# Patient Record
Sex: FEMALE | Race: WHITE | Employment: OTHER
[De-identification: names, ages, dates, MRNs, and addresses within clinical notes are randomized per-mention and may not be internally consistent; named-entity substitution may affect disease eponyms.]

---

## 2017-03-06 ENCOUNTER — OFFICE VISIT (OUTPATIENT)
Dept: FAMILY MEDICINE CLINIC | Facility: CLINIC | Age: 72
End: 2017-03-06

## 2017-03-06 VITALS
WEIGHT: 185 LBS | OXYGEN SATURATION: 97 % | SYSTOLIC BLOOD PRESSURE: 138 MMHG | BODY MASS INDEX: 33.84 KG/M2 | DIASTOLIC BLOOD PRESSURE: 82 MMHG | HEART RATE: 90 BPM

## 2017-03-06 DIAGNOSIS — Z23 NEED FOR VACCINATION WITH 13-POLYVALENT PNEUMOCOCCAL CONJUGATE VACCINE: ICD-10-CM

## 2017-03-06 DIAGNOSIS — E78.00 PURE HYPERCHOLESTEROLEMIA: ICD-10-CM

## 2017-03-06 DIAGNOSIS — I10 ESSENTIAL HYPERTENSION, BENIGN: Primary | ICD-10-CM

## 2017-03-06 PROCEDURE — G8484 FLU IMMUNIZE NO ADMIN: HCPCS | Performed by: FAMILY MEDICINE

## 2017-03-06 PROCEDURE — 99214 OFFICE O/P EST MOD 30 MIN: CPT | Performed by: FAMILY MEDICINE

## 2017-03-06 PROCEDURE — G8400 PT W/DXA NO RESULTS DOC: HCPCS | Performed by: FAMILY MEDICINE

## 2017-03-06 PROCEDURE — G8427 DOCREV CUR MEDS BY ELIG CLIN: HCPCS | Performed by: FAMILY MEDICINE

## 2017-03-06 PROCEDURE — 1123F ACP DISCUSS/DSCN MKR DOCD: CPT | Performed by: FAMILY MEDICINE

## 2017-03-06 PROCEDURE — 3014F SCREEN MAMMO DOC REV: CPT | Performed by: FAMILY MEDICINE

## 2017-03-06 PROCEDURE — 3017F COLORECTAL CA SCREEN DOC REV: CPT | Performed by: FAMILY MEDICINE

## 2017-03-06 PROCEDURE — 1090F PRES/ABSN URINE INCON ASSESS: CPT | Performed by: FAMILY MEDICINE

## 2017-03-06 PROCEDURE — G0009 ADMIN PNEUMOCOCCAL VACCINE: HCPCS | Performed by: FAMILY MEDICINE

## 2017-03-06 PROCEDURE — G8419 CALC BMI OUT NRM PARAM NOF/U: HCPCS | Performed by: FAMILY MEDICINE

## 2017-03-06 PROCEDURE — 1036F TOBACCO NON-USER: CPT | Performed by: FAMILY MEDICINE

## 2017-03-06 PROCEDURE — 90670 PCV13 VACCINE IM: CPT | Performed by: FAMILY MEDICINE

## 2017-03-06 PROCEDURE — 4040F PNEUMOC VAC/ADMIN/RCVD: CPT | Performed by: FAMILY MEDICINE

## 2017-03-06 RX ORDER — HYDROCHLOROTHIAZIDE 25 MG/1
25 TABLET ORAL DAILY
Qty: 30 TABLET | Refills: 5 | Status: SHIPPED | OUTPATIENT
Start: 2017-03-06 | End: 2017-09-05 | Stop reason: SDUPTHER

## 2017-03-06 RX ORDER — PRAVASTATIN SODIUM 20 MG
20 TABLET ORAL DAILY
Qty: 30 TABLET | Refills: 5 | Status: SHIPPED | OUTPATIENT
Start: 2017-03-06 | End: 2017-09-05 | Stop reason: SDUPTHER

## 2017-03-06 RX ORDER — AMLODIPINE BESYLATE 5 MG/1
5 TABLET ORAL DAILY
Qty: 30 TABLET | Refills: 5 | Status: SHIPPED | OUTPATIENT
Start: 2017-03-06 | End: 2017-09-05 | Stop reason: SDUPTHER

## 2017-03-06 ASSESSMENT — ENCOUNTER SYMPTOMS
ABDOMINAL PAIN: 0
COUGH: 0
BLOOD IN STOOL: 0
EYE DISCHARGE: 0
CONSTIPATION: 0
EYE REDNESS: 0
FACIAL SWELLING: 0
NAUSEA: 0
DIARRHEA: 0
BLURRED VISION: 0
SHORTNESS OF BREATH: 0
VOMITING: 0

## 2017-08-31 ENCOUNTER — HOSPITAL ENCOUNTER (OUTPATIENT)
Age: 72
Discharge: HOME | End: 2017-08-31
Payer: MEDICARE

## 2017-08-31 DIAGNOSIS — E78.00 PURE HYPERCHOLESTEROLEMIA: ICD-10-CM

## 2017-08-31 DIAGNOSIS — I10 ESSENTIAL HYPERTENSION, BENIGN: ICD-10-CM

## 2017-08-31 LAB
ALBUMIN SERPL-MCNC: 4.1 G/DL (ref 3.5–5.2)
ALBUMIN/GLOBULIN RATIO: ABNORMAL (ref 1–2.5)
ALP BLD-CCNC: 58 U/L (ref 35–104)
ALT SERPL-CCNC: 15 U/L (ref 5–33)
ANION GAP SERPL CALCULATED.3IONS-SCNC: 8 MMOL/L (ref 9–17)
AST SERPL-CCNC: 22 U/L
BILIRUB SERPL-MCNC: 1.7 MG/DL (ref 0.3–1.2)
BUN BLDV-MCNC: 13 MG/DL (ref 8–23)
BUN/CREAT BLD: 21 (ref 9–20)
CALCIUM SERPL-MCNC: 9 MG/DL (ref 8.6–10.4)
CHLORIDE BLD-SCNC: 104 MMOL/L (ref 98–107)
CHOLESTEROL/HDL RATIO: 2.7
CHOLESTEROL: 134 MG/DL
CO2: 32 MMOL/L (ref 20–31)
CREAT SERPL-MCNC: 0.62 MG/DL (ref 0.5–0.9)
GFR AFRICAN AMERICAN: >60 ML/MIN
GFR NON-AFRICAN AMERICAN: >60 ML/MIN
GFR SERPL CREATININE-BSD FRML MDRD: ABNORMAL ML/MIN/{1.73_M2}
GFR SERPL CREATININE-BSD FRML MDRD: ABNORMAL ML/MIN/{1.73_M2}
GLUCOSE BLD-MCNC: 105 MG/DL (ref 70–99)
HDLC SERPL-MCNC: 50 MG/DL
LDL CHOLESTEROL: 65 MG/DL (ref 0–130)
PATIENT FASTING?: YES
POTASSIUM SERPL-SCNC: 3.7 MMOL/L (ref 3.7–5.3)
SODIUM BLD-SCNC: 144 MMOL/L (ref 135–144)
TOTAL PROTEIN: 7.2 G/DL (ref 6.4–8.3)
TRIGL SERPL-MCNC: 94 MG/DL
VLDLC SERPL CALC-MCNC: NORMAL MG/DL (ref 1–30)

## 2017-08-31 PROCEDURE — 80053 COMPREHEN METABOLIC PANEL: CPT

## 2017-08-31 PROCEDURE — 80061 LIPID PANEL: CPT

## 2017-08-31 PROCEDURE — 36415 COLL VENOUS BLD VENIPUNCTURE: CPT

## 2017-09-05 ENCOUNTER — OFFICE VISIT (OUTPATIENT)
Dept: FAMILY MEDICINE CLINIC | Age: 72
End: 2017-09-05
Payer: MEDICARE

## 2017-09-05 VITALS
HEIGHT: 61 IN | WEIGHT: 185 LBS | BODY MASS INDEX: 34.93 KG/M2 | SYSTOLIC BLOOD PRESSURE: 136 MMHG | DIASTOLIC BLOOD PRESSURE: 88 MMHG

## 2017-09-05 DIAGNOSIS — I10 ESSENTIAL HYPERTENSION, BENIGN: ICD-10-CM

## 2017-09-05 DIAGNOSIS — E78.00 PURE HYPERCHOLESTEROLEMIA: ICD-10-CM

## 2017-09-05 PROCEDURE — G8419 CALC BMI OUT NRM PARAM NOF/U: HCPCS | Performed by: FAMILY MEDICINE

## 2017-09-05 PROCEDURE — 4040F PNEUMOC VAC/ADMIN/RCVD: CPT | Performed by: FAMILY MEDICINE

## 2017-09-05 PROCEDURE — 3014F SCREEN MAMMO DOC REV: CPT | Performed by: FAMILY MEDICINE

## 2017-09-05 PROCEDURE — G8400 PT W/DXA NO RESULTS DOC: HCPCS | Performed by: FAMILY MEDICINE

## 2017-09-05 PROCEDURE — 3017F COLORECTAL CA SCREEN DOC REV: CPT | Performed by: FAMILY MEDICINE

## 2017-09-05 PROCEDURE — 1036F TOBACCO NON-USER: CPT | Performed by: FAMILY MEDICINE

## 2017-09-05 PROCEDURE — 99213 OFFICE O/P EST LOW 20 MIN: CPT | Performed by: FAMILY MEDICINE

## 2017-09-05 PROCEDURE — 1090F PRES/ABSN URINE INCON ASSESS: CPT | Performed by: FAMILY MEDICINE

## 2017-09-05 PROCEDURE — G8427 DOCREV CUR MEDS BY ELIG CLIN: HCPCS | Performed by: FAMILY MEDICINE

## 2017-09-05 PROCEDURE — 1123F ACP DISCUSS/DSCN MKR DOCD: CPT | Performed by: FAMILY MEDICINE

## 2017-09-05 RX ORDER — AMLODIPINE BESYLATE 5 MG/1
5 TABLET ORAL DAILY
Qty: 30 TABLET | Refills: 5 | Status: SHIPPED | OUTPATIENT
Start: 2017-09-05 | End: 2018-03-06 | Stop reason: SDUPTHER

## 2017-09-05 RX ORDER — HYDROCHLOROTHIAZIDE 25 MG/1
25 TABLET ORAL DAILY
Qty: 30 TABLET | Refills: 5 | Status: SHIPPED | OUTPATIENT
Start: 2017-09-05 | End: 2018-03-06 | Stop reason: SDUPTHER

## 2017-09-05 RX ORDER — PRAVASTATIN SODIUM 20 MG
20 TABLET ORAL DAILY
Qty: 30 TABLET | Refills: 5 | Status: SHIPPED | OUTPATIENT
Start: 2017-09-05 | End: 2018-03-06 | Stop reason: SDUPTHER

## 2017-09-05 ASSESSMENT — ENCOUNTER SYMPTOMS
BLOOD IN STOOL: 0
COUGH: 0
EYE REDNESS: 0
EYE DISCHARGE: 0
CONSTIPATION: 0
SHORTNESS OF BREATH: 0
VOMITING: 0
DIARRHEA: 0
NAUSEA: 0
FACIAL SWELLING: 0
ABDOMINAL PAIN: 0

## 2018-02-28 ENCOUNTER — HOSPITAL ENCOUNTER (OUTPATIENT)
Age: 73
Discharge: HOME | End: 2018-02-28
Payer: MEDICARE

## 2018-02-28 DIAGNOSIS — I10 ESSENTIAL HYPERTENSION, BENIGN: ICD-10-CM

## 2018-02-28 DIAGNOSIS — E78.00 PURE HYPERCHOLESTEROLEMIA: ICD-10-CM

## 2018-02-28 LAB
ALBUMIN SERPL-MCNC: 4.2 G/DL (ref 3.5–5.2)
ALBUMIN/GLOBULIN RATIO: ABNORMAL (ref 1–2.5)
ALP BLD-CCNC: 59 U/L (ref 35–104)
ALT SERPL-CCNC: 14 U/L (ref 5–33)
ANION GAP SERPL CALCULATED.3IONS-SCNC: 10 MMOL/L (ref 9–17)
AST SERPL-CCNC: 25 U/L
BILIRUB SERPL-MCNC: 1.54 MG/DL (ref 0.3–1.2)
BUN BLDV-MCNC: 14 MG/DL (ref 8–23)
BUN/CREAT BLD: 22 (ref 9–20)
CALCIUM SERPL-MCNC: 9.6 MG/DL (ref 8.6–10.4)
CHLORIDE BLD-SCNC: 101 MMOL/L (ref 98–107)
CHOLESTEROL/HDL RATIO: 2.5
CHOLESTEROL: 128 MG/DL
CO2: 34 MMOL/L (ref 20–31)
CREAT SERPL-MCNC: 0.65 MG/DL (ref 0.5–0.9)
GFR AFRICAN AMERICAN: >60 ML/MIN
GFR NON-AFRICAN AMERICAN: >60 ML/MIN
GFR SERPL CREATININE-BSD FRML MDRD: ABNORMAL ML/MIN/{1.73_M2}
GFR SERPL CREATININE-BSD FRML MDRD: ABNORMAL ML/MIN/{1.73_M2}
GLUCOSE BLD-MCNC: 108 MG/DL (ref 70–99)
HDLC SERPL-MCNC: 51 MG/DL
LDL CHOLESTEROL: 60 MG/DL (ref 0–130)
PATIENT FASTING?: YES
POTASSIUM SERPL-SCNC: 3.9 MMOL/L (ref 3.7–5.3)
SODIUM BLD-SCNC: 145 MMOL/L (ref 135–144)
TOTAL PROTEIN: 7.3 G/DL (ref 6.4–8.3)
TRIGL SERPL-MCNC: 83 MG/DL
VLDLC SERPL CALC-MCNC: NORMAL MG/DL (ref 1–30)

## 2018-02-28 PROCEDURE — 36415 COLL VENOUS BLD VENIPUNCTURE: CPT

## 2018-02-28 PROCEDURE — 80061 LIPID PANEL: CPT

## 2018-02-28 PROCEDURE — 80053 COMPREHEN METABOLIC PANEL: CPT

## 2018-03-06 ENCOUNTER — OFFICE VISIT (OUTPATIENT)
Dept: FAMILY MEDICINE CLINIC | Age: 73
End: 2018-03-06
Payer: MEDICARE

## 2018-03-06 VITALS
SYSTOLIC BLOOD PRESSURE: 140 MMHG | WEIGHT: 185 LBS | BODY MASS INDEX: 34.93 KG/M2 | DIASTOLIC BLOOD PRESSURE: 90 MMHG | HEIGHT: 61 IN

## 2018-03-06 DIAGNOSIS — Z12.31 VISIT FOR SCREENING MAMMOGRAM: ICD-10-CM

## 2018-03-06 DIAGNOSIS — E78.00 PURE HYPERCHOLESTEROLEMIA: ICD-10-CM

## 2018-03-06 DIAGNOSIS — Z23 NEED FOR 23-POLYVALENT PNEUMOCOCCAL POLYSACCHARIDE VACCINE: ICD-10-CM

## 2018-03-06 DIAGNOSIS — I10 ESSENTIAL HYPERTENSION, BENIGN: Primary | ICD-10-CM

## 2018-03-06 PROCEDURE — 1123F ACP DISCUSS/DSCN MKR DOCD: CPT | Performed by: FAMILY MEDICINE

## 2018-03-06 PROCEDURE — G0009 ADMIN PNEUMOCOCCAL VACCINE: HCPCS | Performed by: FAMILY MEDICINE

## 2018-03-06 PROCEDURE — 3017F COLORECTAL CA SCREEN DOC REV: CPT | Performed by: FAMILY MEDICINE

## 2018-03-06 PROCEDURE — 99213 OFFICE O/P EST LOW 20 MIN: CPT | Performed by: FAMILY MEDICINE

## 2018-03-06 PROCEDURE — G8400 PT W/DXA NO RESULTS DOC: HCPCS | Performed by: FAMILY MEDICINE

## 2018-03-06 PROCEDURE — 1090F PRES/ABSN URINE INCON ASSESS: CPT | Performed by: FAMILY MEDICINE

## 2018-03-06 PROCEDURE — 3014F SCREEN MAMMO DOC REV: CPT | Performed by: FAMILY MEDICINE

## 2018-03-06 PROCEDURE — G8417 CALC BMI ABV UP PARAM F/U: HCPCS | Performed by: FAMILY MEDICINE

## 2018-03-06 PROCEDURE — G8427 DOCREV CUR MEDS BY ELIG CLIN: HCPCS | Performed by: FAMILY MEDICINE

## 2018-03-06 PROCEDURE — 90732 PPSV23 VACC 2 YRS+ SUBQ/IM: CPT | Performed by: FAMILY MEDICINE

## 2018-03-06 PROCEDURE — G8484 FLU IMMUNIZE NO ADMIN: HCPCS | Performed by: FAMILY MEDICINE

## 2018-03-06 PROCEDURE — 4040F PNEUMOC VAC/ADMIN/RCVD: CPT | Performed by: FAMILY MEDICINE

## 2018-03-06 PROCEDURE — 1036F TOBACCO NON-USER: CPT | Performed by: FAMILY MEDICINE

## 2018-03-06 RX ORDER — AMLODIPINE BESYLATE 5 MG/1
5 TABLET ORAL DAILY
Qty: 30 TABLET | Refills: 5 | Status: SHIPPED | OUTPATIENT
Start: 2018-03-06 | End: 2018-09-06 | Stop reason: SDUPTHER

## 2018-03-06 RX ORDER — HYDROCHLOROTHIAZIDE 25 MG/1
25 TABLET ORAL DAILY
Qty: 30 TABLET | Refills: 5 | Status: SHIPPED | OUTPATIENT
Start: 2018-03-06 | End: 2018-09-06 | Stop reason: SDUPTHER

## 2018-03-06 RX ORDER — PRAVASTATIN SODIUM 20 MG
20 TABLET ORAL DAILY
Qty: 30 TABLET | Refills: 5 | Status: SHIPPED | OUTPATIENT
Start: 2018-03-06 | End: 2018-09-06 | Stop reason: SDUPTHER

## 2018-03-06 ASSESSMENT — ENCOUNTER SYMPTOMS
EYE DISCHARGE: 0
FACIAL SWELLING: 0
NAUSEA: 0
EYE REDNESS: 0
CONSTIPATION: 0
VOMITING: 0
BLOOD IN STOOL: 0
COUGH: 0
DIARRHEA: 0
SHORTNESS OF BREATH: 0
ABDOMINAL PAIN: 0

## 2018-03-06 ASSESSMENT — PATIENT HEALTH QUESTIONNAIRE - PHQ9
SUM OF ALL RESPONSES TO PHQ9 QUESTIONS 1 & 2: 0
1. LITTLE INTEREST OR PLEASURE IN DOING THINGS: 0
2. FEELING DOWN, DEPRESSED OR HOPELESS: 0
SUM OF ALL RESPONSES TO PHQ QUESTIONS 1-9: 0

## 2018-03-06 NOTE — PROGRESS NOTES
[facility]    TUBAL LIGATION          Medications:       Prior to Admission medications    Medication Sig Start Date End Date Taking? Authorizing Provider   vitamin D (CHOLECALCIFEROL) 1000 UNIT TABS tablet Take 1,000 Units by mouth daily   Yes Historical Provider, MD   amLODIPine (NORVASC) 5 MG tablet Take 1 tablet by mouth daily 9/5/17  Yes Bryan Dickinson MD   pravastatin (PRAVACHOL) 20 MG tablet Take 1 tablet by mouth daily 9/5/17  Yes Bryan Dickinson MD   hydrochlorothiazide (HYDRODIURIL) 25 MG tablet Take 1 tablet by mouth daily 9/5/17  Yes Bryan Dickinson MD   Misc Natural Products (GLUCOSAMINE CHONDROITIN ADV) TABS Take 1 tablet by mouth 2 times daily. Glucosamine 1500mg / Chondroitin 1200mg   Yes Historical Provider, MD   Calcium-Vitamin D (CALTRATE 600 PLUS-VIT D PO) Take 2 tablets by mouth daily. Yes Historical Provider, MD   ascorbic acid (VITAMIN C) 500 MG tablet Take 500 mg by mouth daily. Yes Historical Provider, MD   vitamin E 400 UNIT capsule Take 400 Units by mouth daily. Yes Historical Provider, MD        Allergies:       Patient has no known allergies. Social History:     Tobacco:    reports that she has never smoked. She has never used smokeless tobacco.  Alcohol:      reports that she does not drink alcohol. Drug Use:  reports that she does not use drugs. Family History:     Family History   Problem Relation Age of Onset    High Blood Pressure Mother     Heart Disease Father 61    High Blood Pressure Sister     High Cholesterol Sister     Cancer Brother      colon    High Blood Pressure Brother     High Cholesterol Brother        Review of Systems:       Review of Systems   Constitutional: Negative for chills, fatigue, fever and unexpected weight change. HENT: Negative for congestion and facial swelling. Eyes: Negative for discharge, redness and visual disturbance. Respiratory: Negative for cough and shortness of breath.     Cardiovascular: Negative for chest pain, palpitations and leg swelling. Gastrointestinal: Negative for abdominal pain, blood in stool, constipation, diarrhea, nausea and vomiting. Genitourinary: Negative for dysuria and hematuria. Musculoskeletal: Negative for joint swelling and neck pain. Skin: Negative for rash. Neurological: Negative for dizziness, light-headedness and headaches. Psychiatric/Behavioral: Negative for confusion and sleep disturbance. Physical Exam:     Physical Exam   Constitutional: She is oriented to person, place, and time. She appears well-developed and well-nourished. HENT:   Head: Normocephalic and atraumatic. Eyes: Conjunctivae are normal. Pupils are equal, round, and reactive to light. Right eye exhibits no discharge. Left eye exhibits no discharge. Neck: Neck supple. No thyromegaly present. Cardiovascular: Normal rate, regular rhythm and normal heart sounds. No murmur heard. Pulmonary/Chest: Effort normal and breath sounds normal. No respiratory distress. She has no wheezes. Abdominal: Soft. Bowel sounds are normal. She exhibits no distension. There is no tenderness. Musculoskeletal: She exhibits no edema. Lymphadenopathy:     She has no cervical adenopathy. Neurological: She is alert and oriented to person, place, and time. Skin: Skin is warm and dry. No rash noted. No erythema. Psychiatric: She has a normal mood and affect. Her behavior is normal.   Vitals reviewed.       Vitals:  BP (!) 140/90 (Site: Right Arm)   Ht 5' 1\" (1.549 m)   Wt 185 lb (83.9 kg)   BMI 34.96 kg/m²       Data:     Lab Results   Component Value Date     02/28/2018    K 3.9 02/28/2018     02/28/2018    CO2 34 02/28/2018    BUN 14 02/28/2018    CREATININE 0.65 02/28/2018    GLUCOSE 108 02/28/2018    GLUCOSE 102 03/02/2012    PROT 7.3 02/28/2018    LABALBU 4.2 02/28/2018    LABALBU 4.6 03/02/2012    BILITOT 1.54 02/28/2018    ALKPHOS 59 02/28/2018    AST 25 02/28/2018    ALT 14 02/28/2018     No results found for: WBC, RBC, HGB, HCT, MCV, MCH, MCHC, RDW, PLT, MPV  No results found for: TSH  Lab Results   Component Value Date    CHOL 128 02/28/2018    HDL 51 02/28/2018          Assessment/Plan:       1. Essential hypertension, benign  Stable on the norvasc and HCTZ  - amLODIPine (NORVASC) 5 MG tablet; Take 1 tablet by mouth daily  Dispense: 30 tablet; Refill: 5  - hydrochlorothiazide (HYDRODIURIL) 25 MG tablet; Take 1 tablet by mouth daily  Dispense: 30 tablet; Refill: 5  - Comprehensive Metabolic Panel; Future  - Lipid Panel; Future    2. Pure hypercholesterolemia  Stable on the pravachol  - pravastatin (PRAVACHOL) 20 MG tablet; Take 1 tablet by mouth daily  Dispense: 30 tablet; Refill: 5  - Comprehensive Metabolic Panel; Future  - Lipid Panel; Future    3. Visit for screening mammogram  Order for mammogram  - [facility] DIGITAL SCREEN W CAD BILATERAL; Future    4. Need for 23-polyvalent pneumococcal polysaccharide vaccine  Shot given  - Pneumococcal polysaccharide vaccine 23-valent greater than or equal to 3yo subcutaneous/IM    Eva received counseling on the following healthy behaviors: nutrition and exercise  Reviewed prior labs and health maintenance  Continue current medications, diet and exercise. Discussed use, benefit, and side effects of prescribed medications. Barriers to medication compliance addressed. Patient given educational materials - see patient instructions  Was a self-tracking handout given in paper form or via [employer]? Yes    Requested Prescriptions     Signed Prescriptions Disp Refills    amLODIPine (NORVASC) 5 MG tablet 30 tablet 5     Sig: Take 1 tablet by mouth daily    pravastatin (PRAVACHOL) 20 MG tablet 30 tablet 5     Sig: Take 1 tablet by mouth daily    hydrochlorothiazide (HYDRODIURIL) 25 MG tablet 30 tablet 5     Sig: Take 1 tablet by mouth daily       All patient questions answered. Patient voiced understanding. Quality Measures    Body mass index is 34.96 kg/m².

## 2018-04-03 ENCOUNTER — HOSPITAL ENCOUNTER (OUTPATIENT)
Dept: MAMMOGRAPHY | Age: 73
Discharge: HOME | End: 2018-04-05
Payer: MEDICARE

## 2018-04-03 DIAGNOSIS — Z12.31 VISIT FOR SCREENING MAMMOGRAM: ICD-10-CM

## 2018-04-03 PROCEDURE — 77067 SCR MAMMO BI INCL CAD: CPT

## 2018-08-28 ENCOUNTER — HOSPITAL ENCOUNTER (OUTPATIENT)
Age: 73
Discharge: HOME | End: 2018-08-28
Payer: MEDICARE

## 2018-08-28 DIAGNOSIS — I10 ESSENTIAL HYPERTENSION, BENIGN: ICD-10-CM

## 2018-08-28 DIAGNOSIS — E78.00 PURE HYPERCHOLESTEROLEMIA: ICD-10-CM

## 2018-08-28 LAB
ALBUMIN SERPL-MCNC: 4.2 G/DL (ref 3.5–5.2)
ALBUMIN/GLOBULIN RATIO: ABNORMAL (ref 1–2.5)
ALP BLD-CCNC: 63 U/L (ref 35–104)
ALT SERPL-CCNC: 15 U/L (ref 5–33)
ANION GAP SERPL CALCULATED.3IONS-SCNC: 11 MMOL/L (ref 9–17)
AST SERPL-CCNC: 23 U/L
BILIRUB SERPL-MCNC: 1.85 MG/DL (ref 0.3–1.2)
BUN BLDV-MCNC: 15 MG/DL (ref 8–23)
BUN/CREAT BLD: 23 (ref 9–20)
CALCIUM SERPL-MCNC: 9.7 MG/DL (ref 8.6–10.4)
CHLORIDE BLD-SCNC: 101 MMOL/L (ref 98–107)
CHOLESTEROL/HDL RATIO: 2.8
CHOLESTEROL: 136 MG/DL
CO2: 32 MMOL/L (ref 20–31)
CREAT SERPL-MCNC: 0.66 MG/DL (ref 0.5–0.9)
GFR AFRICAN AMERICAN: >60 ML/MIN
GFR NON-AFRICAN AMERICAN: >60 ML/MIN
GFR SERPL CREATININE-BSD FRML MDRD: ABNORMAL ML/MIN/{1.73_M2}
GFR SERPL CREATININE-BSD FRML MDRD: ABNORMAL ML/MIN/{1.73_M2}
GLUCOSE BLD-MCNC: 109 MG/DL (ref 70–99)
HDLC SERPL-MCNC: 49 MG/DL
LDL CHOLESTEROL: 66 MG/DL (ref 0–130)
PATIENT FASTING?: YES
POTASSIUM SERPL-SCNC: 3.5 MMOL/L (ref 3.7–5.3)
SODIUM BLD-SCNC: 144 MMOL/L (ref 135–144)
TOTAL PROTEIN: 7.1 G/DL (ref 6.4–8.3)
TRIGL SERPL-MCNC: 106 MG/DL
VLDLC SERPL CALC-MCNC: NORMAL MG/DL (ref 1–30)

## 2018-08-28 PROCEDURE — 36415 COLL VENOUS BLD VENIPUNCTURE: CPT

## 2018-08-28 PROCEDURE — 80061 LIPID PANEL: CPT

## 2018-08-28 PROCEDURE — 80053 COMPREHEN METABOLIC PANEL: CPT

## 2018-09-06 ENCOUNTER — OFFICE VISIT (OUTPATIENT)
Dept: FAMILY MEDICINE CLINIC | Age: 73
End: 2018-09-06
Payer: MEDICARE

## 2018-09-06 VITALS
SYSTOLIC BLOOD PRESSURE: 138 MMHG | OXYGEN SATURATION: 92 % | HEART RATE: 96 BPM | WEIGHT: 176 LBS | DIASTOLIC BLOOD PRESSURE: 90 MMHG | BODY MASS INDEX: 33.25 KG/M2

## 2018-09-06 DIAGNOSIS — E78.00 PURE HYPERCHOLESTEROLEMIA: ICD-10-CM

## 2018-09-06 DIAGNOSIS — I10 ESSENTIAL HYPERTENSION, BENIGN: ICD-10-CM

## 2018-09-06 PROCEDURE — 1101F PT FALLS ASSESS-DOCD LE1/YR: CPT | Performed by: FAMILY MEDICINE

## 2018-09-06 PROCEDURE — 3017F COLORECTAL CA SCREEN DOC REV: CPT | Performed by: FAMILY MEDICINE

## 2018-09-06 PROCEDURE — 1036F TOBACCO NON-USER: CPT | Performed by: FAMILY MEDICINE

## 2018-09-06 PROCEDURE — G8427 DOCREV CUR MEDS BY ELIG CLIN: HCPCS | Performed by: FAMILY MEDICINE

## 2018-09-06 PROCEDURE — G8400 PT W/DXA NO RESULTS DOC: HCPCS | Performed by: FAMILY MEDICINE

## 2018-09-06 PROCEDURE — 99213 OFFICE O/P EST LOW 20 MIN: CPT | Performed by: FAMILY MEDICINE

## 2018-09-06 PROCEDURE — 4040F PNEUMOC VAC/ADMIN/RCVD: CPT | Performed by: FAMILY MEDICINE

## 2018-09-06 PROCEDURE — G8417 CALC BMI ABV UP PARAM F/U: HCPCS | Performed by: FAMILY MEDICINE

## 2018-09-06 PROCEDURE — 1090F PRES/ABSN URINE INCON ASSESS: CPT | Performed by: FAMILY MEDICINE

## 2018-09-06 PROCEDURE — 1123F ACP DISCUSS/DSCN MKR DOCD: CPT | Performed by: FAMILY MEDICINE

## 2018-09-06 RX ORDER — AMLODIPINE BESYLATE 5 MG/1
5 TABLET ORAL DAILY
Qty: 30 TABLET | Refills: 5 | Status: SHIPPED | OUTPATIENT
Start: 2018-09-06 | End: 2019-03-05 | Stop reason: SDUPTHER

## 2018-09-06 RX ORDER — PRAVASTATIN SODIUM 20 MG
20 TABLET ORAL DAILY
Qty: 30 TABLET | Refills: 5 | Status: SHIPPED | OUTPATIENT
Start: 2018-09-06 | End: 2019-03-05 | Stop reason: SDUPTHER

## 2018-09-06 RX ORDER — HYDROCHLOROTHIAZIDE 25 MG/1
25 TABLET ORAL DAILY
Qty: 30 TABLET | Refills: 5 | Status: SHIPPED | OUTPATIENT
Start: 2018-09-06 | End: 2019-03-05 | Stop reason: SDUPTHER

## 2018-09-06 ASSESSMENT — ENCOUNTER SYMPTOMS
VOMITING: 0
NAUSEA: 0
CONSTIPATION: 0
FACIAL SWELLING: 0
SHORTNESS OF BREATH: 0
DIARRHEA: 0
BLURRED VISION: 0
BLOOD IN STOOL: 0
EYE REDNESS: 0
COUGH: 0
EYE DISCHARGE: 0
ABDOMINAL PAIN: 0

## 2018-09-06 NOTE — PATIENT INSTRUCTIONS
SURVEY:    You may be receiving a survey from [email or web address] regarding your visit today. Please complete the survey to enable us to provide the highest quality of care to you and your family. If you cannot score us a very good on any question, please call the office to discuss how we could have made your experience a very good one. Thank you.

## 2018-09-06 NOTE — PROGRESS NOTES
nausea and vomiting. Genitourinary: Negative for dysuria and hematuria. Musculoskeletal: Negative for joint swelling and neck stiffness. Skin: Negative for pallor and rash. Neurological: Negative for dizziness, light-headedness and headaches. Psychiatric/Behavioral: Negative for confusion and sleep disturbance. Physical Exam:     Physical Exam   Constitutional: She is oriented to person, place, and time. She appears well-developed and well-nourished. HENT:   Head: Normocephalic and atraumatic. Eyes: Pupils are equal, round, and reactive to light. Conjunctivae are normal. Right eye exhibits no discharge. Left eye exhibits no discharge. Neck: Neck supple. No thyromegaly present. Cardiovascular: Normal rate, regular rhythm and normal heart sounds. No murmur heard. Pulmonary/Chest: Effort normal and breath sounds normal. No respiratory distress. Abdominal: Soft. Bowel sounds are normal. She exhibits no distension. There is no tenderness. Musculoskeletal: She exhibits no edema. Lymphadenopathy:     She has no cervical adenopathy. Neurological: She is alert and oriented to person, place, and time. Skin: Skin is warm and dry. No rash noted. No erythema. Psychiatric: She has a normal mood and affect. Her behavior is normal.   Vitals reviewed.       Vitals:  BP (!) 138/90   Pulse 96   Wt 176 lb (79.8 kg)   SpO2 92%   BMI 33.25 kg/m²       Data:     Lab Results   Component Value Date     08/28/2018    K 3.5 08/28/2018     08/28/2018    CO2 32 08/28/2018    BUN 15 08/28/2018    CREATININE 0.66 08/28/2018    GLUCOSE 109 08/28/2018    GLUCOSE 102 03/02/2012    PROT 7.1 08/28/2018    LABALBU 4.2 08/28/2018    LABALBU 4.6 03/02/2012    BILITOT 1.85 08/28/2018    ALKPHOS 63 08/28/2018    AST 23 08/28/2018    ALT 15 08/28/2018     No results found for: WBC, RBC, HGB, HCT, MCV, MCH, MCHC, RDW, PLT, MPV  No results found for: TSH  Lab Results   Component Value Date    CHOL 136 LDLCHOLESTEROL 66 08/28/2018    (goal LDL reduction with dx if diabetes is 50% LDL reduction)    PHQ Scores 3/6/2018 9/6/2016 9/1/2015 9/3/2014   PHQ2 Score 0 0 0 0   PHQ9 Score 0 0 0 0     Interpretation of Total Score Depression Severity: 1-4 = Minimal depression, 5-9 = Mild depression, 10-14 = Moderate depression, 15-19 = Moderately severe depression, 20-27 = Severe depression        Return in about 6 months (around 3/6/2019) for Hyperlipidemia, HTN.       Electronically signed by Sharee Prajapati MD on 9/6/2018 at 9:41 AM

## 2019-01-08 ENCOUNTER — HOSPITAL ENCOUNTER (OUTPATIENT)
Dept: GENERAL RADIOLOGY | Age: 74
Discharge: HOME | End: 2019-01-10
Payer: MEDICARE

## 2019-01-08 ENCOUNTER — HOSPITAL ENCOUNTER (OUTPATIENT)
Age: 74
End: 2019-01-08
Payer: MEDICARE

## 2019-01-08 ENCOUNTER — OFFICE VISIT (OUTPATIENT)
Dept: FAMILY MEDICINE CLINIC | Age: 74
End: 2019-01-08
Payer: MEDICARE

## 2019-01-08 VITALS — DIASTOLIC BLOOD PRESSURE: 92 MMHG | BODY MASS INDEX: 33.44 KG/M2 | SYSTOLIC BLOOD PRESSURE: 144 MMHG | WEIGHT: 177 LBS

## 2019-01-08 DIAGNOSIS — R07.81 RIB PAIN ON LEFT SIDE: ICD-10-CM

## 2019-01-08 DIAGNOSIS — R07.81 RIB PAIN ON LEFT SIDE: Primary | ICD-10-CM

## 2019-01-08 PROCEDURE — 1090F PRES/ABSN URINE INCON ASSESS: CPT | Performed by: FAMILY MEDICINE

## 2019-01-08 PROCEDURE — G8427 DOCREV CUR MEDS BY ELIG CLIN: HCPCS | Performed by: FAMILY MEDICINE

## 2019-01-08 PROCEDURE — G8484 FLU IMMUNIZE NO ADMIN: HCPCS | Performed by: FAMILY MEDICINE

## 2019-01-08 PROCEDURE — 4040F PNEUMOC VAC/ADMIN/RCVD: CPT | Performed by: FAMILY MEDICINE

## 2019-01-08 PROCEDURE — G8417 CALC BMI ABV UP PARAM F/U: HCPCS | Performed by: FAMILY MEDICINE

## 2019-01-08 PROCEDURE — 3017F COLORECTAL CA SCREEN DOC REV: CPT | Performed by: FAMILY MEDICINE

## 2019-01-08 PROCEDURE — G8400 PT W/DXA NO RESULTS DOC: HCPCS | Performed by: FAMILY MEDICINE

## 2019-01-08 PROCEDURE — 1036F TOBACCO NON-USER: CPT | Performed by: FAMILY MEDICINE

## 2019-01-08 PROCEDURE — 71100 X-RAY EXAM RIBS UNI 2 VIEWS: CPT

## 2019-01-08 PROCEDURE — 1123F ACP DISCUSS/DSCN MKR DOCD: CPT | Performed by: FAMILY MEDICINE

## 2019-01-08 PROCEDURE — 1101F PT FALLS ASSESS-DOCD LE1/YR: CPT | Performed by: FAMILY MEDICINE

## 2019-01-08 PROCEDURE — 99213 OFFICE O/P EST LOW 20 MIN: CPT | Performed by: FAMILY MEDICINE

## 2019-01-08 ASSESSMENT — ENCOUNTER SYMPTOMS
WHEEZING: 0
SHORTNESS OF BREATH: 0
EYE DISCHARGE: 0
VOMITING: 0
COUGH: 0
EYE REDNESS: 0

## 2019-02-26 ENCOUNTER — HOSPITAL ENCOUNTER (OUTPATIENT)
Age: 74
Discharge: HOME | End: 2019-02-26
Payer: MEDICARE

## 2019-02-26 DIAGNOSIS — E78.00 PURE HYPERCHOLESTEROLEMIA: ICD-10-CM

## 2019-02-26 DIAGNOSIS — I10 ESSENTIAL HYPERTENSION, BENIGN: ICD-10-CM

## 2019-02-26 LAB
ALBUMIN SERPL-MCNC: 4.5 G/DL (ref 3.5–5.2)
ALBUMIN/GLOBULIN RATIO: ABNORMAL (ref 1–2.5)
ALP BLD-CCNC: 65 U/L (ref 35–104)
ALT SERPL-CCNC: 16 U/L (ref 5–33)
ANION GAP SERPL CALCULATED.3IONS-SCNC: 9 MMOL/L (ref 9–17)
AST SERPL-CCNC: 21 U/L
BILIRUB SERPL-MCNC: 1.7 MG/DL (ref 0.3–1.2)
BUN BLDV-MCNC: 13 MG/DL (ref 8–23)
BUN/CREAT BLD: 19 (ref 9–20)
CALCIUM SERPL-MCNC: 9.4 MG/DL (ref 8.6–10.4)
CHLORIDE BLD-SCNC: 104 MMOL/L (ref 98–107)
CHOLESTEROL/HDL RATIO: 2.6
CHOLESTEROL: 137 MG/DL
CO2: 30 MMOL/L (ref 20–31)
CREAT SERPL-MCNC: 0.67 MG/DL (ref 0.5–0.9)
GFR AFRICAN AMERICAN: >60 ML/MIN
GFR NON-AFRICAN AMERICAN: >60 ML/MIN
GFR SERPL CREATININE-BSD FRML MDRD: ABNORMAL ML/MIN/{1.73_M2}
GFR SERPL CREATININE-BSD FRML MDRD: ABNORMAL ML/MIN/{1.73_M2}
GLUCOSE BLD-MCNC: 109 MG/DL (ref 70–99)
HDLC SERPL-MCNC: 53 MG/DL
LDL CHOLESTEROL: 64 MG/DL (ref 0–130)
PATIENT FASTING?: YES
POTASSIUM SERPL-SCNC: 4 MMOL/L (ref 3.7–5.3)
SODIUM BLD-SCNC: 143 MMOL/L (ref 135–144)
TOTAL PROTEIN: 7.5 G/DL (ref 6.4–8.3)
TRIGL SERPL-MCNC: 102 MG/DL
VLDLC SERPL CALC-MCNC: NORMAL MG/DL (ref 1–30)

## 2019-02-26 PROCEDURE — 36415 COLL VENOUS BLD VENIPUNCTURE: CPT

## 2019-02-26 PROCEDURE — 80061 LIPID PANEL: CPT

## 2019-02-26 PROCEDURE — 80053 COMPREHEN METABOLIC PANEL: CPT

## 2019-03-05 ENCOUNTER — OFFICE VISIT (OUTPATIENT)
Dept: FAMILY MEDICINE CLINIC | Age: 74
End: 2019-03-05
Payer: MEDICARE

## 2019-03-05 VITALS
OXYGEN SATURATION: 97 % | HEIGHT: 61 IN | SYSTOLIC BLOOD PRESSURE: 130 MMHG | BODY MASS INDEX: 33.42 KG/M2 | WEIGHT: 177 LBS | DIASTOLIC BLOOD PRESSURE: 84 MMHG | HEART RATE: 106 BPM

## 2019-03-05 DIAGNOSIS — R73.9 HYPERGLYCEMIA: ICD-10-CM

## 2019-03-05 DIAGNOSIS — I10 ESSENTIAL HYPERTENSION, BENIGN: ICD-10-CM

## 2019-03-05 DIAGNOSIS — E78.00 PURE HYPERCHOLESTEROLEMIA: ICD-10-CM

## 2019-03-05 DIAGNOSIS — R42 VERTIGO: Primary | ICD-10-CM

## 2019-03-05 PROCEDURE — 99214 OFFICE O/P EST MOD 30 MIN: CPT | Performed by: FAMILY MEDICINE

## 2019-03-05 PROCEDURE — 4040F PNEUMOC VAC/ADMIN/RCVD: CPT | Performed by: FAMILY MEDICINE

## 2019-03-05 PROCEDURE — G8427 DOCREV CUR MEDS BY ELIG CLIN: HCPCS | Performed by: FAMILY MEDICINE

## 2019-03-05 PROCEDURE — 3017F COLORECTAL CA SCREEN DOC REV: CPT | Performed by: FAMILY MEDICINE

## 2019-03-05 PROCEDURE — 1036F TOBACCO NON-USER: CPT | Performed by: FAMILY MEDICINE

## 2019-03-05 PROCEDURE — 1090F PRES/ABSN URINE INCON ASSESS: CPT | Performed by: FAMILY MEDICINE

## 2019-03-05 PROCEDURE — G8484 FLU IMMUNIZE NO ADMIN: HCPCS | Performed by: FAMILY MEDICINE

## 2019-03-05 PROCEDURE — G8417 CALC BMI ABV UP PARAM F/U: HCPCS | Performed by: FAMILY MEDICINE

## 2019-03-05 PROCEDURE — G8400 PT W/DXA NO RESULTS DOC: HCPCS | Performed by: FAMILY MEDICINE

## 2019-03-05 PROCEDURE — 1101F PT FALLS ASSESS-DOCD LE1/YR: CPT | Performed by: FAMILY MEDICINE

## 2019-03-05 PROCEDURE — 1123F ACP DISCUSS/DSCN MKR DOCD: CPT | Performed by: FAMILY MEDICINE

## 2019-03-05 RX ORDER — PRAVASTATIN SODIUM 20 MG
20 TABLET ORAL DAILY
Qty: 30 TABLET | Refills: 5 | Status: SHIPPED | OUTPATIENT
Start: 2019-03-05 | End: 2019-09-03 | Stop reason: SDUPTHER

## 2019-03-05 RX ORDER — AMLODIPINE BESYLATE 5 MG/1
5 TABLET ORAL DAILY
Qty: 30 TABLET | Refills: 5 | Status: SHIPPED | OUTPATIENT
Start: 2019-03-05 | End: 2019-09-03 | Stop reason: SDUPTHER

## 2019-03-05 RX ORDER — MECLIZINE HYDROCHLORIDE 25 MG/1
25 TABLET ORAL 3 TIMES DAILY PRN
Qty: 30 TABLET | Refills: 1 | Status: SHIPPED | OUTPATIENT
Start: 2019-03-05 | End: 2021-06-24

## 2019-03-05 RX ORDER — HYDROCHLOROTHIAZIDE 25 MG/1
25 TABLET ORAL DAILY
Qty: 30 TABLET | Refills: 5 | Status: SHIPPED | OUTPATIENT
Start: 2019-03-05 | End: 2019-09-03 | Stop reason: SDUPTHER

## 2019-03-05 ASSESSMENT — ENCOUNTER SYMPTOMS
SHORTNESS OF BREATH: 0
FACIAL SWELLING: 0
BLURRED VISION: 0
EYE REDNESS: 0
ABDOMINAL PAIN: 0
VOMITING: 0
NAUSEA: 0
CONSTIPATION: 0
EYE DISCHARGE: 0
COUGH: 0
DIARRHEA: 0
BLOOD IN STOOL: 0

## 2019-03-05 ASSESSMENT — PATIENT HEALTH QUESTIONNAIRE - PHQ9
SUM OF ALL RESPONSES TO PHQ9 QUESTIONS 1 & 2: 0
SUM OF ALL RESPONSES TO PHQ QUESTIONS 1-9: 0
2. FEELING DOWN, DEPRESSED OR HOPELESS: 0
SUM OF ALL RESPONSES TO PHQ QUESTIONS 1-9: 0
1. LITTLE INTEREST OR PLEASURE IN DOING THINGS: 0

## 2019-08-27 ENCOUNTER — HOSPITAL ENCOUNTER (OUTPATIENT)
Age: 74
Discharge: HOME | End: 2019-08-27
Payer: MEDICARE

## 2019-08-27 DIAGNOSIS — R73.9 HYPERGLYCEMIA: ICD-10-CM

## 2019-08-27 DIAGNOSIS — E78.00 PURE HYPERCHOLESTEROLEMIA: ICD-10-CM

## 2019-08-27 DIAGNOSIS — I10 ESSENTIAL HYPERTENSION, BENIGN: ICD-10-CM

## 2019-08-27 LAB
ALBUMIN SERPL-MCNC: 4.2 G/DL (ref 3.5–5.2)
ALBUMIN/GLOBULIN RATIO: ABNORMAL (ref 1–2.5)
ALP BLD-CCNC: 68 U/L (ref 35–104)
ALT SERPL-CCNC: 14 U/L (ref 5–33)
ANION GAP SERPL CALCULATED.3IONS-SCNC: 10 MMOL/L (ref 9–17)
AST SERPL-CCNC: 20 U/L
BILIRUB SERPL-MCNC: 1.58 MG/DL (ref 0.3–1.2)
BUN BLDV-MCNC: 15 MG/DL (ref 8–23)
BUN/CREAT BLD: 23 (ref 9–20)
CALCIUM SERPL-MCNC: 10.1 MG/DL (ref 8.6–10.4)
CHLORIDE BLD-SCNC: 100 MMOL/L (ref 98–107)
CHOLESTEROL/HDL RATIO: 2.8
CHOLESTEROL: 146 MG/DL
CO2: 30 MMOL/L (ref 20–31)
CREAT SERPL-MCNC: 0.65 MG/DL (ref 0.5–0.9)
ESTIMATED AVERAGE GLUCOSE: 111 MG/DL
GFR AFRICAN AMERICAN: >60 ML/MIN
GFR NON-AFRICAN AMERICAN: >60 ML/MIN
GFR SERPL CREATININE-BSD FRML MDRD: ABNORMAL ML/MIN/{1.73_M2}
GFR SERPL CREATININE-BSD FRML MDRD: ABNORMAL ML/MIN/{1.73_M2}
GLUCOSE BLD-MCNC: 115 MG/DL (ref 70–99)
HBA1C MFR BLD: 5.5 % (ref 4.8–5.9)
HDLC SERPL-MCNC: 53 MG/DL
LDL CHOLESTEROL: 71 MG/DL (ref 0–130)
PATIENT FASTING?: YES
POTASSIUM SERPL-SCNC: 4 MMOL/L (ref 3.7–5.3)
SODIUM BLD-SCNC: 140 MMOL/L (ref 135–144)
TOTAL PROTEIN: 8 G/DL (ref 6.4–8.3)
TRIGL SERPL-MCNC: 111 MG/DL
VLDLC SERPL CALC-MCNC: NORMAL MG/DL (ref 1–30)

## 2019-08-27 PROCEDURE — 80061 LIPID PANEL: CPT

## 2019-08-27 PROCEDURE — 83036 HEMOGLOBIN GLYCOSYLATED A1C: CPT

## 2019-08-27 PROCEDURE — 80053 COMPREHEN METABOLIC PANEL: CPT

## 2019-08-27 PROCEDURE — 36415 COLL VENOUS BLD VENIPUNCTURE: CPT

## 2019-09-03 ENCOUNTER — OFFICE VISIT (OUTPATIENT)
Dept: FAMILY MEDICINE CLINIC | Age: 74
End: 2019-09-03
Payer: MEDICARE

## 2019-09-03 VITALS
HEART RATE: 80 BPM | OXYGEN SATURATION: 96 % | DIASTOLIC BLOOD PRESSURE: 82 MMHG | WEIGHT: 170 LBS | SYSTOLIC BLOOD PRESSURE: 132 MMHG | BODY MASS INDEX: 32.12 KG/M2

## 2019-09-03 DIAGNOSIS — R73.9 HYPERGLYCEMIA: ICD-10-CM

## 2019-09-03 DIAGNOSIS — I10 ESSENTIAL HYPERTENSION, BENIGN: Primary | ICD-10-CM

## 2019-09-03 DIAGNOSIS — E78.00 PURE HYPERCHOLESTEROLEMIA: ICD-10-CM

## 2019-09-03 PROCEDURE — 99213 OFFICE O/P EST LOW 20 MIN: CPT | Performed by: FAMILY MEDICINE

## 2019-09-03 PROCEDURE — G8510 SCR DEP NEG, NO PLAN REQD: HCPCS | Performed by: FAMILY MEDICINE

## 2019-09-03 PROCEDURE — 1036F TOBACCO NON-USER: CPT | Performed by: FAMILY MEDICINE

## 2019-09-03 PROCEDURE — 1123F ACP DISCUSS/DSCN MKR DOCD: CPT | Performed by: FAMILY MEDICINE

## 2019-09-03 PROCEDURE — 3017F COLORECTAL CA SCREEN DOC REV: CPT | Performed by: FAMILY MEDICINE

## 2019-09-03 PROCEDURE — 1090F PRES/ABSN URINE INCON ASSESS: CPT | Performed by: FAMILY MEDICINE

## 2019-09-03 PROCEDURE — G8427 DOCREV CUR MEDS BY ELIG CLIN: HCPCS | Performed by: FAMILY MEDICINE

## 2019-09-03 PROCEDURE — G8417 CALC BMI ABV UP PARAM F/U: HCPCS | Performed by: FAMILY MEDICINE

## 2019-09-03 PROCEDURE — 4040F PNEUMOC VAC/ADMIN/RCVD: CPT | Performed by: FAMILY MEDICINE

## 2019-09-03 PROCEDURE — G8400 PT W/DXA NO RESULTS DOC: HCPCS | Performed by: FAMILY MEDICINE

## 2019-09-03 PROCEDURE — 3288F FALL RISK ASSESSMENT DOCD: CPT | Performed by: FAMILY MEDICINE

## 2019-09-03 RX ORDER — PRAVASTATIN SODIUM 20 MG
20 TABLET ORAL DAILY
Qty: 30 TABLET | Refills: 5 | Status: SHIPPED | OUTPATIENT
Start: 2019-09-03 | End: 2020-03-03 | Stop reason: SDUPTHER

## 2019-09-03 RX ORDER — HYDROCHLOROTHIAZIDE 25 MG/1
25 TABLET ORAL DAILY
Qty: 30 TABLET | Refills: 5 | Status: SHIPPED | OUTPATIENT
Start: 2019-09-03 | End: 2020-03-03 | Stop reason: SDUPTHER

## 2019-09-03 RX ORDER — AMLODIPINE BESYLATE 5 MG/1
5 TABLET ORAL DAILY
Qty: 30 TABLET | Refills: 5 | Status: SHIPPED | OUTPATIENT
Start: 2019-09-03 | End: 2020-03-03 | Stop reason: SDUPTHER

## 2019-09-03 ASSESSMENT — ENCOUNTER SYMPTOMS
SHORTNESS OF BREATH: 0
DIARRHEA: 0
BLURRED VISION: 0
BLOOD IN STOOL: 0
ABDOMINAL PAIN: 0
VOMITING: 0
EYE REDNESS: 0
COUGH: 0
EYE DISCHARGE: 0
CONSTIPATION: 0
NAUSEA: 0
FACIAL SWELLING: 0

## 2019-09-03 ASSESSMENT — PATIENT HEALTH QUESTIONNAIRE - PHQ9
SUM OF ALL RESPONSES TO PHQ9 QUESTIONS 1 & 2: 0
2. FEELING DOWN, DEPRESSED OR HOPELESS: 0
SUM OF ALL RESPONSES TO PHQ QUESTIONS 1-9: 0
SUM OF ALL RESPONSES TO PHQ QUESTIONS 1-9: 0
1. LITTLE INTEREST OR PLEASURE IN DOING THINGS: 0

## 2019-09-12 ENCOUNTER — TELEPHONE (OUTPATIENT)
Dept: FAMILY MEDICINE CLINIC | Age: 74
End: 2019-09-12

## 2020-02-25 ENCOUNTER — HOSPITAL ENCOUNTER (OUTPATIENT)
Age: 75
Discharge: HOME | End: 2020-02-25
Payer: MEDICARE

## 2020-02-25 LAB
ALBUMIN SERPL-MCNC: 4.4 G/DL (ref 3.5–5.2)
ALBUMIN/GLOBULIN RATIO: ABNORMAL (ref 1–2.5)
ALP BLD-CCNC: 60 U/L (ref 35–104)
ALT SERPL-CCNC: 14 U/L (ref 5–33)
ANION GAP SERPL CALCULATED.3IONS-SCNC: 13 MMOL/L (ref 9–17)
AST SERPL-CCNC: 20 U/L
BILIRUB SERPL-MCNC: 1.82 MG/DL (ref 0.3–1.2)
BUN BLDV-MCNC: 15 MG/DL (ref 8–23)
BUN/CREAT BLD: 22 (ref 9–20)
CALCIUM SERPL-MCNC: 10.2 MG/DL (ref 8.6–10.4)
CHLORIDE BLD-SCNC: 100 MMOL/L (ref 98–107)
CHOLESTEROL/HDL RATIO: 2.6
CHOLESTEROL: 144 MG/DL
CO2: 30 MMOL/L (ref 20–31)
CREAT SERPL-MCNC: 0.67 MG/DL (ref 0.5–0.9)
GFR AFRICAN AMERICAN: >60 ML/MIN
GFR NON-AFRICAN AMERICAN: >60 ML/MIN
GFR SERPL CREATININE-BSD FRML MDRD: ABNORMAL ML/MIN/{1.73_M2}
GFR SERPL CREATININE-BSD FRML MDRD: ABNORMAL ML/MIN/{1.73_M2}
GLUCOSE BLD-MCNC: 114 MG/DL (ref 70–99)
HDLC SERPL-MCNC: 56 MG/DL
LDL CHOLESTEROL: 66 MG/DL (ref 0–130)
PATIENT FASTING?: YES
POTASSIUM SERPL-SCNC: 3.7 MMOL/L (ref 3.7–5.3)
SODIUM BLD-SCNC: 143 MMOL/L (ref 135–144)
TOTAL PROTEIN: 7.7 G/DL (ref 6.4–8.3)
TRIGL SERPL-MCNC: 110 MG/DL
VLDLC SERPL CALC-MCNC: NORMAL MG/DL (ref 1–30)

## 2020-02-25 PROCEDURE — 36415 COLL VENOUS BLD VENIPUNCTURE: CPT

## 2020-02-25 PROCEDURE — 80053 COMPREHEN METABOLIC PANEL: CPT

## 2020-02-25 PROCEDURE — 80061 LIPID PANEL: CPT

## 2020-03-03 ENCOUNTER — OFFICE VISIT (OUTPATIENT)
Dept: FAMILY MEDICINE CLINIC | Age: 75
End: 2020-03-03
Payer: MEDICARE

## 2020-03-03 VITALS
WEIGHT: 170 LBS | BODY MASS INDEX: 32.12 KG/M2 | DIASTOLIC BLOOD PRESSURE: 82 MMHG | OXYGEN SATURATION: 98 % | HEART RATE: 88 BPM | SYSTOLIC BLOOD PRESSURE: 132 MMHG

## 2020-03-03 PROCEDURE — G8427 DOCREV CUR MEDS BY ELIG CLIN: HCPCS | Performed by: FAMILY MEDICINE

## 2020-03-03 PROCEDURE — 99214 OFFICE O/P EST MOD 30 MIN: CPT | Performed by: FAMILY MEDICINE

## 2020-03-03 PROCEDURE — G8400 PT W/DXA NO RESULTS DOC: HCPCS | Performed by: FAMILY MEDICINE

## 2020-03-03 PROCEDURE — G8484 FLU IMMUNIZE NO ADMIN: HCPCS | Performed by: FAMILY MEDICINE

## 2020-03-03 PROCEDURE — G8510 SCR DEP NEG, NO PLAN REQD: HCPCS | Performed by: FAMILY MEDICINE

## 2020-03-03 PROCEDURE — 1090F PRES/ABSN URINE INCON ASSESS: CPT | Performed by: FAMILY MEDICINE

## 2020-03-03 PROCEDURE — 1036F TOBACCO NON-USER: CPT | Performed by: FAMILY MEDICINE

## 2020-03-03 PROCEDURE — 1123F ACP DISCUSS/DSCN MKR DOCD: CPT | Performed by: FAMILY MEDICINE

## 2020-03-03 PROCEDURE — G8417 CALC BMI ABV UP PARAM F/U: HCPCS | Performed by: FAMILY MEDICINE

## 2020-03-03 PROCEDURE — 4040F PNEUMOC VAC/ADMIN/RCVD: CPT | Performed by: FAMILY MEDICINE

## 2020-03-03 RX ORDER — PRAVASTATIN SODIUM 20 MG
20 TABLET ORAL DAILY
Qty: 30 TABLET | Refills: 5 | Status: SHIPPED | OUTPATIENT
Start: 2020-03-03 | End: 2020-08-03

## 2020-03-03 RX ORDER — HYDROCHLOROTHIAZIDE 25 MG/1
25 TABLET ORAL DAILY
Qty: 30 TABLET | Refills: 5 | Status: SHIPPED | OUTPATIENT
Start: 2020-03-03 | End: 2020-08-03

## 2020-03-03 RX ORDER — AMLODIPINE BESYLATE 5 MG/1
5 TABLET ORAL DAILY
Qty: 30 TABLET | Refills: 5 | Status: SHIPPED | OUTPATIENT
Start: 2020-03-03 | End: 2020-08-03

## 2020-03-03 ASSESSMENT — ENCOUNTER SYMPTOMS
EYE DISCHARGE: 0
CONSTIPATION: 0
SHORTNESS OF BREATH: 0
EYE REDNESS: 0
DIARRHEA: 0
COUGH: 0
ABDOMINAL PAIN: 0
VOMITING: 0
BLOOD IN STOOL: 0
FACIAL SWELLING: 0
NAUSEA: 0

## 2020-03-03 ASSESSMENT — PATIENT HEALTH QUESTIONNAIRE - PHQ9
SUM OF ALL RESPONSES TO PHQ9 QUESTIONS 1 & 2: 0
1. LITTLE INTEREST OR PLEASURE IN DOING THINGS: 0
SUM OF ALL RESPONSES TO PHQ QUESTIONS 1-9: 0
2. FEELING DOWN, DEPRESSED OR HOPELESS: 0
SUM OF ALL RESPONSES TO PHQ QUESTIONS 1-9: 0

## 2020-03-03 NOTE — PROGRESS NOTES
Vaccine (1 of 2) 01/07/1994    DEXA (modify frequency per FRAX score)  01/07/2009    Flu vaccine (1) 09/01/2019       Past Surgical History:     Past Surgical History:   Procedure Laterality Date    APPENDECTOMY      CHOLECYSTECTOMY  10/09/14    laparoscopy    COLONOSCOPY  2009    [facility]    TUBAL LIGATION          Medications:       Prior to Admission medications    Medication Sig Start Date End Date Taking? Authorizing Provider   pravastatin (PRAVACHOL) 20 MG tablet Take 1 tablet by mouth daily 3/3/20  Yes Polo Laird MD   hydroCHLOROthiazide (HYDRODIURIL) 25 MG tablet Take 1 tablet by mouth daily 3/3/20  Yes Polo Laird MD   amLODIPine (NORVASC) 5 MG tablet Take 1 tablet by mouth daily 3/3/20  Yes Polo Laird MD   vitamin D (CHOLECALCIFEROL) 1000 UNIT TABS tablet Take 1,000 Units by mouth daily   Yes Historical Provider, MD   Misc Natural Products (GLUCOSAMINE CHONDROITIN ADV) TABS Take 1 tablet by mouth 2 times daily. Glucosamine 1500mg / Chondroitin 1200mg   Yes Historical Provider, MD   Calcium-Vitamin D (CALTRATE 600 PLUS-VIT D PO) Take 2 tablets by mouth daily. Yes Historical Provider, MD   ascorbic acid (VITAMIN C) 500 MG tablet Take 500 mg by mouth daily. Yes Historical Provider, MD   vitamin E 400 UNIT capsule Take 400 Units by mouth daily. Yes Historical Provider, MD   meclizine (ANTIVERT) 25 MG tablet Take 1 tablet by mouth 3 times daily as needed for Dizziness  Patient not taking: Reported on 9/3/2019 3/5/19   Polo Laird MD        Allergies:       Patient has no known allergies. Social History:     Tobacco:    reports that she has never smoked. She has never used smokeless tobacco.  Alcohol:      reports no history of alcohol use. Drug Use:  reports no history of drug use.     Family History:     Family History   Problem Relation Age of Onset    High Blood Pressure Mother     Heart Disease Father 61    High Blood Pressure Sister     High Cholesterol Sister    

## 2020-08-03 RX ORDER — HYDROCHLOROTHIAZIDE 25 MG/1
TABLET ORAL
Qty: 30 TABLET | Refills: 5 | Status: SHIPPED | OUTPATIENT
Start: 2020-08-03 | End: 2021-02-02

## 2020-08-03 RX ORDER — AMLODIPINE BESYLATE 5 MG/1
TABLET ORAL
Qty: 30 TABLET | Refills: 5 | Status: SHIPPED | OUTPATIENT
Start: 2020-08-03 | End: 2021-02-02

## 2020-08-03 RX ORDER — PRAVASTATIN SODIUM 20 MG
TABLET ORAL
Qty: 30 TABLET | Refills: 5 | Status: SHIPPED | OUTPATIENT
Start: 2020-08-03 | End: 2021-02-02

## 2020-08-25 ENCOUNTER — HOSPITAL ENCOUNTER (OUTPATIENT)
Age: 75
Discharge: HOME | End: 2020-08-25
Payer: MEDICARE

## 2020-08-25 LAB
ALBUMIN SERPL-MCNC: 4.4 G/DL (ref 3.5–5.2)
ALBUMIN/GLOBULIN RATIO: ABNORMAL (ref 1–2.5)
ALP BLD-CCNC: 64 U/L (ref 35–104)
ALT SERPL-CCNC: 13 U/L (ref 5–33)
ANION GAP SERPL CALCULATED.3IONS-SCNC: 10 MMOL/L (ref 9–17)
AST SERPL-CCNC: 22 U/L
BILIRUB SERPL-MCNC: 1.89 MG/DL (ref 0.3–1.2)
BUN BLDV-MCNC: 14 MG/DL (ref 8–23)
BUN/CREAT BLD: 19 (ref 9–20)
CALCIUM SERPL-MCNC: 10.1 MG/DL (ref 8.6–10.4)
CHLORIDE BLD-SCNC: 102 MMOL/L (ref 98–107)
CHOLESTEROL/HDL RATIO: 3.2
CHOLESTEROL: 142 MG/DL
CO2: 31 MMOL/L (ref 20–31)
CREAT SERPL-MCNC: 0.72 MG/DL (ref 0.5–0.9)
ESTIMATED AVERAGE GLUCOSE: 117 MG/DL
GFR AFRICAN AMERICAN: >60 ML/MIN
GFR NON-AFRICAN AMERICAN: >60 ML/MIN
GFR SERPL CREATININE-BSD FRML MDRD: ABNORMAL ML/MIN/{1.73_M2}
GFR SERPL CREATININE-BSD FRML MDRD: ABNORMAL ML/MIN/{1.73_M2}
GLUCOSE BLD-MCNC: 109 MG/DL (ref 70–99)
HBA1C MFR BLD: 5.7 % (ref 4–6)
HDLC SERPL-MCNC: 45 MG/DL
LDL CHOLESTEROL: 73 MG/DL (ref 0–130)
PATIENT FASTING?: YES
POTASSIUM SERPL-SCNC: 3.8 MMOL/L (ref 3.7–5.3)
SODIUM BLD-SCNC: 143 MMOL/L (ref 135–144)
TOTAL PROTEIN: 8.8 G/DL (ref 6.4–8.3)
TRIGL SERPL-MCNC: 119 MG/DL
VLDLC SERPL CALC-MCNC: NORMAL MG/DL (ref 1–30)

## 2020-08-25 PROCEDURE — 80053 COMPREHEN METABOLIC PANEL: CPT

## 2020-08-25 PROCEDURE — 36415 COLL VENOUS BLD VENIPUNCTURE: CPT

## 2020-08-25 PROCEDURE — 80061 LIPID PANEL: CPT

## 2020-08-25 PROCEDURE — 83036 HEMOGLOBIN GLYCOSYLATED A1C: CPT

## 2020-09-01 ENCOUNTER — OFFICE VISIT (OUTPATIENT)
Dept: FAMILY MEDICINE CLINIC | Age: 75
End: 2020-09-01
Payer: MEDICARE

## 2020-09-01 VITALS
HEART RATE: 88 BPM | WEIGHT: 173 LBS | DIASTOLIC BLOOD PRESSURE: 78 MMHG | HEIGHT: 61 IN | SYSTOLIC BLOOD PRESSURE: 144 MMHG | OXYGEN SATURATION: 96 % | BODY MASS INDEX: 32.66 KG/M2

## 2020-09-01 PROCEDURE — G8427 DOCREV CUR MEDS BY ELIG CLIN: HCPCS | Performed by: FAMILY MEDICINE

## 2020-09-01 PROCEDURE — 1036F TOBACCO NON-USER: CPT | Performed by: FAMILY MEDICINE

## 2020-09-01 PROCEDURE — 4040F PNEUMOC VAC/ADMIN/RCVD: CPT | Performed by: FAMILY MEDICINE

## 2020-09-01 PROCEDURE — 1090F PRES/ABSN URINE INCON ASSESS: CPT | Performed by: FAMILY MEDICINE

## 2020-09-01 PROCEDURE — G8417 CALC BMI ABV UP PARAM F/U: HCPCS | Performed by: FAMILY MEDICINE

## 2020-09-01 PROCEDURE — 99214 OFFICE O/P EST MOD 30 MIN: CPT | Performed by: FAMILY MEDICINE

## 2020-09-01 PROCEDURE — G8400 PT W/DXA NO RESULTS DOC: HCPCS | Performed by: FAMILY MEDICINE

## 2020-09-01 PROCEDURE — 1123F ACP DISCUSS/DSCN MKR DOCD: CPT | Performed by: FAMILY MEDICINE

## 2020-09-01 ASSESSMENT — ENCOUNTER SYMPTOMS
DIARRHEA: 0
VOMITING: 0
ABDOMINAL PAIN: 0
SHORTNESS OF BREATH: 0
EYE REDNESS: 0
SORE THROAT: 0
CHANGE IN BOWEL HABIT: 0
VISUAL CHANGE: 0
CONSTIPATION: 0
NAUSEA: 0
FACIAL SWELLING: 0
EYE DISCHARGE: 0
COUGH: 0
BLOOD IN STOOL: 0

## 2020-09-01 NOTE — PROGRESS NOTES
HPI Notes    Name: Omid Toribio  : 1945        Chief Complaint:     Chief Complaint   Patient presents with    Hypertension    Hyperlipidemia    Hyperglycemia     20  HgA1C - 5.7       History of Present Illness:     Omid Toribio is a 67 y.o.  female who presents with Hypertension; Hyperlipidemia; and Hyperglycemia (20  HgA1C - 5.7)      Hypertension   This is a chronic problem. The current episode started more than 1 year ago. The problem is unchanged. The problem is controlled. Pertinent negatives include no chest pain, headaches, palpitations, peripheral edema or shortness of breath. There are no associated agents to hypertension. Risk factors for coronary artery disease include dyslipidemia and post-menopausal state. The current treatment provides significant improvement. Hyperlipidemia   This is a chronic problem. The current episode started more than 1 year ago. The problem is controlled. She has no history of diabetes or hypothyroidism. Pertinent negatives include no chest pain or shortness of breath. Current antihyperlipidemic treatment includes statins. The current treatment provides significant improvement of lipids. Risk factors for coronary artery disease include dyslipidemia, hypertension and post-menopausal.   Hyperglycemia   This is a chronic problem. The current episode started more than 1 year ago. The problem has been unchanged. Pertinent negatives include no abdominal pain, change in bowel habit, chest pain, chills, congestion, coughing, fatigue, fever, headaches, joint swelling, nausea, numbness, rash, sore throat, visual change or vomiting.      Elevate LFT -  Pt has elevated total protein on recent labs   Past Medical History:     Past Medical History:   Diagnosis Date    Hyperlipidemia     Hypertension     Kidney stone       Reviewed all health maintenance requirements and ordered appropriate tests  Health Maintenance Due   Topic Date Due    DTaP/Tdap/Td Musculoskeletal:      Right lower leg: No edema. Left lower leg: No edema. Lymphadenopathy:      Cervical: No cervical adenopathy. Skin:     Findings: No erythema or rash. Neurological:      General: No focal deficit present. Mental Status: She is alert and oriented to person, place, and time. Psychiatric:         Mood and Affect: Mood normal.         Behavior: Behavior normal.         Vitals:  BP (!) 148/98 (Site: Right Upper Arm, Cuff Size: Large Adult)   Pulse 88   Ht 5' 1\" (1.549 m)   Wt 173 lb (78.5 kg)   SpO2 96%   BMI 32.69 kg/m²       Data:     Lab Results   Component Value Date     08/25/2020    K 3.8 08/25/2020     08/25/2020    CO2 31 08/25/2020    BUN 14 08/25/2020    CREATININE 0.72 08/25/2020    GLUCOSE 109 08/25/2020    GLUCOSE 102 03/02/2012    PROT 8.8 08/25/2020    LABALBU 4.4 08/25/2020    LABALBU 4.6 03/02/2012    BILITOT 1.89 08/25/2020    ALKPHOS 64 08/25/2020    AST 22 08/25/2020    ALT 13 08/25/2020     No results found for: WBC, RBC, HGB, HCT, MCV, MCH, MCHC, RDW, PLT, MPV  No results found for: TSH  Lab Results   Component Value Date    CHOL 142 08/25/2020    HDL 45 08/25/2020    LABA1C 5.7 08/25/2020          Assessment/Plan:        1. Essential hypertension, benign  Stable on the Norvasc and HCTZ    2. Pure hypercholesterolemia  stable on pravachol and labs in 6mos    3. Hyperglycemia  Stable with hgba1c 5.7    4. Elevated Total protein  Anival in 1mos     Pt refuses the DEXA scan as doesn't want to go to hospital any more than has to with Shelley Pyle received counseling on the following healthy behaviors: nutrition and exercise  Reviewed prior labs and health maintenance  Continue current medications, diet and exercise. Discussed use, benefit, and side effects of prescribed medications. Barriers to medication compliance addressed.   Patient given educational materials - see patient instructions  Was a self-tracking handout given in paper form or via [facility]? Yes    Requested Prescriptions      No prescriptions requested or ordered in this encounter       All patient questions answered. Patient voiced understanding. Quality Measures    Body mass index is 32.69 kg/m². Elevated. Weight control planned discussed Healthy diet and regular exercise. BP: (!) 148/98. Blood pressure is high. Treatment plan consists of Patient In-home Blood Pressure Monitoring and No treatment change needed. Fall Risk 9/3/2019 3/6/2018 9/6/2016 9/1/2015 9/3/2014   2 or more falls in past year? yes no no no no   Fall with injury in past year? no no no no no     The patient does not have a history of falls. I did not - not indicated , complete a risk assessment for falls. A plan of care for falls No Treatment plan indicated    Lab Results   Component Value Date    LDLCHOLESTEROL 73 08/25/2020    (goal LDL reduction with dx if diabetes is 50% LDL reduction)    PHQ Scores 3/3/2020 9/3/2019 3/5/2019 3/6/2018 9/6/2016 9/1/2015 9/3/2014   PHQ2 Score 0 0 0 0 0 0 0   PHQ9 Score 0 0 0 0 0 0 0     Interpretation of Total Score Depression Severity: 1-4 = Minimal depression, 5-9 = Mild depression, 10-14 = Moderate depression, 15-19 = Moderately severe depression, 20-27 = Severe depression        Return in about 6 months (around 3/1/2021) for HTN, Hyperlipidemia.       Electronically signed by Cyn Smith MD on 9/1/2020 at 10:36 AM

## 2020-09-01 NOTE — PATIENT INSTRUCTIONS
SURVEY:    You may be receiving a survey from [employer] regarding your visit today. Please complete the survey to enable us to provide the highest quality of care to you and your family. If you cannot score us a very good (5 stars) on any question, please call the office to discuss how we could have made your experience a very good one. Thank you.     Clinical Care Team:  MD Benjamin Saldivar LPN    Clerical Team:  Louise Jamison

## 2020-09-16 ENCOUNTER — TELEPHONE (OUTPATIENT)
Dept: FAMILY MEDICINE CLINIC | Age: 75
End: 2020-09-16

## 2020-09-29 ENCOUNTER — HOSPITAL ENCOUNTER (OUTPATIENT)
Age: 75
Discharge: HOME | End: 2020-09-29
Payer: MEDICARE

## 2020-09-29 LAB — TOTAL PROTEIN: 7.5 G/DL (ref 6.4–8.3)

## 2020-09-29 PROCEDURE — 84155 ASSAY OF PROTEIN SERUM: CPT

## 2020-09-29 PROCEDURE — 36415 COLL VENOUS BLD VENIPUNCTURE: CPT

## 2021-02-01 DIAGNOSIS — E78.00 PURE HYPERCHOLESTEROLEMIA: ICD-10-CM

## 2021-02-01 DIAGNOSIS — I10 ESSENTIAL HYPERTENSION, BENIGN: ICD-10-CM

## 2021-02-02 RX ORDER — HYDROCHLOROTHIAZIDE 25 MG/1
TABLET ORAL
Qty: 30 TABLET | Refills: 5 | Status: SHIPPED | OUTPATIENT
Start: 2021-02-02 | End: 2021-08-31 | Stop reason: SDUPTHER

## 2021-02-02 RX ORDER — AMLODIPINE BESYLATE 5 MG/1
TABLET ORAL
Qty: 30 TABLET | Refills: 5 | Status: SHIPPED | OUTPATIENT
Start: 2021-02-02 | End: 2021-07-26 | Stop reason: ALTCHOICE

## 2021-02-02 RX ORDER — PRAVASTATIN SODIUM 20 MG
TABLET ORAL
Qty: 30 TABLET | Refills: 5 | Status: SHIPPED | OUTPATIENT
Start: 2021-02-02 | End: 2021-07-27

## 2021-02-25 ENCOUNTER — HOSPITAL ENCOUNTER (OUTPATIENT)
Age: 76
Discharge: HOME | End: 2021-02-25
Payer: MEDICARE

## 2021-02-25 DIAGNOSIS — I10 ESSENTIAL HYPERTENSION, BENIGN: ICD-10-CM

## 2021-02-25 DIAGNOSIS — E78.00 PURE HYPERCHOLESTEROLEMIA: ICD-10-CM

## 2021-02-25 LAB
ALBUMIN SERPL-MCNC: 4.4 G/DL (ref 3.5–5.2)
ALBUMIN/GLOBULIN RATIO: ABNORMAL (ref 1–2.5)
ALP BLD-CCNC: 60 U/L (ref 35–104)
ALT SERPL-CCNC: 12 U/L (ref 5–33)
ANION GAP SERPL CALCULATED.3IONS-SCNC: 8 MMOL/L (ref 9–17)
AST SERPL-CCNC: 22 U/L
BILIRUB SERPL-MCNC: 1.75 MG/DL (ref 0.3–1.2)
BUN BLDV-MCNC: 15 MG/DL (ref 8–23)
BUN/CREAT BLD: 23 (ref 9–20)
CALCIUM SERPL-MCNC: 10 MG/DL (ref 8.6–10.4)
CHLORIDE BLD-SCNC: 103 MMOL/L (ref 98–107)
CHOLESTEROL/HDL RATIO: 2.6
CHOLESTEROL: 129 MG/DL
CO2: 32 MMOL/L (ref 20–31)
CREAT SERPL-MCNC: 0.66 MG/DL (ref 0.5–0.9)
GFR AFRICAN AMERICAN: >60 ML/MIN
GFR NON-AFRICAN AMERICAN: >60 ML/MIN
GFR SERPL CREATININE-BSD FRML MDRD: ABNORMAL ML/MIN/{1.73_M2}
GFR SERPL CREATININE-BSD FRML MDRD: ABNORMAL ML/MIN/{1.73_M2}
GLUCOSE BLD-MCNC: 109 MG/DL (ref 70–99)
HDLC SERPL-MCNC: 49 MG/DL
LDL CHOLESTEROL: 65 MG/DL (ref 0–130)
PATIENT FASTING?: YES
POTASSIUM SERPL-SCNC: 3.8 MMOL/L (ref 3.7–5.3)
SODIUM BLD-SCNC: 143 MMOL/L (ref 135–144)
TOTAL PROTEIN: 7.1 G/DL (ref 6.4–8.3)
TRIGL SERPL-MCNC: 75 MG/DL
VLDLC SERPL CALC-MCNC: NORMAL MG/DL (ref 1–30)

## 2021-02-25 PROCEDURE — 80053 COMPREHEN METABOLIC PANEL: CPT

## 2021-02-25 PROCEDURE — 36415 COLL VENOUS BLD VENIPUNCTURE: CPT

## 2021-02-25 PROCEDURE — 80061 LIPID PANEL: CPT

## 2021-03-02 ENCOUNTER — OFFICE VISIT (OUTPATIENT)
Dept: FAMILY MEDICINE CLINIC | Age: 76
End: 2021-03-02
Payer: MEDICARE

## 2021-03-02 VITALS
SYSTOLIC BLOOD PRESSURE: 138 MMHG | WEIGHT: 174 LBS | DIASTOLIC BLOOD PRESSURE: 90 MMHG | OXYGEN SATURATION: 96 % | HEART RATE: 78 BPM | BODY MASS INDEX: 32.85 KG/M2 | HEIGHT: 61 IN

## 2021-03-02 DIAGNOSIS — I10 ESSENTIAL HYPERTENSION, BENIGN: ICD-10-CM

## 2021-03-02 DIAGNOSIS — Z00.00 ROUTINE GENERAL MEDICAL EXAMINATION AT A HEALTH CARE FACILITY: Primary | ICD-10-CM

## 2021-03-02 DIAGNOSIS — R73.9 HYPERGLYCEMIA: ICD-10-CM

## 2021-03-02 DIAGNOSIS — E78.00 PURE HYPERCHOLESTEROLEMIA: ICD-10-CM

## 2021-03-02 PROCEDURE — G0438 PPPS, INITIAL VISIT: HCPCS | Performed by: FAMILY MEDICINE

## 2021-03-02 PROCEDURE — 99214 OFFICE O/P EST MOD 30 MIN: CPT | Performed by: FAMILY MEDICINE

## 2021-03-02 PROCEDURE — G8484 FLU IMMUNIZE NO ADMIN: HCPCS | Performed by: FAMILY MEDICINE

## 2021-03-02 PROCEDURE — 1090F PRES/ABSN URINE INCON ASSESS: CPT | Performed by: FAMILY MEDICINE

## 2021-03-02 PROCEDURE — G8417 CALC BMI ABV UP PARAM F/U: HCPCS | Performed by: FAMILY MEDICINE

## 2021-03-02 PROCEDURE — G8427 DOCREV CUR MEDS BY ELIG CLIN: HCPCS | Performed by: FAMILY MEDICINE

## 2021-03-02 PROCEDURE — 1036F TOBACCO NON-USER: CPT | Performed by: FAMILY MEDICINE

## 2021-03-02 PROCEDURE — 1123F ACP DISCUSS/DSCN MKR DOCD: CPT | Performed by: FAMILY MEDICINE

## 2021-03-02 PROCEDURE — G8400 PT W/DXA NO RESULTS DOC: HCPCS | Performed by: FAMILY MEDICINE

## 2021-03-02 PROCEDURE — 4040F PNEUMOC VAC/ADMIN/RCVD: CPT | Performed by: FAMILY MEDICINE

## 2021-03-02 ASSESSMENT — ENCOUNTER SYMPTOMS
SORE THROAT: 0
EYE REDNESS: 0
EYE DISCHARGE: 0
COUGH: 0
DIARRHEA: 0
ABDOMINAL PAIN: 0
VOMITING: 0
BLOOD IN STOOL: 0
CONSTIPATION: 0
SHORTNESS OF BREATH: 0
NAUSEA: 0

## 2021-03-02 ASSESSMENT — PATIENT HEALTH QUESTIONNAIRE - PHQ9
SUM OF ALL RESPONSES TO PHQ9 QUESTIONS 1 & 2: 0
SUM OF ALL RESPONSES TO PHQ QUESTIONS 1-9: 0

## 2021-03-02 ASSESSMENT — LIFESTYLE VARIABLES: HOW OFTEN DO YOU HAVE A DRINK CONTAINING ALCOHOL: 0

## 2021-03-02 NOTE — PATIENT INSTRUCTIONS
SURVEY:    You may be receiving a survey from [employer] regarding your visit today. Please complete the survey to enable us to provide the highest quality of care to you and your family. If you cannot score us a very good (5 stars) on any question, please call the office to discuss how we could have made your experience a very good one. Thank you. Clinical Care Team:  MD Steph Blackburn LPN    Clerical Team:  Catherine Reynolds    Personalized Preventive Plan for Tamanna Alexandra - 3/2/2021  Medicare offers a range of preventive health benefits. Some of the tests and screenings are paid in full while other may be subject to a deductible, co-insurance, and/or copay. Some of these benefits include a comprehensive review of your medical history including lifestyle, illnesses that may run in your family, and various assessments and screenings as appropriate. After reviewing your medical record and screening and assessments performed today your provider may have ordered immunizations, labs, imaging, and/or referrals for you. A list of these orders (if applicable) as well as your Preventive Care list are included within your After Visit Summary for your review. Other Preventive Recommendations:    · A preventive eye exam performed by an eye specialist is recommended every 1-2 years to screen for glaucoma; cataracts, macular degeneration, and other eye disorders. · A preventive dental visit is recommended every 6 months. · Try to get at least 150 minutes of exercise per week or 10,000 steps per day on a pedometer . · Order or download the FREE \"Exercise & Physical Activity: Your Everyday Guide\" from [employer]. Call 2-911.563.2605 or search [employer] online. · You need 0292-2196 mg of calcium and 2702-4517 IU of vitamin D per day. It is possible to meet your calcium requirement with diet alone, but a vitamin D supplement is usually necessary to meet this goal.  · When exposed to the sun, use a sunscreen that protects against both UVA and UVB radiation with an SPF of 30 or greater. Reapply every 2 to 3 hours or after sweating, drying off with a towel, or swimming. · Always wear a seat belt when traveling in a car. Always wear a helmet when riding a bicycle or motorcycle.

## 2021-03-02 NOTE — PROGRESS NOTES
HPI Notes    Name: Brad Mansfield  : 1945        Chief Complaint:     Chief Complaint   Patient presents with    Annual Exam     Pt presents today for AWV    Hyperlipidemia    Hyperglycemia     8/20  HgA1C - 5.7       History of Present Illness:     Brad Mansfield is a 67 y.o.  female who presents with Annual Exam (Pt presents today for AWV), Hyperlipidemia, and Hyperglycemia (8/25/20  HgA1C - 5.7)      Hyperlipidemia  This is a chronic problem. The current episode started more than 1 year ago. The problem is controlled. Recent lipid tests were reviewed and are normal. She has no history of diabetes or hypothyroidism. Pertinent negatives include no chest pain or shortness of breath. Current antihyperlipidemic treatment includes statins. The current treatment provides significant improvement of lipids. Risk factors for coronary artery disease include dyslipidemia and hypertension. Hyperglycemia  This is a chronic problem. The current episode started more than 1 year ago. The problem has been unchanged. Pertinent negatives include no abdominal pain, chest pain, chills, congestion, coughing, fatigue, fever, headaches, joint swelling, nausea, neck pain, rash, sore throat or vomiting. Treatments tried: diet  The treatment provided significant relief. Hypertension  This is a chronic problem. The current episode started more than 1 year ago. The problem is unchanged. The problem is controlled. Pertinent negatives include no chest pain, headaches, neck pain, palpitations, peripheral edema or shortness of breath. Risk factors for coronary artery disease include dyslipidemia and post-menopausal state. The current treatment provides significant improvement.        Past Medical History:     Past Medical History:   Diagnosis Date    Hyperlipidemia     Hypertension     Kidney stone       Reviewed all health maintenance requirements and ordered appropriate tests  Health Maintenance Due   Topic Date Due  Hepatitis C screen  Never done    COVID-19 Vaccine (1 of 2) Never done    DTaP/Tdap/Td vaccine (1 - Tdap) Never done    Shingles Vaccine (1 of 2) Never done    DEXA (modify frequency per FRAX score)  Never done    Annual Wellness Visit (AWV)  Never done    Flu vaccine (1) 09/01/2020       Past Surgical History:     Past Surgical History:   Procedure Laterality Date    APPENDECTOMY      CHOLECYSTECTOMY  10/09/14    laparoscopy    COLONOSCOPY  2009    wnl    TUBAL LIGATION          Medications:       Prior to Admission medications    Medication Sig Start Date End Date Taking? Authorizing Provider   pravastatin (PRAVACHOL) 20 MG tablet TAKE 1 TABLET BY MOUTH EVERY DAY 2/2/21  Yes Carlen Bumpers, MD   amLODIPine (NORVASC) 5 MG tablet TAKE 1 TABLET BY MOUTH EVERY DAY 2/2/21  Yes Carlen Bumpers, MD   hydroCHLOROthiazide (HYDRODIURIL) 25 MG tablet TAKE 1 TABLET BY MOUTH EVERY DAY 2/2/21  Yes Carlen Bumpers, MD   vitamin D (CHOLECALCIFEROL) 1000 UNIT TABS tablet Take 1,000 Units by mouth daily   Yes Historical Provider, MD   Misc Natural Products (GLUCOSAMINE CHONDROITIN ADV) TABS Take 1 tablet by mouth 2 times daily. Glucosamine 1500mg / Chondroitin 1200mg   Yes Historical Provider, MD   Calcium-Vitamin D (CALTRATE 600 PLUS-VIT D PO) Take 2 tablets by mouth daily. Yes Historical Provider, MD   ascorbic acid (VITAMIN C) 500 MG tablet Take 500 mg by mouth daily. Yes Historical Provider, MD   vitamin E 400 UNIT capsule Take 400 Units by mouth daily. Yes Historical Provider, MD   meclizine (ANTIVERT) 25 MG tablet Take 1 tablet by mouth 3 times daily as needed for Dizziness  Patient not taking: Reported on 9/3/2019 3/5/19   Carlen Bumpers, MD        Allergies:       Patient has no known allergies. Social History:     Tobacco:    reports that she has never smoked. She has never used smokeless tobacco.  Alcohol:      reports no history of alcohol use. Drug Use:  reports no history of drug use. Family History:     Family History   Problem Relation Age of Onset    High Blood Pressure Mother     Heart Disease Father 61    High Blood Pressure Sister     High Cholesterol Sister     Cancer Brother         colon    High Blood Pressure Brother     High Cholesterol Brother        Review of Systems:       Review of Systems   Constitutional: Negative for chills, fatigue, fever and unexpected weight change. HENT: Negative for congestion and sore throat. Eyes: Negative for discharge, redness and visual disturbance. Respiratory: Negative for cough and shortness of breath. Cardiovascular: Negative for chest pain, palpitations and leg swelling. Gastrointestinal: Negative for abdominal pain, blood in stool, constipation, diarrhea, nausea and vomiting. Genitourinary: Negative for dysuria and hematuria. Musculoskeletal: Negative for joint swelling and neck pain. Skin: Negative for rash. Neurological: Negative for dizziness, light-headedness and headaches. Psychiatric/Behavioral: Negative for confusion and sleep disturbance. Physical Exam:     Physical Exam  Vitals signs reviewed. Constitutional:       General: She is not in acute distress. Appearance: Normal appearance. She is well-developed. She is not ill-appearing. HENT:      Head: Normocephalic and atraumatic. Right Ear: Tympanic membrane normal.      Left Ear: Tympanic membrane normal.   Eyes:      General:         Right eye: No discharge. Left eye: No discharge. Conjunctiva/sclera: Conjunctivae normal.      Pupils: Pupils are equal, round, and reactive to light. Neck:      Musculoskeletal: Neck supple. Thyroid: No thyromegaly. Vascular: No carotid bruit. Cardiovascular:      Rate and Rhythm: Normal rate and regular rhythm. Heart sounds: Normal heart sounds. No murmur. Pulmonary:      Effort: Pulmonary effort is normal.      Breath sounds: Normal breath sounds.    Abdominal: General: Bowel sounds are normal. There is no distension. Palpations: Abdomen is soft. Tenderness: There is no abdominal tenderness. Musculoskeletal:      Right lower leg: No edema. Left lower leg: No edema. Lymphadenopathy:      Cervical: No cervical adenopathy. Skin:     Findings: No erythema or rash. Neurological:      General: No focal deficit present. Mental Status: She is alert and oriented to person, place, and time. Psychiatric:         Mood and Affect: Mood normal.         Behavior: Behavior normal.         Vitals:  BP (!) 138/90 (Site: Left Upper Arm, Cuff Size: Medium Adult)   Pulse 78   Ht 5' 1\" (1.549 m)   Wt 174 lb (78.9 kg)   SpO2 96%   BMI 32.88 kg/m²       Data:     Lab Results   Component Value Date     02/25/2021    K 3.8 02/25/2021     02/25/2021    CO2 32 02/25/2021    BUN 15 02/25/2021    CREATININE 0.66 02/25/2021    GLUCOSE 109 02/25/2021    GLUCOSE 102 03/02/2012    PROT 7.1 02/25/2021    LABALBU 4.4 02/25/2021    LABALBU 4.6 03/02/2012    BILITOT 1.75 02/25/2021    ALKPHOS 60 02/25/2021    AST 22 02/25/2021    ALT 12 02/25/2021     No results found for: WBC, RBC, HGB, HCT, MCV, MCH, MCHC, RDW, PLT, MPV  No results found for: TSH  Lab Results   Component Value Date    CHOL 129 02/25/2021    HDL 49 02/25/2021    LABA1C 5.7 08/25/2020          Assessment/Plan:        1. Pure hypercholesterolemia  Stable on pravachol and labs in 6mos  - Lipid Panel; Future  - Comprehensive Metabolic Panel; Future    2. Hyperglycemia  Stable 6mos labs  - Hemoglobin A1C; Future    3. Essential hypertension, benign  Stable and no changes and labs in 6mos  - Comprehensive Metabolic Panel; Future    Mammogram in Fall--- pt would like one. Eva received counseling on the following healthy behaviors: nutrition and exercise  Reviewed prior labs and health maintenance  Continue current medications, diet and exercise. Discussed use, benefit, and side effects of prescribed medications. Barriers to medication compliance addressed. Patient given educational materials - see patient instructions  Was a self-tracking handout given in paper form or via [employer]? Yes    Requested Prescriptions      No prescriptions requested or ordered in this encounter       All patient questions answered. Patient voiced understanding. Quality Measures    Body mass index is 32.88 kg/m². Elevated. Weight control planned discussed Healthy diet and regular exercise. BP: (!) 138/90. Blood pressure is high. Treatment plan consists of No treatment change needed. Fall Risk 3/2/2021 9/3/2019 3/6/2018 9/6/2016 9/1/2015 9/3/2014   2 or more falls in past year? no yes no no no no   Fall with injury in past year? no no no no no no     The patient does not have a history of falls. I did not - not indicated , complete a risk assessment for falls. A plan of care for falls No Treatment plan indicated    Lab Results   Component Value Date    LDLCHOLESTEROL 65 02/25/2021    (goal LDL reduction with dx if diabetes is 50% LDL reduction)    PHQ Scores 3/2/2021 3/3/2020 9/3/2019 3/5/2019 3/6/2018 9/6/2016 9/1/2015   PHQ2 Score 0 0 0 0 0 0 0   PHQ9 Score 0 0 0 0 0 0 0     Interpretation of Total Score Depression Severity: 1-4 = Minimal depression, 5-9 = Mild depression, 10-14 = Moderate depression, 15-19 = Moderately severe depression, 20-27 = Severe depression        Return in about 6 months (around 9/2/2021) for HTN, Hyperlipidemia.       Electronically signed by Po Jacobsen MD on 3/3/2021 at 7:11 AM

## 2021-03-04 NOTE — PROGRESS NOTES
Medicare Annual Wellness Visit  Name: Carl Eddy Date: 3/3/2021   MRN: [id number] Sex: Female   Age: 67 y.o. Ethnicity: Non-/Non    : 1945 Race: White      Eva Alexandra is here for Annual Exam (Pt presents today for AWV), Hyperlipidemia, and Hyperglycemia (20  HgA1C - 5.7)    Screenings for behavioral, psychosocial and functional/safety risks, and cognitive dysfunction are all negative except as indicated below. These results, as well as other patient data from the [address] form, are documented in Flowsheets linked to this Encounter. No Known Allergies    Prior to Visit Medications    Medication Sig Taking? Authorizing Provider   pravastatin (PRAVACHOL) 20 MG tablet TAKE 1 TABLET BY MOUTH EVERY DAY Yes Zainab Cordova MD   amLODIPine (NORVASC) 5 MG tablet TAKE 1 TABLET BY MOUTH EVERY DAY Yes Zainab Cordova MD   hydroCHLOROthiazide (HYDRODIURIL) 25 MG tablet TAKE 1 TABLET BY MOUTH EVERY DAY Yes Zainab Cordova MD   vitamin D (CHOLECALCIFEROL) 1000 UNIT TABS tablet Take 1,000 Units by mouth daily Yes Historical Provider, MD   Misc Natural Products (GLUCOSAMINE CHONDROITIN ADV) TABS Take 1 tablet by mouth 2 times daily. Glucosamine 1500mg / Chondroitin 1200mg Yes Historical Provider, MD   Calcium-Vitamin D (CALTRATE 600 PLUS-VIT D PO) Take 2 tablets by mouth daily. Yes Historical Provider, MD   ascorbic acid (VITAMIN C) 500 MG tablet Take 500 mg by mouth daily. Yes Historical Provider, MD   vitamin E 400 UNIT capsule Take 400 Units by mouth daily.  Yes Historical Provider, MD   meclizine (ANTIVERT) 25 MG tablet Take 1 tablet by mouth 3 times daily as needed for Dizziness  Patient not taking: Reported on 9/3/2019  Zainab Cordova MD       Past Medical History:   Diagnosis Date    Hyperlipidemia     Hypertension     Kidney stone        Past Surgical History:   Procedure Laterality Date    APPENDECTOMY      CHOLECYSTECTOMY  10/09/14    laparoscopy  COLONOSCOPY  2009    wnl    TUBAL LIGATION         Family History   Problem Relation Age of Onset    High Blood Pressure Mother     Heart Disease Father 61    High Blood Pressure Sister     High Cholesterol Sister     Cancer Brother         colon    High Blood Pressure Brother     High Cholesterol Brother        CareTeam (Including outside providers/suppliers regularly involved in providing care):   Patient Care Team:  Aron Holden MD as PCP - Radha Pope MD as PCP - [facility] Empaneled Provider    Wt Readings from Last 3 Encounters:   03/02/21 174 lb (78.9 kg)   09/01/20 173 lb (78.5 kg)   03/03/20 170 lb (77.1 kg)     Vitals:    03/02/21 0944 03/02/21 0952   BP: (!) 136/90 (!) 138/90   Site: Right Upper Arm Left Upper Arm   Cuff Size: Medium Adult Medium Adult   Pulse: 78    SpO2: 96%    Weight: 174 lb (78.9 kg)    Height: 5' 1\" (1.549 m)      Body mass index is 32.88 kg/m². Based upon direct observation of the patient, evaluation of cognition reveals recent and remote memory intact. Patient's complete Health Risk Assessment and screening values have been reviewed and are found in Flowsheets. The following problems were reviewed today and where indicated follow up appointments were made and/or referrals ordered. Positive Risk Factor Screenings with Interventions:      Cognitive: Words recalled: 2 Words Recalled  Clock Drawing Test (CDT) Score: Normal  Total Score Interpretation: Positive Mini-Cog  Did the patient refuse to take the cognition test?: No  Cognitive Impairment Interventions:  · Patient declines any further evaluation/treatment for cognitive impairment       General Health and ACP:  General  In general, how would you say your health is?: Very Good  In the past 7 days, have you experienced any of the following?  New or Increased Pain, New or Increased Fatigue, Loneliness, Social Isolation, Stress or Anger?: None of These Do you get the social and emotional support that you need?: Yes  Do you have a Living Will?: Yes  Advance Directives     Power of [facility] Will ACP-Advance Directive ACP-Power of     Not on File Not on File Not on File Not on File      General Health Risk Interventions:  · doing well    Health Habits/Nutrition:  Health Habits/Nutrition  Do you exercise for at least 20 minutes 2-3 times per week?: Yes  Have you lost any weight without trying in the past 3 months?: No  Do you eat only one meal per day?: No  Have you seen the dentist within the past year?: Yes  Body mass index: (!) 32.87  Health Habits/Nutrition Interventions:  · Inadequate physical activity:  try to walk more    Hearing/Vision:  No exam data present  Hearing/Vision  Do you or your family notice any trouble with your hearing that hasn't been managed with hearing aids?: No  Do you have difficulty driving, watching TV, or doing any of your daily activities because of your eyesight?: No  Have you had an eye exam within the past year?: (!) No  Hearing/Vision Interventions:  · Vision concerns:  patient encouraged to make appointment with his/her eye specialist      Personalized Preventive Plan   Current Health Maintenance Status  Immunization History   Administered Date(s) Administered    Influenza Whole 10/30/2007    Pneumococcal Conjugate 13-valent ([id number]) 03/06/2017    Pneumococcal Polysaccharide ([id number]) 03/06/2018        Health Maintenance   Topic Date Due    Hepatitis C screen  Never done    COVID-19 Vaccine (1 of 2) Never done    DTaP/Tdap/Td vaccine (1 - Tdap) Never done    Shingles Vaccine (1 of 2) Never done    DEXA (modify frequency per FRAX score)  Never done   [employer] Visit (AWV)  Never done    Flu vaccine (1) 09/01/2020    Lipid screen  02/25/2022    Potassium monitoring  02/25/2022    Creatinine monitoring  02/25/2022    Pneumococcal 65+ years Vaccine  Completed  Hepatitis A vaccine  Aged Out    Hepatitis B vaccine  Aged Out    Hib vaccine  Aged Out    Meningococcal (ACWY) vaccine  Aged Out     Recommendations for Mems-ID Due: see orders and patient instructions/AVS.  . Recommended screening schedule for the next 5-10 years is provided to the patient in written form: see Patient Instructions/AVS.    Alba Reyes was seen today for annual exam, hyperlipidemia and hyperglycemia. Diagnoses and all orders for this visit:    Essential hypertension, benign  -     Comprehensive Metabolic Panel; Future    Pure hypercholesterolemia  -     Lipid Panel; Future  -     Comprehensive Metabolic Panel;  Future    Hyperglycemia  -     Hemoglobin A1C; Future

## 2021-06-24 ENCOUNTER — HOSPITAL ENCOUNTER (EMERGENCY)
Age: 76
Discharge: ANOTHER HOSPITAL | End: 2021-06-24
Attending: INTERNAL MEDICINE
Payer: MEDICARE

## 2021-06-24 ENCOUNTER — APPOINTMENT (OUTPATIENT)
Dept: GENERAL RADIOLOGY | Age: 76
End: 2021-06-24
Payer: MEDICARE

## 2021-06-24 VITALS
WEIGHT: 175 LBS | RESPIRATION RATE: 18 BRPM | BODY MASS INDEX: 32.2 KG/M2 | OXYGEN SATURATION: 93 % | DIASTOLIC BLOOD PRESSURE: 68 MMHG | HEART RATE: 102 BPM | TEMPERATURE: 99.5 F | HEIGHT: 62 IN | SYSTOLIC BLOOD PRESSURE: 121 MMHG

## 2021-06-24 DIAGNOSIS — E87.6 HYPOKALEMIA: ICD-10-CM

## 2021-06-24 DIAGNOSIS — J12.82 PNEUMONIA DUE TO COVID-19 VIRUS: Primary | ICD-10-CM

## 2021-06-24 DIAGNOSIS — R09.02 HYPOXIA: ICD-10-CM

## 2021-06-24 DIAGNOSIS — U07.1 PNEUMONIA DUE TO COVID-19 VIRUS: Primary | ICD-10-CM

## 2021-06-24 DIAGNOSIS — R74.8 ELEVATED LIVER ENZYMES: ICD-10-CM

## 2021-06-24 DIAGNOSIS — K52.9 GASTROENTERITIS: ICD-10-CM

## 2021-06-24 LAB
ABSOLUTE EOS #: 0 K/UL (ref 0–0.4)
ABSOLUTE IMMATURE GRANULOCYTE: ABNORMAL K/UL (ref 0–0.3)
ABSOLUTE LYMPH #: 0.6 K/UL (ref 1–4.8)
ABSOLUTE MONO #: 0.5 K/UL (ref 0–1)
ALBUMIN SERPL-MCNC: 3.7 G/DL (ref 3.5–5.2)
ALBUMIN/GLOBULIN RATIO: ABNORMAL (ref 1–2.5)
ALP BLD-CCNC: 57 U/L (ref 35–104)
ALT SERPL-CCNC: 37 U/L (ref 5–33)
ANION GAP SERPL CALCULATED.3IONS-SCNC: 11 MMOL/L (ref 9–17)
AST SERPL-CCNC: 77 U/L
BASOPHILS # BLD: 0 % (ref 0–2)
BASOPHILS ABSOLUTE: 0 K/UL (ref 0–0.2)
BILIRUB SERPL-MCNC: 0.8 MG/DL (ref 0.3–1.2)
BNP INTERPRETATION: NORMAL
BUN BLDV-MCNC: 10 MG/DL (ref 8–23)
BUN/CREAT BLD: ABNORMAL (ref 9–20)
CALCIUM SERPL-MCNC: 8.7 MG/DL (ref 8.6–10.4)
CHLORIDE BLD-SCNC: 92 MMOL/L (ref 98–107)
CO2: 31 MMOL/L (ref 20–31)
CREAT SERPL-MCNC: 0.65 MG/DL (ref 0.5–0.9)
DIFFERENTIAL TYPE: YES
EOSINOPHILS RELATIVE PERCENT: 0 % (ref 0–5)
GFR AFRICAN AMERICAN: >60 ML/MIN
GFR NON-AFRICAN AMERICAN: >60 ML/MIN
GFR SERPL CREATININE-BSD FRML MDRD: ABNORMAL ML/MIN/{1.73_M2}
GFR SERPL CREATININE-BSD FRML MDRD: ABNORMAL ML/MIN/{1.73_M2}
GLUCOSE BLD-MCNC: 151 MG/DL (ref 70–99)
HCT VFR BLD CALC: 45 % (ref 36–46)
HEMOGLOBIN: 15.4 G/DL (ref 12–16)
IMMATURE GRANULOCYTES: ABNORMAL %
LACTIC ACID, SEPSIS WHOLE BLOOD: NORMAL MMOL/L (ref 0.5–1.9)
LACTIC ACID, SEPSIS: 1.2 MMOL/L (ref 0.5–1.9)
LYMPHOCYTES # BLD: 16 % (ref 15–40)
MAGNESIUM: 2 MG/DL (ref 1.6–2.6)
MCH RBC QN AUTO: 29.8 PG (ref 26–34)
MCHC RBC AUTO-ENTMCNC: 34.2 G/DL (ref 31–37)
MCV RBC AUTO: 87.1 FL (ref 80–100)
MONOCYTES # BLD: 13 % (ref 4–8)
NRBC AUTOMATED: ABNORMAL PER 100 WBC
PDW BLD-RTO: 12.9 % (ref 12.1–15.2)
PLATELET # BLD: 195 K/UL (ref 140–450)
PLATELET ESTIMATE: ABNORMAL
PMV BLD AUTO: ABNORMAL FL (ref 6–12)
POTASSIUM SERPL-SCNC: 3 MMOL/L (ref 3.7–5.3)
PRO-BNP: 94 PG/ML
RBC # BLD: 5.17 M/UL (ref 4–5.2)
RBC # BLD: ABNORMAL 10*6/UL
SEG NEUTROPHILS: 71 % (ref 47–75)
SEGMENTED NEUTROPHILS ABSOLUTE COUNT: 2.9 K/UL (ref 2.5–7)
SODIUM BLD-SCNC: 134 MMOL/L (ref 135–144)
TOTAL PROTEIN: 7.2 G/DL (ref 6.4–8.3)
TROPONIN INTERP: NORMAL
TROPONIN T: NORMAL NG/ML
TROPONIN, HIGH SENSITIVITY: 10 NG/L (ref 0–14)
WBC # BLD: 4.1 K/UL (ref 3.5–11)
WBC # BLD: ABNORMAL 10*3/UL

## 2021-06-24 PROCEDURE — 96375 TX/PRO/DX INJ NEW DRUG ADDON: CPT

## 2021-06-24 PROCEDURE — 83605 ASSAY OF LACTIC ACID: CPT

## 2021-06-24 PROCEDURE — 96365 THER/PROPH/DIAG IV INF INIT: CPT

## 2021-06-24 PROCEDURE — 6370000000 HC RX 637 (ALT 250 FOR IP): Performed by: INTERNAL MEDICINE

## 2021-06-24 PROCEDURE — 80053 COMPREHEN METABOLIC PANEL: CPT

## 2021-06-24 PROCEDURE — 36415 COLL VENOUS BLD VENIPUNCTURE: CPT

## 2021-06-24 PROCEDURE — 6360000002 HC RX W HCPCS: Performed by: INTERNAL MEDICINE

## 2021-06-24 PROCEDURE — 87040 BLOOD CULTURE FOR BACTERIA: CPT

## 2021-06-24 PROCEDURE — 93005 ELECTROCARDIOGRAM TRACING: CPT | Performed by: INTERNAL MEDICINE

## 2021-06-24 PROCEDURE — 83880 ASSAY OF NATRIURETIC PEPTIDE: CPT

## 2021-06-24 PROCEDURE — 2580000003 HC RX 258: Performed by: INTERNAL MEDICINE

## 2021-06-24 PROCEDURE — 83735 ASSAY OF MAGNESIUM: CPT

## 2021-06-24 PROCEDURE — 85025 COMPLETE CBC W/AUTO DIFF WBC: CPT

## 2021-06-24 PROCEDURE — 99285 EMERGENCY DEPT VISIT HI MDM: CPT

## 2021-06-24 PROCEDURE — 94664 DEMO&/EVAL PT USE INHALER: CPT

## 2021-06-24 PROCEDURE — 71045 X-RAY EXAM CHEST 1 VIEW: CPT

## 2021-06-24 PROCEDURE — 84484 ASSAY OF TROPONIN QUANT: CPT

## 2021-06-24 RX ORDER — 0.9 % SODIUM CHLORIDE 0.9 %
1000 INTRAVENOUS SOLUTION INTRAVENOUS ONCE
Status: DISCONTINUED | OUTPATIENT
Start: 2021-06-24 | End: 2021-06-24

## 2021-06-24 RX ORDER — 0.9 % SODIUM CHLORIDE 0.9 %
500 INTRAVENOUS SOLUTION INTRAVENOUS ONCE
Status: COMPLETED | OUTPATIENT
Start: 2021-06-24 | End: 2021-06-24

## 2021-06-24 RX ORDER — ALBUTEROL SULFATE 90 UG/1
4 AEROSOL, METERED RESPIRATORY (INHALATION) ONCE
Status: COMPLETED | OUTPATIENT
Start: 2021-06-24 | End: 2021-06-24

## 2021-06-24 RX ORDER — ONDANSETRON 2 MG/ML
4 INJECTION INTRAMUSCULAR; INTRAVENOUS ONCE
Status: COMPLETED | OUTPATIENT
Start: 2021-06-24 | End: 2021-06-24

## 2021-06-24 RX ORDER — LEVOFLOXACIN 5 MG/ML
750 INJECTION, SOLUTION INTRAVENOUS EVERY 24 HOURS
Status: DISCONTINUED | OUTPATIENT
Start: 2021-06-24 | End: 2021-06-25 | Stop reason: HOSPADM

## 2021-06-24 RX ORDER — DEXAMETHASONE SODIUM PHOSPHATE 10 MG/ML
10 INJECTION, SOLUTION INTRAMUSCULAR; INTRAVENOUS ONCE
Status: COMPLETED | OUTPATIENT
Start: 2021-06-24 | End: 2021-06-24

## 2021-06-24 RX ORDER — POTASSIUM CHLORIDE 750 MG/1
20 TABLET, FILM COATED, EXTENDED RELEASE ORAL ONCE
Status: COMPLETED | OUTPATIENT
Start: 2021-06-24 | End: 2021-06-24

## 2021-06-24 RX ADMIN — SODIUM CHLORIDE 500 ML: 9 INJECTION, SOLUTION INTRAVENOUS at 16:14

## 2021-06-24 RX ADMIN — ONDANSETRON 4 MG: 2 INJECTION INTRAMUSCULAR; INTRAVENOUS at 16:13

## 2021-06-24 RX ADMIN — ALBUTEROL SULFATE 4 PUFF: 90 AEROSOL, METERED RESPIRATORY (INHALATION) at 15:43

## 2021-06-24 RX ADMIN — DEXAMETHASONE SODIUM PHOSPHATE 10 MG: 10 INJECTION, SOLUTION INTRAMUSCULAR; INTRAVENOUS at 15:30

## 2021-06-24 RX ADMIN — SODIUM CHLORIDE 1000 ML: 9 INJECTION, SOLUTION INTRAVENOUS at 15:29

## 2021-06-24 RX ADMIN — SODIUM CHLORIDE 500 ML: 9 INJECTION, SOLUTION INTRAVENOUS at 18:33

## 2021-06-24 RX ADMIN — POTASSIUM CHLORIDE 20 MEQ: 750 TABLET, FILM COATED, EXTENDED RELEASE ORAL at 16:13

## 2021-06-24 RX ADMIN — LEVOFLOXACIN 750 MG: 5 INJECTION, SOLUTION INTRAVENOUS at 15:41

## 2021-06-24 NOTE — ED NOTES
Pt placed on 2L o2 via nasal cannula. O2 saturation increased from 89% to 94%.       Bruce Lamb RN  06/24/21 8490

## 2021-06-24 NOTE — ED NOTES
Report given to Naldo Brannon RN with [facility].      Wale Hayes RN  06/24/21 6010

## 2021-06-24 NOTE — ED PROVIDER NOTES
[facility] ED  EMERGENCY DEPARTMENT ENCOUNTER      Pt Name: Parag Nelson  MRN: [id number]  [place] 1945  Date of evaluation: 6/24/2021  Provider: Rosamaria Carbajal MD    [address] [place] [address]       Chief Complaint   Patient presents with    Fatigue     pt was diagnosed with Covid four days ago and has been having loose stools. HISTORY OF PRESENT ILLNESS   (Location/Symptom, Timing/Onset, Context/Setting, Quality, Duration, Modifying Factors, Severity)  Note limiting factors. Parag Nelson is a 67 y.o. female who has a history of hypertension, hyperlipidemia, gallstones, kidney stones, cholecystectomy, appendectomy, tubal ligation, presents to the emergency department for evaluation and management of continued nausea, dry heaves, multiple episodes of watery diarrhea from excessive wiping causing perianal irritation and bleeding. She is increasingly fatigued. She is COVID-19 positive, tested by rapid COVID-19 testing 4 days ago. .  This is day 12 since her first symptom of headache which was followed by slight cough then diarrhea 1 week ago. Her  is vaccinated but she is not. She has not tried anything for symptoms. She was seen in urgent care this past weekend. HPI    Nursing Notes were reviewed. REVIEW OF SYSTEMS    (2-9 systems for level 4, 10 or more for level 5)       REVIEW OF SYSTEMS    Constitutional: Positive for fatigue and malaise, negative for fever. HENT: Negative for dental problem, ear pain and sore throat. Respiratory: Positive for dry cough, negative for chest tightness and shortness of breath. Cardiovascular: Negative for chest pain, palpitations and leg swelling. Gastrointestinal: Positive for nausea, diarrhea, negative for abdominal distention, abdominal pain and vomiting. Genitourinary: Negative for difficulty urinating, dysuria, hematuria and urgency. Musculoskeletal: Negative for arthralgias, back pain and neck pain.    Skin: Negative for color 61    High Blood Pressure Sister     High Cholesterol Sister     Cancer Brother         colon    High Blood Pressure Brother     High Cholesterol Brother           SOCIAL HISTORY       Social History     Socioeconomic History    Marital status:      Spouse name: None    Number of children: None    Years of education: None    Highest education level: None   Occupational History    None   Tobacco Use    Smoking status: Never Smoker    Smokeless tobacco: Never Used   Substance and Sexual Activity    Alcohol use: No    Drug use: No    Sexual activity: None   Other Topics Concern    None   Social History Narrative    None     Social Determinants of Health     Financial Resource Strain:     Difficulty of Paying Living Expenses:    Food Insecurity:     Worried About Running Out of Food in the Last Year:     Ran Out of Food in the Last Year:    Transportation Needs:     Lack of Transportation (Medical):      Lack of Transportation (Non-Medical):    Physical Activity:     Days of Exercise per Week:     Minutes of Exercise per Session:    Stress:     Feeling of Stress :    Social Connections:     Frequency of Communication with Friends and Family:     Frequency of Social Gatherings with Friends and Family:     Attends Taoist Services:     Active Member of Clubs or Organizations:     Attends Club or Organization Meetings:     Marital Status:    Intimate Partner Violence:     Fear of Current or Ex-Partner:     Emotionally Abused:     Physically Abused:     Sexually Abused:        SCREENINGS    Blanco Coma Scale  Eye Opening: Spontaneous  Best Verbal Response: Oriented  Best Motor Response: Obeys commands  Radha Coma Scale Score: 15        PHYSICAL EXAM    (up to 7 for level 4, 8 or more for level 5)     ED Triage Vitals   BP Temp Temp src Pulse Resp SpO2 Height Weight   -- -- -- -- -- -- -- --       Physical Exam  Physical Exam   Constitutional:  Appears well, well-developed and Temp:       TempSrc:       SpO2: 93% 93% 92% 93%   Weight:       Height:           Noted    MDM    CRITICAL CARE TIME   Total Critical Care time was 0 minutes      [facility]  ED Course as of Jun 26 0849   Thu Jun 24, 2021   1602 Albuterol made her sick to her stomach. [MS]   [phone] I requested a copy placed to transfer center to initiate transfer to [facility] for higher level of care for Covid pneumonia, respiratory failure with hypoxia, diarrhea. Patient requested [facility] and does not want to go to [facility]. [MS]   9646 I spoke with Maryan Ramos from the [facility] transfer center who took down the information and will look for a bed for this patient. [MS]   5184 No on taking home antihypertensives      [MS]   0782 ETA change from 8:45 to 9:30. [MS]      ED Course User Index  [MS] Rodrigo Kraus MD       CONSULTS:  None    PROCEDURES:  Unless otherwise noted below, none     Procedures      Summation    Ailin Jackson is a 67 y.o. female who has a history of hypertension, hyperlipidemia, gallstones, kidney stones, cholecystectomy, appendectomy, tubal ligation, presented on day #12 after start of her COVID19 symptoms with worsening symptoms due to COVID pneumonia and early pulmonary edema. was transferred to [facility] for higher level of care due to patient request. She also has symptoms of gastroenteritis with fatigue and dehydration. She received Decadron, albuterol, IV fluids for evidence of dehydration, Levaquin. She could benefit from remdesivir and monoclonal antibodies per protocol. She was offered admission to [facility] [place] and [facility] but declined. She asked to be transferred to [facility] and if no beds available to [place]. She is improved on oxygen support, with an increased need for supplemental oxygen from 2 L to 3 L.   Otherwise she is improvied, stabilized, rehydrated slowly to prevent pulmonary edema, nontoxic, hemodynamically stable, neurologically intact, and satisfactory for ground transport. Findings discussed at length with patient and family. I gave them ample opportunity to ask questions. ED Medicationsadministered this visit:    Medications   dexamethasone (PF) (DECADRON) injection 10 mg (10 mg Intravenous Given 6/24/21 1530)   albuterol sulfate  (90 Base) MCG/ACT inhaler 4 puff (4 puffs Inhalation Given 6/24/21 1543)   0.9 % sodium chloride bolus (0 mLs Intravenous Stopped 6/24/21 2159)   ondansetron (ZOFRAN) injection 4 mg (4 mg Intravenous Given 6/24/21 1613)   potassium chloride (KLOR-CON) extended release tablet 20 mEq (20 mEq Oral Given 6/24/21 1613)   0.9 % sodium chloride bolus (0 mLs Intravenous Stopped 6/24/21 2156)       New Prescriptions from this visit:    Discharge Medication List as of 6/24/2021 10:00 PM          Follow-up:  No follow-up provider specified. Final Impression:   1. Pneumonia due to COVID-19 virus    2. Hypoxia    3. Hypokalemia    4. Elevated liver enzymes    5. Gastroenteritis               (Please note that portions of this note werecompleted with a voice recognition program.  Efforts were made to edit the dictations but occasionally words are mis-transcribed.)    FINAL IMPRESSION      1. Pneumonia due to COVID-19 virus    2. Hypoxia    3. Hypokalemia    4. Elevated liver enzymes    5. Gastroenteritis          DISPOSITION/PLAN   DISPOSITION        PATIENT REFERRED TO:  No follow-up provider specified.     DISCHARGE MEDICATIONS:  Discharge Medication List as of 6/24/2021 10:00 PM             (Please note that portions of this note were completed with a voice recognition program.  Efforts were made to edit the dictations but occasionally words are mis-transcribed.)    Moises Mitchell MD (electronically signed)  Attending Emergency Physician           Moises Mitchell MD  06/26/21 0738

## 2021-06-24 NOTE — ED NOTES
Pt and  updated on room assignment at [facility] and made aware that we are setting up transport and just waiting on a ETA.       Michael Weems RN  06/24/21 1800

## 2021-06-25 LAB
EKG ATRIAL RATE: 119 BPM
EKG P AXIS: 55 DEGREES
EKG P-R INTERVAL: 176 MS
EKG Q-T INTERVAL: 328 MS
EKG QRS DURATION: 82 MS
EKG QTC CALCULATION (BAZETT): 461 MS
EKG R AXIS: 37 DEGREES
EKG T AXIS: 47 DEGREES
EKG VENTRICULAR RATE: 119 BPM

## 2021-06-25 PROCEDURE — 93010 ELECTROCARDIOGRAM REPORT: CPT | Performed by: INTERNAL MEDICINE

## 2021-06-25 NOTE — ED NOTES
Pt has O2 increased to 3 L/NC after walking to restroom and SpO2 decreased and was unable to return to normal limits.       Ole Acosta RN  06/24/21 2024

## 2021-06-30 LAB
CULTURE: NORMAL
CULTURE: NORMAL
Lab: NORMAL
Lab: NORMAL
SPECIMEN DESCRIPTION: NORMAL
SPECIMEN DESCRIPTION: NORMAL

## 2021-07-06 ENCOUNTER — TELEPHONE (OUTPATIENT)
Dept: FAMILY MEDICINE CLINIC | Age: 76
End: 2021-07-06

## 2021-07-06 NOTE — TELEPHONE ENCOUNTER
[facility] Trans[facility] Initial Follow Up Call    Outreach made within 2 business days of discharge: Yes    Patient: Christopher Aguila Patient : 1945   MRN: [id number]  Reason for Admission: Covid -19 pneumonia with hypoxia. Admitted on 21  Discharge Date: 21       Spoke with: Patient     Discharge department/facility: [place] [address] Patient Contact:  Was patient able to fill all prescriptions: Yes  Was patient instructed to bring all medications to the follow-up visit: Yes  Is patient taking all medications as directed in the discharge summary? Yes  Does patient understand their discharge instructions: Yes  Does patient have questions or concerns that need addressed prior to 7-14 day follow up office visit: no  Patient is on Oxygen per nasal cannula.     Scheduled appointment with PCP within 7-14 days    Follow Up  Future Appointments   Date Time Provider Toro Hanna   2021 10:20 AM RAGHU Candelaria - CHAU SHRESTHA [facility]   2021  9:40 AM MD Ct Duvall [address] [place], [place] [address]       Tony Mora LPN

## 2021-07-08 ENCOUNTER — OFFICE VISIT (OUTPATIENT)
Dept: FAMILY MEDICINE CLINIC | Age: 76
End: 2021-07-08
Payer: MEDICARE

## 2021-07-08 VITALS
TEMPERATURE: 98.6 F | OXYGEN SATURATION: 98 % | SYSTOLIC BLOOD PRESSURE: 138 MMHG | WEIGHT: 170 LBS | BODY MASS INDEX: 31.09 KG/M2 | HEART RATE: 78 BPM | DIASTOLIC BLOOD PRESSURE: 82 MMHG

## 2021-07-08 DIAGNOSIS — J12.82 PNEUMONIA DUE TO COVID-19 VIRUS: Primary | ICD-10-CM

## 2021-07-08 DIAGNOSIS — U07.1 PNEUMONIA DUE TO COVID-19 VIRUS: Primary | ICD-10-CM

## 2021-07-08 PROCEDURE — 1111F DSCHRG MED/CURRENT MED MERGE: CPT | Performed by: NURSE PRACTITIONER

## 2021-07-08 PROCEDURE — 99496 TRANSJ CARE MGMT HIGH F2F 7D: CPT | Performed by: NURSE PRACTITIONER

## 2021-07-08 RX ORDER — LANOLIN ALCOHOL/MO/W.PET/CERES
2 CREAM (GRAM) TOPICAL 2 TIMES DAILY
COMMUNITY
End: 2021-07-08 | Stop reason: SDUPTHER

## 2021-07-08 RX ORDER — ZINC GLUCONATE 50 MG
50 TABLET ORAL DAILY
COMMUNITY

## 2021-07-08 SDOH — ECONOMIC STABILITY: FOOD INSECURITY: WITHIN THE PAST 12 MONTHS, THE FOOD YOU BOUGHT JUST DIDN'T LAST AND YOU DIDN'T HAVE MONEY TO GET MORE.: NEVER TRUE

## 2021-07-08 SDOH — ECONOMIC STABILITY: FOOD INSECURITY: WITHIN THE PAST 12 MONTHS, YOU WORRIED THAT YOUR FOOD WOULD RUN OUT BEFORE YOU GOT MONEY TO BUY MORE.: NEVER TRUE

## 2021-07-08 ASSESSMENT — SOCIAL DETERMINANTS OF HEALTH (SDOH): HOW HARD IS IT FOR YOU TO PAY FOR THE VERY BASICS LIKE FOOD, HOUSING, MEDICAL CARE, AND HEATING?: NOT HARD AT ALL

## 2021-07-08 NOTE — PROGRESS NOTES
Post-Discharge Transitional Care Management Services or Hospital Follow Up      [address] [facility]   YOB: 1945    Date of Office Visit:  7/8/2021  Date of Hospital Admission: 6/24/21  Date of Hospital Discharge: 7/1/21  Risk of hospital readmission (high >=14%. Medium >=10%) :No data recorded    Care management risk score Rising risk (score 2-5) and Complex Care (Scores >=6): 0     Non face to face  following discharge, date last encounter closed (first attempt may have been earlier): 7/6/2021  2:11 PM    Call initiated 2 business days of discharge: Yes    Patient Active Problem List   Diagnosis    Essential hypertension, benign    Pure hypercholesterolemia    Asymptomatic gallstones    Kidney stone on left side    S/P laparoscopic cholecystectomy       No Known Allergies    Medications listed as ordered at the time of discharge from hospital   Eva Alexandra   Home Medication Instructions JANET:    Printed on:07/08/21 8842   Medication Information                      amLODIPine (NORVASC) 5 MG tablet  TAKE 1 TABLET BY MOUTH EVERY DAY             ascorbic acid (VITAMIN C) 500 MG tablet  Take 500 mg by mouth daily. Calcium-Vitamin D (CALTRATE 600 PLUS-VIT D PO)  Take 2 tablets by mouth daily. hydroCHLOROthiazide (HYDRODIURIL) 25 MG tablet  TAKE 1 TABLET BY MOUTH EVERY DAY             Misc Natural Products (GLUCOSAMINE CHONDROITIN ADV) TABS  Take 1 tablet by mouth 2 times daily. Glucosamine 1500mg / Chondroitin 1200mg             pravastatin (PRAVACHOL) 20 MG tablet  TAKE 1 TABLET BY MOUTH EVERY DAY             vitamin D (CHOLECALCIFEROL) 1000 UNIT TABS tablet  Take 1,000 Units by mouth daily             vitamin E 400 UNIT capsule  Take 400 Units by mouth daily.              zinc gluconate 50 MG tablet  Take 50 mg by mouth daily                   Medications marked \"taking\" at this time  Outpatient Medications Marked as Taking for the 7/8/21 encounter (Office Visit) with Judy Garcia RAGHU Agudelo - CNP   Medication Sig Dispense Refill    zinc gluconate 50 MG tablet Take 50 mg by mouth daily      pravastatin (PRAVACHOL) 20 MG tablet TAKE 1 TABLET BY MOUTH EVERY DAY 30 tablet 5    amLODIPine (NORVASC) 5 MG tablet TAKE 1 TABLET BY MOUTH EVERY DAY 30 tablet 5    hydroCHLOROthiazide (HYDRODIURIL) 25 MG tablet TAKE 1 TABLET BY MOUTH EVERY DAY 30 tablet 5    vitamin D (CHOLECALCIFEROL) 1000 UNIT TABS tablet Take 1,000 Units by mouth daily      Misc Natural Products (GLUCOSAMINE CHONDROITIN ADV) TABS Take 1 tablet by mouth 2 times daily. Glucosamine 1500mg / Chondroitin 1200mg      Calcium-Vitamin D (CALTRATE 600 PLUS-VIT D PO) Take 2 tablets by mouth daily.  ascorbic acid (VITAMIN C) 500 MG tablet Take 500 mg by mouth daily.  vitamin E 400 UNIT capsule Take 400 Units by mouth daily. Medications patient taking as of now reconciled against medications ordered at time of hospital discharge: Yes    Chief Complaint   Patient presents with    Follow-Up from Hospital     Patient here today for hospital follow up. Was admitted on 6/24/21 and discharged 7/1/21 from [facility]. Admitted for covid pneumonia. Patient would like to have her O2 discontinued. History of Present illness - Follow up of Hospital diagnosis(es): Viviana Tatum is a 67 y.o. female who has a history of hypertension, hyperlipidemia, gallstones, kidney stones, cholecystectomy, appendectomy, tubal ligation, presented to the [facility] emergency department for evaluation and management of continued nausea, dry heaves, multiple episodes of watery diarrhea. She was increasingly fatigued and SOB     She is COVID-19 positive, tested by rapid COVID-19 testing at the walk in. Pt was admitted to [facility] after being transferred from [facility]. While in the hospital she received IV fluids and oxygen therapy. It was determined that she would be sent home on oxygen after her oxygen dropped during ambulation. Today pt states that she feels good. Has a little trouble sleeping due to some shoulder pain. Is still wearing home oxygen at 2L around the clock. Pt takes O2 off to shower. Showering the first time at home she got a little winded and weak. She put the oxygen on and felt better. Last night's shower she did not feel weak or SOB. Overall, pt is feeling much improved. Inpatient course: Discharge summary reviewed- see chart. Interval history/Current status:     A comprehensive review of systems was negative except for what was noted in the HPI. Vitals:    07/08/21 0914   BP: 138/82   Pulse: 78   Temp: 98.6 °F (37 °C)   TempSrc: Oral   SpO2: 98%   Weight: 170 lb (77.1 kg)     Body mass index is 31.09 kg/m².    Wt Readings from Last 3 Encounters:   07/08/21 170 lb (77.1 kg)   06/24/21 175 lb (79.4 kg)   03/02/21 174 lb (78.9 kg)     BP Readings from Last 3 Encounters:   07/08/21 138/82   06/24/21 121/68   03/02/21 (!) 138/90        Physical Exam:  General Appearance: alert and oriented to person, place and time, well developed and well- nourished, in no acute distress  Skin: warm and dry, no rash or erythema  Head: normocephalic and atraumatic  Eyes: pupils equal, round, and reactive to light, extraocular eye movements intact, conjunctivae normal  ENT: tympanic membrane, external ear and ear canal normal bilaterally, nose without deformity, nasal mucosa and turbinates normal without polyps  Neck: supple and non-tender without mass, no thyromegaly or thyroid nodules, no cervical lymphadenopathy  Pulmonary/Chest: clear to auscultation bilaterally- no wheezes, rales or rhonchi, normal air movement, no respiratory distress  Cardiovascular: normal rate, regular rhythm, normal S1 and S2, no murmurs, rubs, clicks, or gallops, distal pulses intact, no carotid bruits  Abdomen: soft, non-tender, non-distended, normal bowel sounds, no masses or organomegaly  Extremities: no cyanosis, clubbing or edema  Musculoskeletal: normal range of motion, no joint swelling, deformity or tenderness  Neurologic: reflexes normal and symmetric, no cranial nerve deficit, gait, coordination and speech normal    Assessment/Plan:  1. Pneumonia due to COVID-19 virus  ---pt was ambulated approx 200' without oxygen. SpO2 decreased to 90% with ambulation. Pt will keep oxygen on hand at home, but use prn. Pt advised to keep activity light and not for extended periods of time.  Will see pt again in 1 week and do another ambulating SpO2.    - GA DISCHARGE MEDS RECONCILED W/ CURRENT OUTPATIENT MED LIST        Medical Decision Making: high complexity

## 2021-07-08 NOTE — PATIENT INSTRUCTIONS
SURVEY:    You may be receiving a survey from [employer] regarding your visit today. Please complete the survey to enable us to provide the highest quality of care to you and your family. If you cannot score us a very good (5 Stars) on any question, please call the office to discuss how we could have made your experience a very good one. Thank you.     Clinical Care Team: RAGHU Doherty-CHAU Davison LPN    Clerical Team: Abdullahi Potter

## 2021-07-15 ENCOUNTER — OFFICE VISIT (OUTPATIENT)
Dept: FAMILY MEDICINE CLINIC | Age: 76
End: 2021-07-15
Payer: MEDICARE

## 2021-07-15 ENCOUNTER — HOSPITAL ENCOUNTER (OUTPATIENT)
Dept: NON INVASIVE DIAGNOSTICS | Age: 76
Discharge: HOME | End: 2021-07-15
Payer: MEDICARE

## 2021-07-15 VITALS
BODY MASS INDEX: 31.28 KG/M2 | WEIGHT: 171 LBS | HEART RATE: 108 BPM | DIASTOLIC BLOOD PRESSURE: 88 MMHG | OXYGEN SATURATION: 95 % | SYSTOLIC BLOOD PRESSURE: 138 MMHG

## 2021-07-15 DIAGNOSIS — R00.0 TACHYCARDIA: ICD-10-CM

## 2021-07-15 DIAGNOSIS — J12.82 PNEUMONIA DUE TO COVID-19 VIRUS: Primary | ICD-10-CM

## 2021-07-15 DIAGNOSIS — U07.1 PNEUMONIA DUE TO COVID-19 VIRUS: Primary | ICD-10-CM

## 2021-07-15 PROCEDURE — G8417 CALC BMI ABV UP PARAM F/U: HCPCS | Performed by: FAMILY MEDICINE

## 2021-07-15 PROCEDURE — G8400 PT W/DXA NO RESULTS DOC: HCPCS | Performed by: FAMILY MEDICINE

## 2021-07-15 PROCEDURE — 1090F PRES/ABSN URINE INCON ASSESS: CPT | Performed by: FAMILY MEDICINE

## 2021-07-15 PROCEDURE — 1123F ACP DISCUSS/DSCN MKR DOCD: CPT | Performed by: FAMILY MEDICINE

## 2021-07-15 PROCEDURE — 99214 OFFICE O/P EST MOD 30 MIN: CPT | Performed by: FAMILY MEDICINE

## 2021-07-15 PROCEDURE — 4040F PNEUMOC VAC/ADMIN/RCVD: CPT | Performed by: FAMILY MEDICINE

## 2021-07-15 PROCEDURE — 1036F TOBACCO NON-USER: CPT | Performed by: FAMILY MEDICINE

## 2021-07-15 PROCEDURE — G8427 DOCREV CUR MEDS BY ELIG CLIN: HCPCS | Performed by: FAMILY MEDICINE

## 2021-07-15 PROCEDURE — 93270 REMOTE 30 DAY ECG REV/REPORT: CPT

## 2021-07-15 ASSESSMENT — ENCOUNTER SYMPTOMS
DIARRHEA: 0
VOMITING: 0
COUGH: 1
NAUSEA: 0
BLOOD IN STOOL: 0
EYE DISCHARGE: 0
EYE REDNESS: 0
ABDOMINAL PAIN: 0
SHORTNESS OF BREATH: 0

## 2021-07-15 NOTE — PATIENT INSTRUCTIONS
SURVEY:    You may be receiving a survey from [employer] regarding your visit today. Please complete the survey to enable us to provide the highest quality of care to you and your family. If you cannot score us a very good on any question, please call the office to discuss how we could of made your experience a very good one. Thank you.

## 2021-07-15 NOTE — PROGRESS NOTES
HPI Notes    Name: Thirza Bosworth  : 1945        Chief Complaint:     Chief Complaint   Patient presents with    Post-COVID Symptoms     Patient is here for 1 week follow up to Covid Pneumonia. Patient states she is doing better and she stopped the Oxygen herself. History of Present Illness:     Thirza Bosworth is a 67 y.o.  female who presents with Post-COVID Symptoms (Patient is here for 1 week follow up to Covid Pneumonia. Patient states she is doing better and she stopped the Oxygen herself.)      HPI  COVID pneumonia - Pt here for follow up from the COVID pneumonia. Since last OV pt has stopped the oxygen about 36hrs ago. Pt NO longer feels SOB. Pt still has some cough left but better. NO F/C since the first few days in hospital. Pt can taste and smell but they are a little off. Pt also had diarrhea but that has all resolved. Pt has good appetite but still a little tired. Pt had exposure from her [employer] back around  and tested positive on  at a [facility]. Pt then got more SOB and went to [facility] ER on  and admitted to [facility] for several days. Pt never intubated. Pt overall better but still has a racing heart rate. No chest pain. Tachycardia -  Pt has been having off and on racing heart rate since she has been at home. Pt has a pulse ox and it check pulse too. Pt states her pulse runs in the 100s. No Chest pain or SOB. No dizziness. Pt is on no new medication.     Past Medical History:     Past Medical History:   Diagnosis Date    Hyperlipidemia     Hypertension     Kidney stone       Reviewed all health maintenance requirements and ordered appropriate tests  Health Maintenance Due   Topic Date Due    Hepatitis C screen  Never done    COVID-19 Vaccine (1) Never done    DTaP/Tdap/Td vaccine (1 - Tdap) Never done    Shingles Vaccine (1 of 2) Never done    DEXA (modify frequency per FRAX score)  Never done       Past Surgical History: Past Surgical History:   Procedure Laterality Date    APPENDECTOMY      CHOLECYSTECTOMY  10/09/14    laparoscopy    COLONOSCOPY  2009    wnl    TUBAL LIGATION          Medications:       Prior to Admission medications    Medication Sig Start Date End Date Taking? Authorizing Provider   zinc gluconate 50 MG tablet Take 50 mg by mouth daily   Yes Historical Provider, MD   pravastatin (PRAVACHOL) 20 MG tablet TAKE 1 TABLET BY MOUTH EVERY DAY 2/2/21  Yes Monika Guerra MD   amLODIPine (NORVASC) 5 MG tablet TAKE 1 TABLET BY MOUTH EVERY DAY 2/2/21  Yes Monika Guerra MD   hydroCHLOROthiazide (HYDRODIURIL) 25 MG tablet TAKE 1 TABLET BY MOUTH EVERY DAY 2/2/21  Yes Monika Guerra MD   vitamin D (CHOLECALCIFEROL) 1000 UNIT TABS tablet Take 1,000 Units by mouth daily   Yes Historical Provider, MD   Misc Natural Products (GLUCOSAMINE CHONDROITIN ADV) TABS Take 1 tablet by mouth 2 times daily. Glucosamine 1500mg / Chondroitin 1200mg   Yes Historical Provider, MD   Calcium-Vitamin D (CALTRATE 600 PLUS-VIT D PO) Take 2 tablets by mouth daily. Yes Historical Provider, MD   ascorbic acid (VITAMIN C) 500 MG tablet Take 500 mg by mouth daily. Yes Historical Provider, MD   vitamin E 400 UNIT capsule Take 400 Units by mouth daily. Yes Historical Provider, MD        Allergies:       Patient has no known allergies. Social History:     Tobacco:    reports that she has never smoked. She has never used smokeless tobacco.  Alcohol:      reports no history of alcohol use. Drug Use:  reports no history of drug use.     Family History:     Family History   Problem Relation Age of Onset    High Blood Pressure Mother     Heart Disease Father 61    High Blood Pressure Sister     High Cholesterol Sister     Cancer Brother         colon    High Blood Pressure Brother     High Cholesterol Brother        Review of Systems:       Review of Systems   Constitutional: Negative for chills, fatigue, fever and unexpected weight change. Eyes: Negative for discharge, redness and visual disturbance. Respiratory: Positive for cough. Negative for shortness of breath. Cardiovascular: Positive for palpitations. Negative for chest pain and leg swelling. Gastrointestinal: Negative for abdominal pain, blood in stool, diarrhea, nausea and vomiting. Genitourinary: Negative for dysuria and hematuria. Musculoskeletal: Negative for arthralgias and joint swelling. Skin: Negative for rash. Neurological: Negative for dizziness and facial asymmetry. Psychiatric/Behavioral: Negative for self-injury and sleep disturbance. Physical Exam:     Physical Exam  Vitals reviewed. Constitutional:       General: She is not in acute distress. Appearance: Normal appearance. She is well-developed. She is not ill-appearing. HENT:      Head: Normocephalic and atraumatic. Eyes:      General:         Right eye: No discharge. Left eye: No discharge. Conjunctiva/sclera: Conjunctivae normal.      Pupils: Pupils are equal, round, and reactive to light. Neck:      Thyroid: No thyromegaly. Cardiovascular:      Rate and Rhythm: Normal rate and regular rhythm. Heart sounds: Normal heart sounds. No murmur heard. Pulmonary:      Effort: Pulmonary effort is normal. No respiratory distress. Breath sounds: Normal breath sounds. No wheezing or rhonchi. Abdominal:      General: Bowel sounds are normal.      Palpations: Abdomen is soft. Tenderness: There is no abdominal tenderness. Musculoskeletal:      Cervical back: Neck supple. Right lower leg: No edema. Left lower leg: No edema. Lymphadenopathy:      Cervical: No cervical adenopathy. Skin:     Findings: No erythema or rash. Neurological:      General: No focal deficit present. Mental Status: She is alert and oriented to person, place, and time.    Psychiatric:         Mood and Affect: Mood normal.         Behavior: Behavior normal. Vitals:  /88   Pulse 108   Wt 171 lb (77.6 kg)   SpO2 95%   BMI 31.28 kg/m²       Data:     Lab Results   Component Value Date     06/24/2021    K 3.0 06/24/2021    CL 92 06/24/2021    CO2 31 06/24/2021    BUN 10 06/24/2021    CREATININE 0.65 06/24/2021    GLUCOSE 151 06/24/2021    GLUCOSE 102 03/02/2012    PROT 7.2 06/24/2021    LABALBU 3.7 06/24/2021    LABALBU 4.6 03/02/2012    BILITOT 0.80 06/24/2021    ALKPHOS 57 06/24/2021    AST 77 06/24/2021    ALT 37 06/24/2021     Lab Results   Component Value Date    WBC 4.1 06/24/2021    RBC 5.17 06/24/2021    HGB 15.4 06/24/2021    HCT 45.0 06/24/2021    MCV 87.1 06/24/2021    MCH 29.8 06/24/2021    MCHC 34.2 06/24/2021    RDW 12.9 06/24/2021     06/24/2021    MPV NOT REPORTED 06/24/2021     No results found for: TSH  Lab Results   Component Value Date    CHOL 129 02/25/2021    HDL 49 02/25/2021    LABA1C 5.7 08/25/2020          Assessment/Plan:        1. Pneumonia due to COVID-19 virus  Resolved and pt for another couple weeks ck pulse ox 3 times per day and do incentive spirometry 3-4 times per day then wean off. Fluids and rest as needed. 2. Tachycardia  D/w pt may be related to COVID and pt is fairly asymptomatic but suggest 2wk event monitor.  - Cardiac event monitor; Future    FF. >25min talking about symptoms and answering questions. Return if symptoms worsen or fail to improve, for keep appt in August for ckup .       Electronically signed by Yodit Arana MD on 7/15/2021 at 6:35 PM

## 2021-07-26 ENCOUNTER — TELEPHONE (OUTPATIENT)
Dept: CARDIOLOGY CLINIC | Age: 76
End: 2021-07-26

## 2021-07-26 DIAGNOSIS — I10 ESSENTIAL HYPERTENSION, BENIGN: ICD-10-CM

## 2021-07-26 RX ORDER — DILTIAZEM HYDROCHLORIDE 180 MG/1
180 CAPSULE, COATED, EXTENDED RELEASE ORAL DAILY
Qty: 30 CAPSULE | Refills: 11 | Status: SHIPPED | OUTPATIENT
Start: 2021-07-26 | End: 2021-10-27 | Stop reason: ALTCHOICE

## 2021-07-26 NOTE — TELEPHONE ENCOUNTER
LMOM for pt to call office regarding event monitor.     Pt returned call event monitor strip tachycardia   Per Dr Nadia Shaffer to stop norvasc and   Start diltiazem 180 mg daily

## 2021-07-28 ENCOUNTER — TELEPHONE (OUTPATIENT)
Dept: FAMILY MEDICINE CLINIC | Age: 76
End: 2021-07-28

## 2021-07-28 NOTE — TELEPHONE ENCOUNTER
[facility] called in and wanted to inform Mare Bell that Dr. Nadia Shaffer discontinued the amlodipine. She said she didn't think about this when she spoke with Mare Bell, but he discontinued this on 7/26/21. He send in a year prescription of diltiazem.     Health Maintenance   Topic Date Due    Hepatitis C screen  Never done    COVID-19 Vaccine (1) Never done    DTaP/Tdap/Td vaccine (1 - Tdap) Never done    Shingles Vaccine (1 of 2) Never done    DEXA (modify frequency per FRAX score)  Never done    Flu vaccine (1) 09/01/2021    Lipid screen  02/25/2022    Annual Wellness Visit (AWV)  03/03/2022    Potassium monitoring  06/24/2022    Creatinine monitoring  06/24/2022    Pneumococcal 65+ years Vaccine  Completed    Hepatitis A vaccine  Aged Out    Hepatitis B vaccine  Aged Out    Hib vaccine  Aged Out    Meningococcal (ACWY) vaccine  Aged Out             (applicable per patient's age: Cancer Screenings, Depression Screening, Fall Risk Screening, Immunizations)    Hemoglobin A1C (%)   Date Value   08/25/2020 5.7   08/27/2019 5.5     LDL Cholesterol (mg/dL)   Date Value   02/25/2021 65     AST (U/L)   Date Value   06/24/2021 77 (H)     ALT (U/L)   Date Value   06/24/2021 37 (H)     BUN (mg/dL)   Date Value   06/24/2021 10      (goal A1C is < 7)   (goal LDL is <100) need 30-50% reduction from baseline     BP Readings from Last 3 Encounters:   07/15/21 138/88   07/08/21 138/82   06/24/21 121/68    (goal /80)      All Future Testing planned in [facility]:  Lab Frequency Next Occurrence   Lipid Panel Once 09/02/2021   Hemoglobin A1C Once 09/02/2021   Comprehensive Metabolic Panel Once 01/48/1422       Next Visit Date:  Future Appointments   Date Time Provider Toro Hanna   8/31/2021  9:40 AM Alejandro Ronquillo MD [facility]            Patient Active Problem List:     Essential hypertension, benign     Pure hypercholesterolemia     Asymptomatic gallstones     Kidney stone on left side     S/P laparoscopic cholecystectomy

## 2021-08-25 ENCOUNTER — HOSPITAL ENCOUNTER (OUTPATIENT)
Age: 76
Discharge: HOME | End: 2021-08-25
Payer: MEDICARE

## 2021-08-25 DIAGNOSIS — R73.9 HYPERGLYCEMIA: ICD-10-CM

## 2021-08-25 DIAGNOSIS — I10 ESSENTIAL HYPERTENSION, BENIGN: ICD-10-CM

## 2021-08-25 DIAGNOSIS — E78.00 PURE HYPERCHOLESTEROLEMIA: ICD-10-CM

## 2021-08-25 LAB
ALBUMIN SERPL-MCNC: 4 G/DL (ref 3.5–5.2)
ALBUMIN/GLOBULIN RATIO: ABNORMAL (ref 1–2.5)
ALP BLD-CCNC: 62 U/L (ref 35–104)
ALT SERPL-CCNC: 12 U/L (ref 5–33)
ANION GAP SERPL CALCULATED.3IONS-SCNC: 7 MMOL/L (ref 9–17)
AST SERPL-CCNC: 22 U/L
BILIRUB SERPL-MCNC: 1.41 MG/DL (ref 0.3–1.2)
BUN BLDV-MCNC: 11 MG/DL (ref 8–23)
BUN/CREAT BLD: 17 (ref 9–20)
CALCIUM SERPL-MCNC: 9.5 MG/DL (ref 8.6–10.4)
CHLORIDE BLD-SCNC: 104 MMOL/L (ref 98–107)
CHOLESTEROL/HDL RATIO: 3.3
CHOLESTEROL: 137 MG/DL
CO2: 31 MMOL/L (ref 20–31)
CREAT SERPL-MCNC: 0.65 MG/DL (ref 0.5–0.9)
ESTIMATED AVERAGE GLUCOSE: 91 MG/DL
GFR AFRICAN AMERICAN: >60 ML/MIN
GFR NON-AFRICAN AMERICAN: >60 ML/MIN
GFR SERPL CREATININE-BSD FRML MDRD: ABNORMAL ML/MIN/{1.73_M2}
GFR SERPL CREATININE-BSD FRML MDRD: ABNORMAL ML/MIN/{1.73_M2}
GLUCOSE BLD-MCNC: 112 MG/DL (ref 70–99)
HBA1C MFR BLD: 4.8 % (ref 4–6)
HDLC SERPL-MCNC: 42 MG/DL
LDL CHOLESTEROL: 75 MG/DL (ref 0–130)
PATIENT FASTING?: YES
POTASSIUM SERPL-SCNC: 3.8 MMOL/L (ref 3.7–5.3)
SODIUM BLD-SCNC: 142 MMOL/L (ref 135–144)
TOTAL PROTEIN: 7.2 G/DL (ref 6.4–8.3)
TRIGL SERPL-MCNC: 101 MG/DL
VLDLC SERPL CALC-MCNC: NORMAL MG/DL (ref 1–30)

## 2021-08-25 PROCEDURE — 80053 COMPREHEN METABOLIC PANEL: CPT

## 2021-08-25 PROCEDURE — 80061 LIPID PANEL: CPT

## 2021-08-25 PROCEDURE — 36415 COLL VENOUS BLD VENIPUNCTURE: CPT

## 2021-08-25 PROCEDURE — 83036 HEMOGLOBIN GLYCOSYLATED A1C: CPT

## 2021-08-31 ENCOUNTER — OFFICE VISIT (OUTPATIENT)
Dept: FAMILY MEDICINE CLINIC | Age: 76
End: 2021-08-31
Payer: MEDICARE

## 2021-08-31 VITALS
BODY MASS INDEX: 31.09 KG/M2 | OXYGEN SATURATION: 96 % | DIASTOLIC BLOOD PRESSURE: 80 MMHG | SYSTOLIC BLOOD PRESSURE: 158 MMHG | HEART RATE: 104 BPM | WEIGHT: 170 LBS

## 2021-08-31 DIAGNOSIS — E78.00 PURE HYPERCHOLESTEROLEMIA: ICD-10-CM

## 2021-08-31 DIAGNOSIS — I10 ESSENTIAL HYPERTENSION, BENIGN: Primary | ICD-10-CM

## 2021-08-31 DIAGNOSIS — R73.9 HYPERGLYCEMIA: ICD-10-CM

## 2021-08-31 DIAGNOSIS — R00.0 TACHYCARDIA: ICD-10-CM

## 2021-08-31 PROCEDURE — 99214 OFFICE O/P EST MOD 30 MIN: CPT | Performed by: FAMILY MEDICINE

## 2021-08-31 PROCEDURE — G8417 CALC BMI ABV UP PARAM F/U: HCPCS | Performed by: FAMILY MEDICINE

## 2021-08-31 PROCEDURE — 4040F PNEUMOC VAC/ADMIN/RCVD: CPT | Performed by: FAMILY MEDICINE

## 2021-08-31 PROCEDURE — 1090F PRES/ABSN URINE INCON ASSESS: CPT | Performed by: FAMILY MEDICINE

## 2021-08-31 PROCEDURE — G8427 DOCREV CUR MEDS BY ELIG CLIN: HCPCS | Performed by: FAMILY MEDICINE

## 2021-08-31 PROCEDURE — 1123F ACP DISCUSS/DSCN MKR DOCD: CPT | Performed by: FAMILY MEDICINE

## 2021-08-31 PROCEDURE — 1036F TOBACCO NON-USER: CPT | Performed by: FAMILY MEDICINE

## 2021-08-31 PROCEDURE — G8400 PT W/DXA NO RESULTS DOC: HCPCS | Performed by: FAMILY MEDICINE

## 2021-08-31 RX ORDER — HYDROCHLOROTHIAZIDE 25 MG/1
TABLET ORAL
Qty: 30 TABLET | Refills: 5 | Status: SHIPPED | OUTPATIENT
Start: 2021-08-31 | End: 2022-02-11

## 2021-08-31 ASSESSMENT — ENCOUNTER SYMPTOMS
NAUSEA: 0
BLOOD IN STOOL: 0
EYE DISCHARGE: 0
ABDOMINAL PAIN: 0
CONSTIPATION: 0
VOMITING: 0
DIARRHEA: 0
EYE REDNESS: 0
COUGH: 0
SHORTNESS OF BREATH: 0

## 2021-08-31 NOTE — PROGRESS NOTES
HPI Notes    Name: Leslie Lennon  : 1945        Chief Complaint:     Chief Complaint   Patient presents with    Hyperlipidemia    Hypertension    Hyperglycemia     21  HgA1C - 4.8       History of Present Illness:     Leslie Lennon is a 67 y.o.  female who presents with Hyperlipidemia, Hypertension, and Hyperglycemia (21  HgA1C - 4.8)      Hyperlipidemia  This is a chronic problem. The current episode started more than 1 year ago. The problem is controlled. Recent lipid tests were reviewed and are normal. She has no history of diabetes or hypothyroidism. There are no known factors aggravating her hyperlipidemia. Pertinent negatives include no chest pain or shortness of breath. Current antihyperlipidemic treatment includes statins. The current treatment provides significant improvement of lipids. Risk factors for coronary artery disease include dyslipidemia and hypertension. Hypertension  This is a chronic problem. The current episode started more than 1 year ago. The problem is unchanged. The problem is controlled (home BP is all 110-130/70-80s). Pertinent negatives include no chest pain, headaches, malaise/fatigue, neck pain, palpitations, peripheral edema or shortness of breath. There are no associated agents to hypertension. Risk factors for coronary artery disease include dyslipidemia and post-menopausal state. The current treatment provides significant improvement. Hyperglycemia  This is a chronic problem. The current episode started more than 1 year ago. The problem has been unchanged. Pertinent negatives include no abdominal pain, chest pain, chills, coughing, fatigue, fever, headaches, joint swelling, nausea, neck pain, rash or vomiting. HgbA1c at 4.8 on 21 so no diabetes. Tachycardia - stable and feeling better off the norvasc and on the cardizem . Pt would like to see Dr Ivonne Howe for follow up. No chest pain or SOB.     Past Medical History:     Past Medical History:   Diagnosis Date    Hyperlipidemia     Hypertension     Kidney stone       Reviewed all health maintenance requirements and ordered appropriate tests  Health Maintenance Due   Topic Date Due    Hepatitis C screen  Never done    COVID-19 Vaccine (1) Never done    DTaP/Tdap/Td vaccine (1 - Tdap) Never done    Shingles Vaccine (1 of 2) Never done       Past Surgical History:     Past Surgical History:   Procedure Laterality Date    APPENDECTOMY      CHOLECYSTECTOMY  10/09/14    laparoscopy    COLONOSCOPY  2009    wnl    TUBAL LIGATION          Medications:       Prior to Admission medications    Medication Sig Start Date End Date Taking? Authorizing Provider   hydroCHLOROthiazide (HYDRODIURIL) 25 MG tablet TAKE 1 TABLET BY MOUTH EVERY DAY 8/31/21  Yes Balta Alfaro MD   pravastatin (PRAVACHOL) 20 MG tablet TAKE 1 TABLET BY MOUTH EVERY DAY 7/27/21  Yes RAGHU Valentine CNP   dilTIAZem (CARDIZEM CD) 180 MG extended release capsule Take 1 capsule by mouth daily 7/26/21  Yes Leatha Sands MD   zinc gluconate 50 MG tablet Take 50 mg by mouth daily   Yes Historical Provider, MD   vitamin D (CHOLECALCIFEROL) 1000 UNIT TABS tablet Take 1,000 Units by mouth daily   Yes Historical Provider, MD   Misc Natural Products (GLUCOSAMINE CHONDROITIN ADV) TABS Take 1 tablet by mouth 2 times daily. Glucosamine 1500mg / Chondroitin 1200mg   Yes Historical Provider, MD   Calcium-Vitamin D (CALTRATE 600 PLUS-VIT D PO) Take 2 tablets by mouth daily. Yes Historical Provider, MD   ascorbic acid (VITAMIN C) 500 MG tablet Take 500 mg by mouth daily. Yes Historical Provider, MD   vitamin E 400 UNIT capsule Take 400 Units by mouth daily. Yes Historical Provider, MD        Allergies:       Patient has no known allergies. Social History:     Tobacco:    reports that she has never smoked. She has never used smokeless tobacco.  Alcohol:      reports no history of alcohol use.   Drug Use:  reports no history of drug use.    Family History:     Family History   Problem Relation Age of Onset    High Blood Pressure Mother     Heart Disease Father 61    High Blood Pressure Sister     High Cholesterol Sister     Cancer Brother         colon    High Blood Pressure Brother     High Cholesterol Brother        Review of Systems:       Review of Systems   Constitutional: Negative for chills, fatigue, fever, malaise/fatigue and unexpected weight change. Eyes: Negative for discharge, redness and visual disturbance. Respiratory: Negative for cough and shortness of breath. Cardiovascular: Negative for chest pain and palpitations. Gastrointestinal: Negative for abdominal pain, blood in stool, constipation, diarrhea, nausea and vomiting. Genitourinary: Negative for dysuria and hematuria. Musculoskeletal: Negative for joint swelling and neck pain. Skin: Negative for rash. Neurological: Negative for dizziness, tremors, light-headedness and headaches. Psychiatric/Behavioral: Negative for sleep disturbance. Physical Exam:     Physical Exam  Vitals reviewed. Constitutional:       General: She is not in acute distress. Appearance: Normal appearance. She is well-developed. She is not ill-appearing. HENT:      Head: Normocephalic and atraumatic. Eyes:      General:         Right eye: No discharge. Left eye: No discharge. Conjunctiva/sclera: Conjunctivae normal.      Pupils: Pupils are equal, round, and reactive to light. Neck:      Thyroid: No thyromegaly. Vascular: No carotid bruit. Cardiovascular:      Rate and Rhythm: Normal rate and regular rhythm. Heart sounds: Normal heart sounds. No murmur heard. Pulmonary:      Effort: Pulmonary effort is normal.      Breath sounds: Normal breath sounds. Abdominal:      General: Bowel sounds are normal. There is no distension. Palpations: Abdomen is soft. Tenderness: There is no abdominal tenderness.    Musculoskeletal: Cervical back: Neck supple. Right lower leg: No edema. Left lower leg: No edema. Lymphadenopathy:      Cervical: No cervical adenopathy. Skin:     Findings: No erythema or rash. Neurological:      General: No focal deficit present. Mental Status: She is alert and oriented to person, place, and time. Psychiatric:         Mood and Affect: Mood normal.         Behavior: Behavior normal.         Vitals:  BP (!) 158/80   Pulse 104   Wt 170 lb (77.1 kg)   SpO2 96%   BMI 31.09 kg/m²       Data:     Lab Results   Component Value Date     08/25/2021    K 3.8 08/25/2021     08/25/2021    CO2 31 08/25/2021    BUN 11 08/25/2021    CREATININE 0.65 08/25/2021    GLUCOSE 112 08/25/2021    GLUCOSE 102 03/02/2012    PROT 7.2 08/25/2021    LABALBU 4.0 08/25/2021    LABALBU 4.6 03/02/2012    BILITOT 1.41 08/25/2021    ALKPHOS 62 08/25/2021    AST 22 08/25/2021    ALT 12 08/25/2021     Lab Results   Component Value Date    WBC 4.1 06/24/2021    RBC 5.17 06/24/2021    HGB 15.4 06/24/2021    HCT 45.0 06/24/2021    MCV 87.1 06/24/2021    MCH 29.8 06/24/2021    MCHC 34.2 06/24/2021    RDW 12.9 06/24/2021     06/24/2021    MPV NOT REPORTED 06/24/2021     No results found for: TSH  Lab Results   Component Value Date    CHOL 137 08/25/2021    HDL 42 08/25/2021    LABA1C 4.8 08/25/2021          Assessment/Plan:        1. Essential hypertension, benign  Pt to stay on the HCTZ and ck home BP as her home BP are all good  - hydroCHLOROthiazide (HYDRODIURIL) 25 MG tablet; TAKE 1 TABLET BY MOUTH EVERY DAY  Dispense: 30 tablet; Refill: 5    2. Pure hypercholesterolemia  Stable on pravachol     3. Hyperglycemia  Stable and Hgba1c 4.8    4. Tachycardia   Stable on the cardizem      Eva received counseling on the following healthy behaviors: nutrition and exercise  Reviewed prior labs and health maintenance  Continue current medications, diet and exercise.   Discussed use, benefit, and side effects of prescribed medications. Barriers to medication compliance addressed. Patient given educational materials - see patient instructions  Was a self-tracking handout given in paper form or via [employer]? Yes    Requested Prescriptions     Signed Prescriptions Disp Refills    hydroCHLOROthiazide (HYDRODIURIL) 25 MG tablet 30 tablet 5     Sig: TAKE 1 TABLET BY MOUTH EVERY DAY       All patient questions answered. Patient voiced understanding. Quality Measures    Body mass index is 31.09 kg/m². Elevated. Weight control planned discussed Healthy diet and regular exercise. BP: (!) 158/80. Blood pressure is high. Treatment plan consists of Patient In-home Blood Pressure Monitoring and No treatment change needed. Fall Risk 3/2/2021 9/3/2019 3/6/2018 9/6/2016 9/1/2015 9/3/2014   2 or more falls in past year? no yes no no no no   Fall with injury in past year? no no no no no no     The patient does not have a history of falls. I did not - not indicated , complete a risk assessment for falls. A plan of care for falls No Treatment plan indicated    Lab Results   Component Value Date    LDLCHOLESTEROL 75 08/25/2021    (goal LDL reduction with dx if diabetes is 50% LDL reduction)    PHQ Scores 3/2/2021 3/3/2020 9/3/2019 3/5/2019 3/6/2018 9/6/2016 9/1/2015   PHQ2 Score 0 0 0 0 0 0 0   PHQ9 Score 0 0 0 0 0 0 0     Interpretation of Total Score Depression Severity: 1-4 = Minimal depression, 5-9 = Mild depression, 10-14 = Moderate depression, 15-19 = Moderately severe depression, 20-27 = Severe depression        Return in about 6 months (around 2/28/2022) for HTN, Hyperlipidemia, hyprglycemia.       Electronically signed by Lucas Hand MD on 8/31/2021 at 10:39 AM

## 2021-09-01 DIAGNOSIS — E55.9 VITAMIN D DEFICIENCY DISEASE: ICD-10-CM

## 2021-09-01 DIAGNOSIS — R00.0 TACHYCARDIA: ICD-10-CM

## 2021-09-01 DIAGNOSIS — I10 ESSENTIAL HYPERTENSION, BENIGN: Primary | ICD-10-CM

## 2021-10-20 ENCOUNTER — HOSPITAL ENCOUNTER (OUTPATIENT)
Age: 76
Discharge: HOME | End: 2021-10-20
Payer: MEDICARE

## 2021-10-20 DIAGNOSIS — E55.9 VITAMIN D DEFICIENCY DISEASE: ICD-10-CM

## 2021-10-20 DIAGNOSIS — I10 ESSENTIAL HYPERTENSION, BENIGN: ICD-10-CM

## 2021-10-20 DIAGNOSIS — R00.0 TACHYCARDIA: ICD-10-CM

## 2021-10-20 LAB
ABSOLUTE EOS #: 0.3 K/UL (ref 0–0.4)
ABSOLUTE IMMATURE GRANULOCYTE: ABNORMAL K/UL (ref 0–0.3)
ABSOLUTE LYMPH #: 1.4 K/UL (ref 1–4.8)
ABSOLUTE MONO #: 0.9 K/UL (ref 0–1)
BASOPHILS # BLD: 0 % (ref 0–2)
BASOPHILS ABSOLUTE: 0 K/UL (ref 0–0.2)
DIFFERENTIAL TYPE: YES
EOSINOPHILS RELATIVE PERCENT: 4 % (ref 0–5)
HCT VFR BLD CALC: 44.6 % (ref 36–46)
HEMOGLOBIN: 15.2 G/DL (ref 12–16)
IMMATURE GRANULOCYTES: ABNORMAL %
LYMPHOCYTES # BLD: 18 % (ref 15–40)
MAGNESIUM: 2.2 MG/DL (ref 1.6–2.6)
MCH RBC QN AUTO: 29.9 PG (ref 26–34)
MCHC RBC AUTO-ENTMCNC: 34.1 G/DL (ref 31–37)
MCV RBC AUTO: 87.6 FL (ref 80–100)
MONOCYTES # BLD: 11 % (ref 4–8)
NRBC AUTOMATED: ABNORMAL PER 100 WBC
PDW BLD-RTO: 12.3 % (ref 12.1–15.2)
PLATELET # BLD: 331 K/UL (ref 140–450)
PLATELET ESTIMATE: ABNORMAL
PMV BLD AUTO: ABNORMAL FL (ref 6–12)
RBC # BLD: 5.09 M/UL (ref 4–5.2)
RBC # BLD: ABNORMAL 10*6/UL
SEG NEUTROPHILS: 67 % (ref 47–75)
SEGMENTED NEUTROPHILS ABSOLUTE COUNT: 5.3 K/UL (ref 2.5–7)
TSH SERPL DL<=0.05 MIU/L-ACNC: 1 MIU/L (ref 0.3–5)
VITAMIN D 25-HYDROXY: 34.3 NG/ML (ref 30–100)
WBC # BLD: 7.9 K/UL (ref 3.5–11)
WBC # BLD: ABNORMAL 10*3/UL

## 2021-10-20 PROCEDURE — 36415 COLL VENOUS BLD VENIPUNCTURE: CPT

## 2021-10-20 PROCEDURE — 93005 ELECTROCARDIOGRAM TRACING: CPT

## 2021-10-20 PROCEDURE — 83735 ASSAY OF MAGNESIUM: CPT

## 2021-10-20 PROCEDURE — 85025 COMPLETE CBC W/AUTO DIFF WBC: CPT

## 2021-10-20 PROCEDURE — 84443 ASSAY THYROID STIM HORMONE: CPT

## 2021-10-20 PROCEDURE — 82306 VITAMIN D 25 HYDROXY: CPT

## 2021-10-25 LAB
EKG ATRIAL RATE: 109 BPM
EKG P AXIS: 63 DEGREES
EKG P-R INTERVAL: 182 MS
EKG Q-T INTERVAL: 318 MS
EKG QRS DURATION: 80 MS
EKG QTC CALCULATION (BAZETT): 428 MS
EKG R AXIS: 57 DEGREES
EKG T AXIS: 57 DEGREES
EKG VENTRICULAR RATE: 109 BPM

## 2021-10-27 ENCOUNTER — OFFICE VISIT (OUTPATIENT)
Dept: CARDIOLOGY CLINIC | Age: 76
End: 2021-10-27
Payer: MEDICARE

## 2021-10-27 VITALS
SYSTOLIC BLOOD PRESSURE: 160 MMHG | WEIGHT: 168 LBS | BODY MASS INDEX: 30.73 KG/M2 | OXYGEN SATURATION: 92 % | HEART RATE: 108 BPM | DIASTOLIC BLOOD PRESSURE: 88 MMHG

## 2021-10-27 DIAGNOSIS — R06.02 SOB (SHORTNESS OF BREATH): Primary | ICD-10-CM

## 2021-10-27 PROCEDURE — 4040F PNEUMOC VAC/ADMIN/RCVD: CPT | Performed by: INTERNAL MEDICINE

## 2021-10-27 PROCEDURE — 99214 OFFICE O/P EST MOD 30 MIN: CPT | Performed by: INTERNAL MEDICINE

## 2021-10-27 PROCEDURE — G8417 CALC BMI ABV UP PARAM F/U: HCPCS | Performed by: INTERNAL MEDICINE

## 2021-10-27 PROCEDURE — 1036F TOBACCO NON-USER: CPT | Performed by: INTERNAL MEDICINE

## 2021-10-27 PROCEDURE — G8427 DOCREV CUR MEDS BY ELIG CLIN: HCPCS | Performed by: INTERNAL MEDICINE

## 2021-10-27 PROCEDURE — G8484 FLU IMMUNIZE NO ADMIN: HCPCS | Performed by: INTERNAL MEDICINE

## 2021-10-27 PROCEDURE — 1090F PRES/ABSN URINE INCON ASSESS: CPT | Performed by: INTERNAL MEDICINE

## 2021-10-27 PROCEDURE — G8400 PT W/DXA NO RESULTS DOC: HCPCS | Performed by: INTERNAL MEDICINE

## 2021-10-27 PROCEDURE — 1123F ACP DISCUSS/DSCN MKR DOCD: CPT | Performed by: INTERNAL MEDICINE

## 2021-10-27 NOTE — LETTER
Claudia Forman MD  [id number] [facility]  [facility]  [address], [facility] [address]  [phone]      2021      Brian Camargo MD  [address] [facility], [facility] [address]      RE:   Grayson Hodgson  :  1945      Dear Dr. Olamide Bill:    CHIEF COMPLAINT:  1. Tachycardia. 2.  SVT at 150 beats per minute, which has been well controlled on diltiazem. 3.  Continued sinus tachycardia with average heart rate in the 90 to 100 range. HISTORY OF PRESENT ILLNESS:  I had the pleasure of seeing Mrs. Alexandra in our office on 10/27/2021. She is a very pleasant 74-year-old [facility] for 6 days and made a recovery. Ever since that time, she has had episodes of tachycardia. She can feel her heart \"beating fast.\"    She had an event recorder on 07/15/2021, that showed sinus tachycardia. Her average heart rate was 92 on her event recorder. She had one episode of a 15-beat run of SVT at 166 beats per minute. We changed from Norvasc to Cardizem  mg and she had no further episodes of SVT. I am seeing her today in followup. She denies any chest pain. She has some shortness of breath, but it is mainly when she wears a mask. She takes her blood pressure and it is generally high in the morning. When she takes it in the evening, she states it is also sometimes \"high. \"    In reviewing her blood pressures, she consistently runs a heart rate in the high 80s to high 90 range. She does not feel her heart \"racing\" since she started Cardizem. She denies any chest pain or chest discomfort. She has had no PND, orthopnea or pedal edema. She is unlimited in her activities. CARDIAC RISK FACTORS:  Hypertension:  Positive. Hyperlipidemia:  Positive. Peripheral Vascular Disease:  Negative. Smoking:  Negative. Diabetes:  Negative.     MEDICATIONS AT THIS TIME:  She is on HydroDIURIL 25 mg daily, Cardizem  mg daily, Pravachol 20 mg daily, vitamin D 1000 units daily, vitamin E daily. PAST MEDICAL AND SURGICAL HISTORY:  1. She has had hyperlipidemia. 2.  Hypertension. 3.  Kidney stone. 4.  Tubal ligation. 5.  Appendectomy. 6.  Cholecystectomy in 2014.  7.  COVID in 06/2021 with admission for 6 days. 8.  SVT as stated previously. FAMILY HISTORY:  Her father had heart disease. Hyperlipidemia in brother and sister. SOCIAL HISTORY:  She is 67years old. . Does not smoke. Does not drink alcohol. Tries to stay active. REVIEW OF SYSTEMS:  Cardiac as above. Other systems reviewed including constitutional, eyes, ears, nose and throat, cardiovascular, respiratory, GI, , musculoskeletal, integumentary, neurologic, endocrine, hematologic and allergic/immunologic are negative except for what is described above. No weight loss or weight gain. No change in bowel habits. No blood in stools. No fevers, sweats or chills. PHYSICAL EXAMINATION:  VITAL SIGNS:  Her blood pressure was 160/88, with a heart rate of 108 and regular. Respiratory rate 18. O2 saturation 92%. Weight 168 pounds. GENERAL:  She is a pleasant 67-year-old female. Denied pain. She was oriented to person, place and time. Answered questions appropriately. SKIN:  No unusual skin changes. HEENT:  The pupils are equally round and intact. Mucous membranes were dry. NECK:  No JVD. Good carotid pulses. No carotid bruits. No lymphadenopathy or thyromegaly. CARDIOVASCULAR EXAM:  S1 and S2 were normal.  No S3 or S4. Soft systolic blowing type murmur. No diastolic murmur. PMI was normal.  No lift, thrust, or pericardial friction rub. LUNGS:  Quite clear to auscultation and percussion. ABDOMEN:  Soft and nontender. Good bowel sounds. EXTREMITIES:  Good femoral pulses. Good pedal pulses. No pedal edema. Skin was warm and dry. No calf tenderness. Nail beds pink. Good cap refill.   PULSES:  Bilateral symmetrical radial, brachial and carotid pulses. No carotid bruits. Good femoral and pedal pulses. NEUROLOGIC EXAM:  Within normal limits. PSYCHIATRIC EXAM:  Within normal limits. LABORATORY DATA:  Sodium 142, potassium 3.8, BUN 11, creatinine 0.65, GFR greater than 60, calcium 9.5. Cholesterol 137, HDL was 42, LDL 75, triglycerides 101. ALT was 12, AST was 22. Hemoglobin A1c was 4.8. TSH 1.0. Vitamin D 34.3. White count 7.9, hemoglobin 15.2, with platelet count [phone],735. EKG showed sinus tachycardia at 109 beats per minute, otherwise normal.    IMPRESSION:  1. Sinus tachycardia, with average heart rate in the 90 to 95 range. 2.  Hypertension. 3.  COVID in 06/2021.  4.  SVT documented on the event recorder at 150 beats per minute of 15 beats on 07/15/2021, better after starting Cardizem  mg daily. 5.  Hyperlipidemia, well controlled. PLAN:  1. Stop diltiazem. 2.  Start Lopressor 25 mg b.i.d.  3.  Keep track of her blood pressure and heart rate. 4.  We will see in three weeks for blood pressure check. DISCUSSION:  Mrs. Jackie Cowan had an episode of SVT on her event recorder at 150 beats per minute. However, in reviewing her event recorder and her blood pressures, her heart rate consistently is in the 90 range. Therefore, it seems like she is somewhat hyperadrenergic contributing to her SVT and her sinus tachycardia. I think she would do better on a beta-blocker rather than a calcium-channel blocker with her sinus tachycardia. With this in mind, I have stopped her Cardizem and switched her to Lopressor 25 mg b.i.d. She will continue to track her heart rate and her blood pressure at home and I will check it in three weeks. I suspect that we will need to increase that dose to 50 mg b.i.d.     She has a family history of coronary artery disease, but there is no chest pain or unusual shortness of breath to indicate that we need to do any stress test.  Bedside echocardiogram showed good LV function, although we would do a formal echocardiogram to document her LV size and function and looking for LVH. Thank you very much for allowing me the privilege of seeing Mrs. Alexandra. If you have any questions on my thoughts, please do not hesitate to contact me.      Sincerely,        Everton Clarke    D: 10/27/2021 23:15:45     T: 10/28/2021 11:49:57     [email or web address]  Job#: [id number]   Doc#: [id number]

## 2021-10-27 NOTE — PROGRESS NOTES
Ov DR Robert fernando   [facility] in June admitted for 6 days  Having episodes tachycardia since. Had event monitor on tachycardia  We switched pt med from amlodipine  To diltiazem. No chest pain   Some sob with walking. when wearing mask   bp runs high in the morning   After medication   In good range. Does not feel the racing heart. To stop diltiazem   And start lopressor 25 mg bid  See in 3 weeks bp check.

## 2021-10-29 NOTE — PROGRESS NOTES
Will Post MD  [facility]  [address], [facility] [address]  [phone]      2021      Jg Weiss MD  [address] [facility], [facility] [address]      RE:   Ping Class  :  1945      Dear Dr. Rajan Byrnes:    CHIEF COMPLAINT:  1. Tachycardia. 2.  SVT at 150 beats per minute, which has been well controlled on diltiazem. 3.  Continued sinus tachycardia with average heart rate in the 90 to 100 range. HISTORY OF PRESENT ILLNESS:  I had the pleasure of seeing Mrs. Alexandra in our office on 10/27/2021. She is a very pleasant 69-year-old [facility] She [facility] in the hospital for 6 days and made a recovery. Ever since that time, she has had episodes of tachycardia. She can feel her heart \"beating fast.\"    She had an event recorder on 07/15/2021, that showed sinus tachycardia. Her average heart rate was 92 on her event recorder. She had one episode of a 15-beat run of SVT at 166 beats per minute. We changed from Norvasc to Cardizem  mg and she had no further episodes of SVT. I am seeing her today in followup. She denies any chest pain. She has some shortness of breath, but it is mainly when she wears a mask. She takes her blood pressure and it is generally high in the morning. When she takes it in the evening, she states it is also sometimes \"high. \"    In reviewing her blood pressures, she consistently runs a heart rate in the high 80s to high 90 range. She does not feel her heart \"racing\" since she started Cardizem. She denies any chest pain or chest discomfort. She has had no PND, orthopnea or pedal edema. She is unlimited in her activities. CARDIAC RISK FACTORS:  Hypertension:  Positive. Hyperlipidemia:  Positive. Peripheral Vascular Disease:  Negative. Smoking:  Negative. Diabetes:  Negative.     MEDICATIONS AT THIS TIME:  She is on HydroDIURIL 25 mg daily, Cardizem  mg daily, Pravachol 20 mg daily, vitamin D 1000 units daily, vitamin E daily. PAST MEDICAL AND SURGICAL HISTORY:  1. She has had hyperlipidemia. 2.  Hypertension. 3.  Kidney stone. 4.  Tubal ligation. 5.  Appendectomy. 6.  Cholecystectomy in 2014.  7.  COVID in 06/2021 with admission for 6 days. 8.  SVT as stated previously. FAMILY HISTORY:  Her father had heart disease. Hyperlipidemia in brother and sister. SOCIAL HISTORY:  She is 67years old. . Does not smoke. Does not drink alcohol. Tries to stay active. REVIEW OF SYSTEMS:  Cardiac as above. Other systems reviewed including constitutional, eyes, ears, nose and throat, cardiovascular, respiratory, GI, , musculoskeletal, integumentary, neurologic, endocrine, hematologic and allergic/immunologic are negative except for what is described above. No weight loss or weight gain. No change in bowel habits. No blood in stools. No fevers, sweats or chills. PHYSICAL EXAMINATION:  VITAL SIGNS:  Her blood pressure was 160/88, with a heart rate of 108 and regular. Respiratory rate 18. O2 saturation 92%. Weight 168 pounds. GENERAL:  She is a pleasant 65-year-old female. Denied pain. She was oriented to person, place and time. Answered questions appropriately. SKIN:  No unusual skin changes. HEENT:  The pupils are equally round and intact. Mucous membranes were dry. NECK:  No JVD. Good carotid pulses. No carotid bruits. No lymphadenopathy or thyromegaly. CARDIOVASCULAR EXAM:  S1 and S2 were normal.  No S3 or S4. Soft systolic blowing type murmur. No diastolic murmur. PMI was normal.  No lift, thrust, or pericardial friction rub. LUNGS:  Quite clear to auscultation and percussion. ABDOMEN:  Soft and nontender. Good bowel sounds. EXTREMITIES:  Good femoral pulses. Good pedal pulses. No pedal edema. Skin was warm and dry. No calf tenderness. Nail beds pink. Good cap refill.   PULSES:  Bilateral symmetrical radial, brachial and carotid pulses. No carotid bruits. Good femoral and pedal pulses. NEUROLOGIC EXAM:  Within normal limits. PSYCHIATRIC EXAM:  Within normal limits. LABORATORY DATA:  Sodium 142, potassium 3.8, BUN 11, creatinine 0.65, GFR greater than 60, calcium 9.5. Cholesterol 137, HDL was 42, LDL 75, triglycerides 101. ALT was 12, AST was 22. Hemoglobin A1c was 4.8. TSH 1.0. Vitamin D 34.3. White count 7.9, hemoglobin 15.2, with platelet count 904,164. EKG showed sinus tachycardia at 109 beats per minute, otherwise normal.    IMPRESSION:  1. Sinus tachycardia, with average heart rate in the 90 to 95 range. 2.  Hypertension. 3.  COVID in 06/2021.  4.  SVT documented on the event recorder at 150 beats per minute of 15 beats on 07/15/2021, better after starting Cardizem  mg daily. 5.  Hyperlipidemia, well controlled. PLAN:  1. Stop diltiazem. 2.  Start Lopressor 25 mg b.i.d.  3.  Keep track of her blood pressure and heart rate. 4.  We will see in three weeks for blood pressure check. DISCUSSION:  Mrs. Samanta Anders had an episode of SVT on her event recorder at 150 beats per minute. However, in reviewing her event recorder and her blood pressures, her heart rate consistently is in the 90 range. Therefore, it seems like she is somewhat hyperadrenergic contributing to her SVT and her sinus tachycardia. I think she would do better on a beta-blocker rather than a calcium-channel blocker with her sinus tachycardia. With this in mind, I have stopped her Cardizem and switched her to Lopressor 25 mg b.i.d. She will continue to track her heart rate and her blood pressure at home and I will check it in three weeks. I suspect that we will need to increase that dose to 50 mg b.i.d.     She has a family history of coronary artery disease, but there is no chest pain or unusual shortness of breath to indicate that we need to do any stress test.  Bedside echocardiogram showed good LV function, although we would do a formal echocardiogram to document her LV size and function and looking for LVH. Thank you very much for allowing me the privilege of seeing Mrs. Alexandra. If you have any questions on my thoughts, please do not hesitate to contact me.      Sincerely,        Fish Cheney    D: 10/27/2021 23:15:45     T: 10/28/2021 11:49:57     [email or web address]  Job#: [id number]   Doc#: [id number]

## 2021-11-09 ENCOUNTER — HOSPITAL ENCOUNTER (EMERGENCY)
Age: 76
Discharge: ANOTHER HOSPITAL | End: 2021-11-09
Attending: EMERGENCY MEDICINE
Payer: MEDICARE

## 2021-11-09 ENCOUNTER — APPOINTMENT (OUTPATIENT)
Dept: GENERAL RADIOLOGY | Age: 76
End: 2021-11-09
Payer: MEDICARE

## 2021-11-09 VITALS
SYSTOLIC BLOOD PRESSURE: 109 MMHG | DIASTOLIC BLOOD PRESSURE: 84 MMHG | WEIGHT: 169 LBS | OXYGEN SATURATION: 92 % | RESPIRATION RATE: 20 BRPM | HEIGHT: 61 IN | TEMPERATURE: 98.1 F | HEART RATE: 96 BPM | BODY MASS INDEX: 31.91 KG/M2

## 2021-11-09 DIAGNOSIS — I21.4 NON-STEMI (NON-ST ELEVATED MYOCARDIAL INFARCTION) (HCC): Primary | ICD-10-CM

## 2021-11-09 LAB
ABSOLUTE EOS #: 0.2 K/UL (ref 0–0.4)
ABSOLUTE IMMATURE GRANULOCYTE: ABNORMAL K/UL (ref 0–0.3)
ABSOLUTE LYMPH #: 1.3 K/UL (ref 1–4.8)
ABSOLUTE MONO #: 0.6 K/UL (ref 0–1)
ANION GAP SERPL CALCULATED.3IONS-SCNC: 10 MMOL/L (ref 9–17)
BASOPHILS # BLD: 0 % (ref 0–2)
BASOPHILS ABSOLUTE: 0 K/UL (ref 0–0.2)
BUN BLDV-MCNC: 11 MG/DL (ref 8–23)
BUN/CREAT BLD: 18 (ref 9–20)
CALCIUM SERPL-MCNC: 9.6 MG/DL (ref 8.6–10.4)
CHLORIDE BLD-SCNC: 101 MMOL/L (ref 98–107)
CK MB: 9.7 NG/ML
CO2: 30 MMOL/L (ref 20–31)
CREAT SERPL-MCNC: 0.6 MG/DL (ref 0.5–0.9)
DIFFERENTIAL TYPE: YES
EOSINOPHILS RELATIVE PERCENT: 4 % (ref 0–5)
GFR AFRICAN AMERICAN: >60 ML/MIN
GFR NON-AFRICAN AMERICAN: >60 ML/MIN
GFR SERPL CREATININE-BSD FRML MDRD: ABNORMAL ML/MIN/{1.73_M2}
GFR SERPL CREATININE-BSD FRML MDRD: ABNORMAL ML/MIN/{1.73_M2}
GLUCOSE BLD-MCNC: 107 MG/DL (ref 70–99)
HCT VFR BLD CALC: 47 % (ref 36–46)
HEMOGLOBIN: 15.7 G/DL (ref 12–16)
IMMATURE GRANULOCYTES: ABNORMAL %
LYMPHOCYTES # BLD: 22 % (ref 15–40)
MCH RBC QN AUTO: 29 PG (ref 26–34)
MCHC RBC AUTO-ENTMCNC: 33.4 G/DL (ref 31–37)
MCV RBC AUTO: 86.9 FL (ref 80–100)
MONOCYTES # BLD: 10 % (ref 4–8)
NRBC AUTOMATED: ABNORMAL PER 100 WBC
PDW BLD-RTO: 13.1 % (ref 12.1–15.2)
PLATELET # BLD: 248 K/UL (ref 140–450)
PLATELET ESTIMATE: ABNORMAL
PMV BLD AUTO: ABNORMAL FL (ref 6–12)
POTASSIUM SERPL-SCNC: 4.2 MMOL/L (ref 3.7–5.3)
RBC # BLD: 5.41 M/UL (ref 4–5.2)
RBC # BLD: ABNORMAL 10*6/UL
SARS-COV-2, RAPID: NOT DETECTED
SEG NEUTROPHILS: 64 % (ref 47–75)
SEGMENTED NEUTROPHILS ABSOLUTE COUNT: 4 K/UL (ref 2.5–7)
SODIUM BLD-SCNC: 141 MMOL/L (ref 135–144)
SPECIMEN DESCRIPTION: NORMAL
TOTAL CK: 99 U/L (ref 26–192)
TROPONIN INTERP: ABNORMAL
TROPONIN INTERP: ABNORMAL
TROPONIN T: ABNORMAL NG/ML
TROPONIN T: ABNORMAL NG/ML
TROPONIN, HIGH SENSITIVITY: 192 NG/L (ref 0–14)
TROPONIN, HIGH SENSITIVITY: 240 NG/L (ref 0–14)
WBC # BLD: 6.1 K/UL (ref 3.5–11)
WBC # BLD: ABNORMAL 10*3/UL

## 2021-11-09 PROCEDURE — 87635 SARS-COV-2 COVID-19 AMP PRB: CPT

## 2021-11-09 PROCEDURE — 6360000002 HC RX W HCPCS: Performed by: EMERGENCY MEDICINE

## 2021-11-09 PROCEDURE — 84484 ASSAY OF TROPONIN QUANT: CPT

## 2021-11-09 PROCEDURE — 82553 CREATINE MB FRACTION: CPT

## 2021-11-09 PROCEDURE — 2500000003 HC RX 250 WO HCPCS: Performed by: EMERGENCY MEDICINE

## 2021-11-09 PROCEDURE — 93005 ELECTROCARDIOGRAM TRACING: CPT | Performed by: EMERGENCY MEDICINE

## 2021-11-09 PROCEDURE — C9803 HOPD COVID-19 SPEC COLLECT: HCPCS

## 2021-11-09 PROCEDURE — 85025 COMPLETE CBC W/AUTO DIFF WBC: CPT

## 2021-11-09 PROCEDURE — 80048 BASIC METABOLIC PNL TOTAL CA: CPT

## 2021-11-09 PROCEDURE — 71045 X-RAY EXAM CHEST 1 VIEW: CPT

## 2021-11-09 PROCEDURE — 2580000003 HC RX 258: Performed by: EMERGENCY MEDICINE

## 2021-11-09 PROCEDURE — 6370000000 HC RX 637 (ALT 250 FOR IP): Performed by: EMERGENCY MEDICINE

## 2021-11-09 PROCEDURE — 96374 THER/PROPH/DIAG INJ IV PUSH: CPT

## 2021-11-09 PROCEDURE — 82550 ASSAY OF CK (CPK): CPT

## 2021-11-09 PROCEDURE — 96372 THER/PROPH/DIAG INJ SC/IM: CPT

## 2021-11-09 PROCEDURE — 99284 EMERGENCY DEPT VISIT MOD MDM: CPT

## 2021-11-09 PROCEDURE — 36415 COLL VENOUS BLD VENIPUNCTURE: CPT

## 2021-11-09 RX ORDER — LABETALOL HYDROCHLORIDE 5 MG/ML
10 INJECTION, SOLUTION INTRAVENOUS EVERY 4 HOURS PRN
Status: DISCONTINUED | OUTPATIENT
Start: 2021-11-09 | End: 2021-11-09 | Stop reason: HOSPADM

## 2021-11-09 RX ORDER — ASPIRIN 81 MG/1
162 TABLET, CHEWABLE ORAL ONCE
Status: COMPLETED | OUTPATIENT
Start: 2021-11-09 | End: 2021-11-09

## 2021-11-09 RX ORDER — 0.9 % SODIUM CHLORIDE 0.9 %
500 INTRAVENOUS SOLUTION INTRAVENOUS ONCE
Status: COMPLETED | OUTPATIENT
Start: 2021-11-09 | End: 2021-11-09

## 2021-11-09 RX ADMIN — ASPIRIN 81 MG CHEWABLE TABLET 162 MG: 81 TABLET CHEWABLE at 12:06

## 2021-11-09 RX ADMIN — LABETALOL HYDROCHLORIDE 10 MG: 5 INJECTION, SOLUTION INTRAVENOUS at 14:07

## 2021-11-09 RX ADMIN — ENOXAPARIN SODIUM 80 MG: 80 INJECTION SUBCUTANEOUS at 13:31

## 2021-11-09 RX ADMIN — SODIUM CHLORIDE 500 ML: 9 INJECTION, SOLUTION INTRAVENOUS at 11:18

## 2021-11-09 RX ADMIN — NITROGLYCERIN 1 INCH: 20 OINTMENT TOPICAL at 13:28

## 2021-11-09 NOTE — ED NOTES
MD okay with patient eating dinner. Tray given to patient prior to transfer.       Sebastian Anderson RN  11/09/21 [id number]

## 2021-11-09 NOTE — ED NOTES
Pt to BR for void, lab called with trop level of 240    Doctor informed     Zoe Boudreaux, JUANI  11/09/21 0266

## 2021-11-09 NOTE — ED PROVIDER NOTES
HPI:  11/9/21,   Time: 11:08 AM IRIS Mallory is a 67 y.o. female presenting to the ED for left arm pain and headache and just did not feel right and felt cold all over, beginning about 2 hours ago. The complaint has been constant, moderate in severity, and worsened by nothing. Symptoms all resolved at this time and patient states it was not the worst headache of her life it was noticeable. No numbness weakness or tingling of the arms or legs and no difficulty walking or talking or seeing. Denies chest pain or abdominal pain and no vomiting or diarrhea    ROS:   Pertinent positives and negatives are stated within HPI, all other systems reviewed and are negative.  --------------------------------------------- PAST HISTORY ---------------------------------------------  Past Medical History:  has a past medical history of Hyperlipidemia, Hypertension, and Kidney stone. Past Surgical History:  has a past surgical history that includes Tubal ligation; Appendectomy; Colonoscopy (2009); and Cholecystectomy (10/09/14). Social History:  reports that she has never smoked. She has never used smokeless tobacco. She reports that she does not drink alcohol and does not use drugs. Family History: family history includes Cancer in her brother; Heart Disease (age of onset: 61) in her father; High Blood Pressure in her brother, mother, and sister; High Cholesterol in her brother and sister. The patients home medications have been reviewed. Allergies: Patient has no known allergies. -------------------------------------------------- RESULTS -------------------------------------------------  All laboratory and radiology results have been personally reviewed by myself   LABS:  Results for orders placed or performed during the hospital encounter of 11/09/21   COVID-19, Rapid    Specimen: Nasopharyngeal Swab   Result Value Ref Range    Specimen Description . NASOPHARYNGEAL SWAB     SARS-CoV-2, Rapid Not Detected Not Detected   CBC Auto Differential   Result Value Ref Range    WBC 6.1 3.5 - 11.0 k/uL    RBC 5.41 (H) 4.0 - 5.2 m/uL    Hemoglobin 15.7 12.0 - 16.0 g/dL    Hematocrit 47.0 (H) 36 - 46 %    MCV 86.9 80 - 100 fL    MCH 29.0 26 - 34 pg    MCHC 33.4 31 - 37 g/dL    RDW 13.1 12.1 - 15.2 %    Platelets 634 047 - 755 k/uL    MPV NOT REPORTED 6.0 - 12.0 fL    NRBC Automated NOT REPORTED per 100 WBC    Differential Type YES     Seg Neutrophils 64 47 - 75 %    Lymphocytes 22 15 - 40 %    Monocytes 10 (H) 4 - 8 %    Eosinophils % 4 0 - 5 %    Basophils 0 0 - 2 %    Immature Granulocytes NOT REPORTED 0 %    Segs Absolute 4.00 2.5 - 7.0 k/uL    Absolute Lymph # 1.30 1.0 - 4.8 k/uL    Absolute Mono # 0.60 0.0 - 1.0 k/uL    Absolute Eos # 0.20 0.0 - 0.4 k/uL    Basophils Absolute 0.00 0.0 - 0.2 k/uL    Absolute Immature Granulocyte NOT REPORTED 0.00 - 0.30 k/uL    WBC Morphology NOT REPORTED     RBC Morphology NOT REPORTED     Platelet Estimate NOT REPORTED    Basic Metabolic Panel w/ Reflex to MG   Result Value Ref Range    Glucose 107 (H) 70 - 99 mg/dL    BUN 11 8 - 23 mg/dL    CREATININE 0.60 0.50 - 0.90 mg/dL    Bun/Cre Ratio 18 9 - 20    Calcium 9.6 8.6 - 10.4 mg/dL    Sodium 141 135 - 144 mmol/L    Potassium 4.2 3.7 - 5.3 mmol/L    Chloride 101 98 - 107 mmol/L    CO2 30 20 - 31 mmol/L    Anion Gap 10 9 - 17 mmol/L    GFR Non-African American >60 >60 mL/min    GFR African American >60 >60 mL/min    GFR Comment          GFR Staging NOT REPORTED    Troponin   Result Value Ref Range    Troponin, High Sensitivity 192 (HH) 0 - 14 ng/L    Troponin T NOT REPORTED <0.03 ng/mL    Troponin Interp NOT REPORTED    CK   Result Value Ref Range    Total CK 99 26 - 192 U/L   CK MB   Result Value Ref Range    CK-MB 9.7 (H) <5.4 ng/mL   Troponin   Result Value Ref Range    Troponin, High Sensitivity 240 (HH) 0 - 14 ng/L    Troponin T NOT REPORTED <0.03 ng/mL    Troponin Interp NOT REPORTED    EKG 12 Lead   Result Value Ref Range Ventricular Rate 91 BPM    Atrial Rate 91 BPM    P-R Interval 198 ms    QRS Duration 78 ms    Q-T Interval 358 ms    QTc Calculation (Bazett) 440 ms    P Axis 43 degrees    R Axis 13 degrees    T Axis 45 degrees   EKG 12 Lead   Result Value Ref Range    Ventricular Rate 81 BPM    Atrial Rate 81 BPM    P-R Interval 192 ms    QRS Duration 80 ms    Q-T Interval 376 ms    QTc Calculation (Bazett) 436 ms    P Axis 55 degrees    R Axis 11 degrees    T Axis 46 degrees       RADIOLOGY:  Interpreted by Radiologist.  XR CHEST PORTABLE   Final Result      No acute change.                      ------------------------- NURSING NOTES AND VITALS REVIEWED ---------------------------   The nursing notes within the ED encounter and vital signs as below have been reviewed. /84   Pulse 96   Temp 98.1 °F (36.7 °C)   Resp 20   Ht 5' 1\" (1.549 m)   Wt 169 lb (76.7 kg)   SpO2 92%   BMI 31.93 kg/m²   Oxygen Saturation Interpretation: Normal      ---------------------------------------------------PHYSICAL EXAM--------------------------------------      Constitutional/General: Alert and oriented x3, well appearing, non toxic in NAD  Head: NC/AT  Eyes: PERRL, EOMI  Mouth: Oropharynx clear, handling secretions, no trismus  Neck: Supple, full ROM, no meningeal signs  Pulmonary: Lungs clear to auscultation bilaterally, no wheezes, rales, or rhonchi. Not in respiratory distress  Cardiovascular:  Regular rate and rhythm, no murmurs, gallops, or rubs. 2+ distal pulses  Abdomen: Soft, non tender, non distended,   Extremities: Moves all extremities x 4.  Warm and well perfused  Skin: warm and dry without rash  Neurologic: GCS 15, no focal deficits   psych: Normal Affect      ------------------------------ ED COURSE/MEDICAL DECISION MAKING----------------------  Medications   0.9 % sodium chloride bolus (0 mLs IntraVENous Stopped 11/9/21 1232)   aspirin chewable tablet 162 mg (162 mg Oral Given 11/9/21 1206)   enoxaparin (LOVENOX) injection 80 mg (80 mg SubCUTAneous Given 11/9/21 1331)   nitroglycerin (NITRO-BID) 2 % ointment 1 inch (1 inch Topical Given 11/9/21 1328)         Medical Decision Making:    Malaise and left arm pain and headache which are all resolved rule out cardiac issue    Counseling: The emergency provider has spoken with the patient and discussed todays results, in addition to providing specific details for the plan of care and counseling regarding the diagnosis and prognosis. Questions are answered at this time and they are agreeable with the plan.      --------------------------------- IMPRESSION AND DISPOSITION ---------------------------------    IMPRESSION  1.  Non-STEMI (non-ST elevated myocardial infarction) ([facility] [address] New Problem       DISPOSITION  Disposition: Transfer to [facility]   Patient condition is stable      Critical care time of 45 minutes          Jenny Sloan MD  11/11/21 [id number]

## 2021-11-10 LAB
EKG ATRIAL RATE: 81 BPM
EKG ATRIAL RATE: 91 BPM
EKG P AXIS: 43 DEGREES
EKG P AXIS: 55 DEGREES
EKG P-R INTERVAL: 192 MS
EKG P-R INTERVAL: 198 MS
EKG Q-T INTERVAL: 358 MS
EKG Q-T INTERVAL: 376 MS
EKG QRS DURATION: 78 MS
EKG QRS DURATION: 80 MS
EKG QTC CALCULATION (BAZETT): 436 MS
EKG QTC CALCULATION (BAZETT): 440 MS
EKG R AXIS: 11 DEGREES
EKG R AXIS: 13 DEGREES
EKG T AXIS: 45 DEGREES
EKG T AXIS: 46 DEGREES
EKG VENTRICULAR RATE: 81 BPM
EKG VENTRICULAR RATE: 91 BPM

## 2021-11-10 PROCEDURE — 93010 ELECTROCARDIOGRAM REPORT: CPT | Performed by: INTERNAL MEDICINE

## 2021-11-17 ENCOUNTER — OFFICE VISIT (OUTPATIENT)
Dept: FAMILY MEDICINE CLINIC | Age: 76
End: 2021-11-17
Payer: MEDICARE

## 2021-11-17 ENCOUNTER — OFFICE VISIT (OUTPATIENT)
Dept: CARDIOLOGY CLINIC | Age: 76
End: 2021-11-17
Payer: MEDICARE

## 2021-11-17 VITALS
SYSTOLIC BLOOD PRESSURE: 175 MMHG | BODY MASS INDEX: 30.99 KG/M2 | WEIGHT: 164 LBS | DIASTOLIC BLOOD PRESSURE: 80 MMHG | OXYGEN SATURATION: 94 % | HEART RATE: 80 BPM

## 2021-11-17 VITALS
OXYGEN SATURATION: 95 % | HEIGHT: 61 IN | BODY MASS INDEX: 31.34 KG/M2 | SYSTOLIC BLOOD PRESSURE: 148 MMHG | HEART RATE: 80 BPM | DIASTOLIC BLOOD PRESSURE: 100 MMHG | WEIGHT: 166 LBS

## 2021-11-17 DIAGNOSIS — E55.9 VITAMIN D DEFICIENCY DISEASE: ICD-10-CM

## 2021-11-17 DIAGNOSIS — E87.6 LOW BLOOD POTASSIUM: ICD-10-CM

## 2021-11-17 DIAGNOSIS — I21.4 NSTEMI (NON-ST ELEVATED MYOCARDIAL INFARCTION) (HCC): ICD-10-CM

## 2021-11-17 DIAGNOSIS — I10 ESSENTIAL HYPERTENSION, BENIGN: ICD-10-CM

## 2021-11-17 DIAGNOSIS — I10 ESSENTIAL HYPERTENSION, BENIGN: Primary | ICD-10-CM

## 2021-11-17 DIAGNOSIS — I25.10 CORONARY ARTERY DISEASE INVOLVING NATIVE HEART WITHOUT ANGINA PECTORIS, UNSPECIFIED VESSEL OR LESION TYPE: ICD-10-CM

## 2021-11-17 DIAGNOSIS — I25.42 SPONTANEOUS DISSECTION OF CORONARY ARTERY: ICD-10-CM

## 2021-11-17 DIAGNOSIS — I21.4 NSTEMI (NON-ST ELEVATED MYOCARDIAL INFARCTION) (HCC): Primary | ICD-10-CM

## 2021-11-17 DIAGNOSIS — E78.5 HYPERLIPIDEMIA, UNSPECIFIED HYPERLIPIDEMIA TYPE: ICD-10-CM

## 2021-11-17 PROCEDURE — G8417 CALC BMI ABV UP PARAM F/U: HCPCS | Performed by: FAMILY MEDICINE

## 2021-11-17 PROCEDURE — 1123F ACP DISCUSS/DSCN MKR DOCD: CPT | Performed by: INTERNAL MEDICINE

## 2021-11-17 PROCEDURE — G8400 PT W/DXA NO RESULTS DOC: HCPCS | Performed by: FAMILY MEDICINE

## 2021-11-17 PROCEDURE — G8484 FLU IMMUNIZE NO ADMIN: HCPCS | Performed by: INTERNAL MEDICINE

## 2021-11-17 PROCEDURE — 1090F PRES/ABSN URINE INCON ASSESS: CPT | Performed by: INTERNAL MEDICINE

## 2021-11-17 PROCEDURE — 99214 OFFICE O/P EST MOD 30 MIN: CPT | Performed by: FAMILY MEDICINE

## 2021-11-17 PROCEDURE — 1123F ACP DISCUSS/DSCN MKR DOCD: CPT | Performed by: FAMILY MEDICINE

## 2021-11-17 PROCEDURE — 1036F TOBACCO NON-USER: CPT | Performed by: INTERNAL MEDICINE

## 2021-11-17 PROCEDURE — G8427 DOCREV CUR MEDS BY ELIG CLIN: HCPCS | Performed by: INTERNAL MEDICINE

## 2021-11-17 PROCEDURE — 1090F PRES/ABSN URINE INCON ASSESS: CPT | Performed by: FAMILY MEDICINE

## 2021-11-17 PROCEDURE — G8400 PT W/DXA NO RESULTS DOC: HCPCS | Performed by: INTERNAL MEDICINE

## 2021-11-17 PROCEDURE — G8417 CALC BMI ABV UP PARAM F/U: HCPCS | Performed by: INTERNAL MEDICINE

## 2021-11-17 PROCEDURE — G8484 FLU IMMUNIZE NO ADMIN: HCPCS | Performed by: FAMILY MEDICINE

## 2021-11-17 PROCEDURE — 4040F PNEUMOC VAC/ADMIN/RCVD: CPT | Performed by: FAMILY MEDICINE

## 2021-11-17 PROCEDURE — 4040F PNEUMOC VAC/ADMIN/RCVD: CPT | Performed by: INTERNAL MEDICINE

## 2021-11-17 PROCEDURE — G8427 DOCREV CUR MEDS BY ELIG CLIN: HCPCS | Performed by: FAMILY MEDICINE

## 2021-11-17 PROCEDURE — 1036F TOBACCO NON-USER: CPT | Performed by: FAMILY MEDICINE

## 2021-11-17 PROCEDURE — 99214 OFFICE O/P EST MOD 30 MIN: CPT | Performed by: INTERNAL MEDICINE

## 2021-11-17 PROCEDURE — 1111F DSCHRG MED/CURRENT MED MERGE: CPT | Performed by: FAMILY MEDICINE

## 2021-11-17 RX ORDER — ATORVASTATIN CALCIUM 80 MG/1
80 TABLET, FILM COATED ORAL DAILY
COMMUNITY
Start: 2021-11-12 | End: 2021-11-17 | Stop reason: SDUPTHER

## 2021-11-17 RX ORDER — CLOPIDOGREL BISULFATE 75 MG/1
75 TABLET ORAL DAILY
COMMUNITY
Start: 2021-11-12 | End: 2021-11-30 | Stop reason: SDUPTHER

## 2021-11-17 RX ORDER — METOPROLOL SUCCINATE 50 MG/1
50 TABLET, EXTENDED RELEASE ORAL 2 TIMES DAILY
COMMUNITY
Start: 2021-11-12 | End: 2021-11-30 | Stop reason: SDUPTHER

## 2021-11-17 RX ORDER — ASPIRIN 81 MG/1
81 TABLET ORAL DAILY
COMMUNITY
Start: 2021-11-12 | End: 2021-12-12

## 2021-11-17 RX ORDER — ATORVASTATIN CALCIUM 80 MG/1
80 TABLET, FILM COATED ORAL DAILY
Qty: 30 TABLET | Refills: 11 | Status: SHIPPED | OUTPATIENT
Start: 2021-11-17 | End: 2022-09-08

## 2021-11-17 ASSESSMENT — ENCOUNTER SYMPTOMS
EYE REDNESS: 0
SHORTNESS OF BREATH: 0
NAUSEA: 0
ABDOMINAL PAIN: 0
CONSTIPATION: 0
EYE DISCHARGE: 0
VOMITING: 0
BLOOD IN STOOL: 0
DIARRHEA: 0
COUGH: 0

## 2021-11-17 NOTE — PROGRESS NOTES
Ov DR Kallie Marquez follow up post discharge  From [facility]  With [place] on 11/9 had SCAD of OM1  No c/o chest pain or sob  C/o being tired and weak  Seen DR Agnieszka Breaux today also  hctz was stopped in the hospital   Restarted today per DR Agnieszka Breaux. Will do cardiac rehab. Will see in 3 mths.

## 2021-11-17 NOTE — LETTER
Lucinda Espinal M.D. [address] [place] [address]  [phone]        2021        Todd Mayo MD  [address] [facility] [address]    RE:   [facility]  :  1945    Dear Dr. Michael Prasad:    CHIEF COMPLAINT:  Status post myocardial infarction on 11/10/2021, with catheterization showing spontaneous dissection of the distal portion of the first OM with normal LAD and circumflex and normal LV function. HISTORY OF PRESENT ILLNESS:  I had the pleasure of seeing Mrs. Alexandra in the office on 2021. I trust you received my full H and P from 10/27/2021. I saw for routine exam and at that time, she was doing well. She had no complaints. I stopped diltiazem and started Lopressor 25 mg b.i.d. because of mild tachycardia with heart rates in the 90 range. She had SVT on the event recorder on 07/15/2021, which was better after starting Cardizem. She came to the emergency room after sudden onset of chest pain on 2021. Her enzymes were positive and she was transferred to [facility] in [place]. She had a catheterization by Dr. Holger Azevedo. The catheterization showed a spontaneous dissection of her first OM (SCAD). Fortunately, she has no LV dysfunction. She was discharged. Her hydrochlorothiazide had been stopped in the hospital and she was placed on Lipitor 80 mg daily. She had been on Pravachol. When I see her today, she is doing well. She has had no chest pain, chest discomfort, or any unusual shortness of breath. No PND, orthopnea, or pedal edema. Her hydrochlorothiazide had been stopped in the hospital, it was restarted again today by Dr. Michael Prasad.     MEDICATIONS:  Her medications today are aspirin 81 mg daily, Lipitor 80 mg daily, Plavix 75 mg daily, HydroDIURIL 25 mg daily, Toprol-XL 50 mg b.i.d., and vitamin D 1000 units daily,    PHYSICAL EXAMINATION:  VITAL SIGNS:  Her blood pressure was 175/80 with a heart rate of 80 and regular. Respiratory rate 18. O2 sat 94%. Weight 164 pounds  GENERAL:  She is a very pleasant 50-year-old female. Denied pain. She was oriented to person, place and time. Answered questions appropriately. SKIN:  No unusual skin changes. HEENT:  The pupils are equally round and intact. Mucous membranes were dry. NECK:  No JVD. Good carotid pulses. No carotid bruits. No lymphadenopathy or thyromegaly. CARDIOVASCULAR EXAM:  S1 and S2 were normal.  No S3 or S4. Soft systolic blowing type murmur. No diastolic murmur. PMI was normal.  No lift, thrust, or pericardial friction rub. LUNGS:  Clear to auscultation and percussion. ABDOMEN:  Soft and nontender. Good bowel sounds. EXTREMITIES:  Good femoral pulses. Good pedal pulses. No pedal edema. Skin was warm and dry. No calf tenderness. Nail beds pink. Good cap refill. PULSES:  Bilateral symmetrical radial, brachial and carotid pulses. No carotid bruits. Good femoral and pedal pulses. NEUROLOGIC EXAM:  Within normal limits. PSYCHIATRIC EXAM:  Within normal limits. LABORATORY DATA:  Her sodium was 141, potassium 4.2, BUN 11, creatinine 0.60, GFR greater than 60. Glucose 107, calcium was 9.6. Her white count was 6.1, hemoglobin 15.7 with a platelet count 062,514. Her troponin on 11/09, was 240. EKG showed sinus rhythm and was normal in the emergency room on 11/09. IMPRESSION:  1. 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 Rich Figures showing dissection in the distal portion of the first OM with normal right coronary artery and LAD, normal LV function. 2.  Hypertension. 3.  Hyperlipidemia. 4.  History of SVT. PLAN:  1. Start cardiac rehab where we can monitor her rhythm and her blood pressure. 2.  We will see her again in 3 months for full evaluation. DISCUSSION:  Mrs. Paxton Son had sudden coronary artery dissection of her circumflex on 11/09.   She had, of course, no warning signs and no angina prior to the dissection. As you well know, these usually heal spontaneously with no intervention needed. She also had no coronary artery disease in any of the three coronary arteries. We will place in cardiac rehab to further monitor her rhythm and her blood pressure. Her statin was changed from pravastatin to Lipitor. She looks good and I expect that she will do well long-term. We will place her in cardiac rehab and I will see her in 3 months in followup. Thank you very much for allowing me the privilege of seeing Mrs. Alexandra. If you have any questions on my thoughts, please do not hesitate to contact me.     Sincerely,        Harinder Shah    D: 11/17/2021 22:37:22     T: 11/17/2021 22:41:46     [email or web address]  Job#: [id number]   Doc#: [id number]

## 2021-11-17 NOTE — PROGRESS NOTES
HPI Notes    Name: Maria Elena Asif  : 1945        Chief Complaint:     Chief Complaint   Patient presents with   [address] from [facility]     21 [facility] ED transferred to [facility], NSTEMI. Increased metoprolol from 25 to 50 mg BID. Added on plavix and asa. Added on Lipitor    Discuss Medications     Added on lipitor, questions continuing on the pravachol       History of Present Illness:     Maria Elena Asif is a 67 y.o.  female who presents with Follow-Up from [facility] (21 [facility] ED [facility] to [facility], NSTEMI. Increased metoprolol from 25 to 50 mg BID. Added on plavix and asa. Added on Lipitor) and Discuss Medications (Added on lipitor, questions continuing on the pravachol)      HPI  Non STEMI - on 21, pt started with a pain Lt upper back and the pain ran down the Lt arm and very suddenly the Lt arm was cold. Pt took a baby ASA and symptoms went away for a little while but then returned. When symptoms returned pt terri to the [facility] ER. Pt had no EKG changes but had 2 elevated troponin and pt was transported to [facility]. Pt had a heart cath showing EF 65%   -And shows spontaneous coronary artery dissection of OM-1 so pt needed no stents. Pt has no pain now. Pt is just a little tired and does SOB with more activity,. No blood in stool or urine. No dizziness. Pt is now on lipitor 80mg and wants to know if still needs to take pravachol? Pt also on plavix and ASA daily. Pt also had toprol increased to 50mg BID. Pt states she really didn't have MI and could this be from having COVID? HTN - pt has been on HCTZ for years and her potassium generally 3.6 or greater. When pt went to ER her potassium 4.5 but in [facility] her potassium dropped so pt had her toprol increased and told to STOP the HCTZ.  So now pt off HCTZ and her BP is elevated today more then usual. Pt's home BP are 111-120/70-80s     Low potassium - pt was on HCTZ and potassium ck'd every 6mos and generally >3.6 but then in [place] it was lower. Past Medical History:     Past Medical History:   Diagnosis Date    Hyperlipidemia     Hypertension     Kidney stone       Reviewed all health maintenance requirements and ordered appropriate tests  Health Maintenance Due   Topic Date Due    Hepatitis C screen  Never done    COVID-19 Vaccine (1) Never done    DTaP/Tdap/Td vaccine (1 - Tdap) Never done    Shingles Vaccine (1 of 2) Never done    Flu vaccine (1) 09/01/2021       Past Surgical History:     Past Surgical History:   Procedure Laterality Date    APPENDECTOMY      CHOLECYSTECTOMY  10/09/14    laparoscopy    COLONOSCOPY  2009    wnl    TUBAL LIGATION          Medications:       Prior to Admission medications    Medication Sig Start Date End Date Taking? Authorizing Provider   aspirin 81 MG EC tablet Take 81 mg by mouth daily 11/12/21 12/12/21 Yes Historical Provider, MD   clopidogrel (PLAVIX) 75 MG tablet Take 75 mg by mouth daily 11/12/21 12/12/21 Yes Historical Provider, MD   metoprolol succinate (TOPROL XL) 50 MG extended release tablet Take 50 mg by mouth 2 times daily  11/12/21  Yes Historical Provider, MD   zinc gluconate 50 MG tablet Take 50 mg by mouth daily   Yes Historical Provider, MD   vitamin D (CHOLECALCIFEROL) 1000 UNIT TABS tablet Take 1,000 Units by mouth daily   Yes Historical Provider, MD   Misc Natural Products (GLUCOSAMINE CHONDROITIN ADV) TABS Take 1 tablet by mouth 2 times daily. Glucosamine 1500mg / Chondroitin 1200mg   Yes Historical Provider, MD   Calcium-Vitamin D (CALTRATE 600 PLUS-VIT D PO) Take 2 tablets by mouth daily. Yes Historical Provider, MD   ascorbic acid (VITAMIN C) 500 MG tablet Take 500 mg by mouth daily. Yes Historical Provider, MD   vitamin E 400 UNIT capsule Take 400 Units by mouth daily.    Yes Historical Provider, MD   atorvastatin (LIPITOR) 80 MG tablet Take 1 tablet by mouth daily 11/17/21 12/17/21  Lolly Lake MD   hydroCHLOROthiazide (HYDRODIURIL) 25 MG tablet TAKE 1 TABLET BY MOUTH EVERY DAY 8/31/21   Rudine Gaucher, MD        Allergies:       Patient has no known allergies. Social History:     Tobacco:    reports that she has never smoked. She has never used smokeless tobacco.  Alcohol:      reports no history of alcohol use. Drug Use:  reports no history of drug use. Family History:     Family History   Problem Relation Age of Onset    High Blood Pressure Mother     Heart Disease Father 61    High Blood Pressure Sister     High Cholesterol Sister     Cancer Brother         colon    High Blood Pressure Brother     High Cholesterol Brother        Review of Systems:       Review of Systems   Constitutional: Negative for chills, fatigue, fever and unexpected weight change. Eyes: Negative for discharge, redness and visual disturbance. Respiratory: Negative for cough and shortness of breath. Cardiovascular: Negative for chest pain, palpitations and leg swelling. Gastrointestinal: Negative for abdominal pain, blood in stool, constipation, diarrhea, nausea and vomiting. Genitourinary: Negative for dysuria and hematuria. Musculoskeletal: Negative for joint swelling and neck pain. Skin: Negative for rash. Neurological: Negative for dizziness, tremors, light-headedness and headaches. Psychiatric/Behavioral: Negative for sleep disturbance. Physical Exam:     Physical Exam  Vitals reviewed. Constitutional:       General: She is not in acute distress. Appearance: Normal appearance. She is well-developed. She is not ill-appearing. HENT:      Head: Normocephalic and atraumatic. Eyes:      General:         Right eye: No discharge. Left eye: No discharge. Conjunctiva/sclera: Conjunctivae normal.   Neck:      Thyroid: No thyromegaly. Cardiovascular:      Rate and Rhythm: Normal rate and regular rhythm. Heart sounds: Normal heart sounds. No murmur heard.       Pulmonary:      Effort: Pulmonary effort is normal. No respiratory distress. Breath sounds: Normal breath sounds. No wheezing. Abdominal:      Palpations: Abdomen is soft. Tenderness: There is no abdominal tenderness. Musculoskeletal:      Cervical back: Neck supple. Right lower leg: No edema. Left lower leg: No edema. Lymphadenopathy:      Cervical: No cervical adenopathy. Skin:     Findings: No erythema or rash. Neurological:      General: No focal deficit present. Mental Status: She is alert and oriented to person, place, and time. Psychiatric:         Mood and Affect: Mood normal.         Behavior: Behavior normal.         Vitals:  BP (!) 148/100   Pulse 80   Ht 5' 1\" (1.549 m)   Wt 166 lb (75.3 kg)   SpO2 95%   BMI 31.37 kg/m²       Data:     Lab Results   Component Value Date     11/09/2021    K 4.2 11/09/2021     11/09/2021    CO2 30 11/09/2021    BUN 11 11/09/2021    CREATININE 0.60 11/09/2021    GLUCOSE 107 11/09/2021    GLUCOSE 102 03/02/2012    PROT 7.2 08/25/2021    LABALBU 4.0 08/25/2021    LABALBU 4.6 03/02/2012    BILITOT 1.41 08/25/2021    ALKPHOS 62 08/25/2021    AST 22 08/25/2021    ALT 12 08/25/2021     Lab Results   Component Value Date    WBC 6.1 11/09/2021    RBC 5.41 11/09/2021    HGB 15.7 11/09/2021    HCT 47.0 11/09/2021    MCV 86.9 11/09/2021    MCH 29.0 11/09/2021    MCHC 33.4 11/09/2021    RDW 13.1 11/09/2021     11/09/2021    MPV NOT REPORTED 11/09/2021     Lab Results   Component Value Date    TSH 1.00 10/20/2021     Lab Results   Component Value Date    CHOL 137 08/25/2021    HDL 42 08/25/2021    LABA1C 4.8 08/25/2021          Assessment/Plan:        1. NSTEMI (non-ST elevated myocardial infarction) ([facility] [address]  Resolved and actually seemed secondary to th OM. pT seeing DR John Arriola today     2. Essential hypertension, benign  Pt to continue on the toprol 50mg BID would like pt to go back on the HCTZ 25mg but pt also seeing cardio office after this appt.  So if BP there up too and pt check BP with home BP. If too low then stopp the HCTZ    3. Low blood potassium  Anival potassium in 2-3wk and eat more potassium rich foods IF she doesn't add HCTZ back then no need to ck potassium   - Potassium; Future        Eva received counseling on the following healthy behaviors: nutrition and exercise  Reviewed prior labs and health maintenance  Continue current medications, diet and exercise. Discussed use, benefit, and side effects of prescribed medications. Barriers to medication compliance addressed. Patient given educational materials - see patient instructions  Was a self-tracking handout given in paper form or via [employer]? Yes    Requested Prescriptions      No prescriptions requested or ordered in this encounter       All patient questions answered. Patient voiced understanding. Quality Measures    Body mass index is 31.37 kg/m². Elevated. Weight control planned discussed Healthy diet and regular exercise. BP: (!) 148/100. Blood pressure is normal. Treatment plan consists of adding back HCTZ  and No treatment change needed. Fall Risk 3/2/2021 9/3/2019 3/6/2018 9/6/2016 9/1/2015 9/3/2014   2 or more falls in past year? no yes no no no no   Fall with injury in past year? no no no no no no     The patient    a history of falls. I did not - not indicated , complete a risk assessment for falls. A plan of care for falls No Treatment plan indicated    Lab Results   Component Value Date    LDLCHOLESTEROL 75 08/25/2021    (goal LDL reduction with dx if diabetes is 50% LDL reduction)    PHQ Scores 3/2/2021 3/3/2020 9/3/2019 3/5/2019 3/6/2018 9/6/2016 9/1/2015   PHQ2 Score 0 0 0 0 0 0 0   PHQ9 Score 0 0 0 0 0 0 0     Interpretation of Total Score Depression Severity: 1-4 = Minimal depression, 5-9 = Mild depression, 10-14 = Moderate depression, 15-19 = Moderately severe depression, 20-27 = Severe depression        Return keep 3/1/21 appt with me.       Electronically signed by Garret Macdonald MD on 11/17/2021 at 11:02 PM

## 2021-11-17 NOTE — PATIENT INSTRUCTIONS
Survey: You may be receiving a survey from [employer] regarding your visit today. You may get this in the mail, through your MyChart or in your email. Please complete the survey to enable us to provide the highest quality of care to you and your family. Please also, mention our names. If you cannot score us as very good (5 Stars) on any question, please feel free to call the office to discuss how we could have made your experience exceptional.      Thank You!         MD Marty Carmona LPN

## 2021-11-18 ENCOUNTER — HOSPITAL ENCOUNTER (OUTPATIENT)
Dept: CARDIAC REHAB | Age: 76
Setting detail: THERAPIES SERIES
Discharge: HOME | End: 2021-11-18
Payer: MEDICARE

## 2021-11-18 VITALS — BODY MASS INDEX: 30.99 KG/M2 | HEIGHT: 61 IN

## 2021-11-18 PROCEDURE — 93798 PHYS/QHP OP CAR RHAB W/ECG: CPT

## 2021-11-18 NOTE — PROGRESS NOTES
Cardiac Rehab Initial History and Assessment    Eduardo Michaels [facility]   1945  [id number]  11/18/2021    Primary Diagnosis: myocardial infarction  Living Will: [x] Yes   [] No  On File: [] Yes   [] No   [x] ? Durable Power of : [] Yes   [x] No    Medical History  Past Medical History:   Diagnosis Date    Hyperlipidemia     Hypertension     Kidney stone      Past Surgical History:   Procedure Laterality Date    APPENDECTOMY      CARDIAC SURGERY      CHOLECYSTECTOMY  10/09/14    laparoscopy    COLONOSCOPY  2009    wnl    TUBAL LIGATION         Family History  Family History   Problem Relation Age of Onset    High Blood Pressure Mother     Heart Disease Father 61    Heart Attack Father     High Blood Pressure Sister     High Cholesterol Sister     Cancer Brother         colon    High Blood Pressure Brother     High Cholesterol Brother          Symptoms:  1. Angina   [] None   [] Tightness   [] Shortness of Breath   [] Pressure    [x] ARM \"COOL, HEAVY\"   [] Sharp, Stabbing  [] Pallor   [] Indigestion, Heartburn [] Sweaty    Where was discomfort located? LT ARM  Precipitating Factors? INSIDIOUS ONSET  Relieved by: MEDICAL INTERVENTION    2. Arrhythmia   [] None   [x] OCC FAST Beats (skips) [] Pacer    [] Atrial Fibrillation  [] AICD    On any Medications? TOPROL XL 25 MG, BID    3. Congestive Heart Failure   [] None   [] Pedal Edema  [] Unusual weight gain   [] SOB with mild exertion [] Fatigue    4. Vascular   [x] None   [] Carotid Narrowing  [] R [] L   [] Peripheral claudication [] R [] L    5. Musculoskeletal    [] None   [x] Back Pain  Where? OCC CHRONIC-INTERMITTENT \"ACHE\"   [x] Joint discomfort Where?  [address] CHRONIC-INTERMITTENT \"ACHE\" [facility]NEE    Socio-Economic    Marital Status:      Nutrition    Appetite:  []  Too Good    []  Good  [x]  FAIR    Diet: \"TRY TO EAT HEALTHY BUT HARD BECAUSE OF 'S PREFERENCES\"  Eating out 3 times/wk  Alcohol Consumption: [] Yes [x] No       Caffeine: [] Yes [x] No      Water intake per day: 6 BOTTLES PER DAY  Vitamins/Natural herbal products: YES--SEE MAR    Psychological    [] Depression  [] Tearful  [] Fearful  [x] Cheerful  [] Anxious  [x] Motivated  [] Overwhelmed    Treatment: NONE    Diabetes    [] Yes  [x] No    Stress    Source: SOMETIMES MY   Relaxation techniques: PUZZLES  Hobbies: NONE    Level of Education    [] 8th Grade  [] Associates  [] Masters  [x] High School [] Bachelor  []  Other:    Depression Screening:  PHQ-9 SCORE:      Cardiac Rehab Pre - Test  1. The heart is a muscle that acts like a pump to deliver oxygen and blood to the rest of the body. [x] True   [] False  2. Healing from the damage of a heart attach is complete in two weeks. [] True   [x] False  3. Smoking has no direct effect on the heart - it only effects your lungs. [] True   [x] False  4. Using all the salt you want is acceptable for all heart patients. [] True   [x] False  5. Chest pain that is relieved by rest or nitroglycerine is called Angina. [x] True   [] False  6. Shortness of breath, indigestion, sweating, tightness or pain in your chest are symptoms of a heart attack. [x] True   [] False  7. Swelling of the feet and ankles only means you've been on your feet too much. [] True   [x] False  8. Saturated fats raised your blood cholesterol level more than anything else in your diet. [x] True   [] False  9. High Blood pressure can take care of itself by rest alone. [] True   [x] False  10. Walking is one of the best exercises for heart attack patients. [x] True   [] False  SCORE:  100%    Physical Findings  Weight:164 LB  Height: 61 IN (2.40 M2)  BMI: 31.1  Cardiovascular- No edema, S1-S2 and apically regular to auscultation, strong bilateral upper and lower extremity pulses at +2, no carotid bruits.   Monitor rhythm-SINUS RHYTHM  VR- 65  WA- 0.18  QRS-0.06  QT- 0.34        Ejection Fraction- 58%  Pulmonary- Clear to auscultation bilaterally through out. Neurological- No deficit noted or reported. Peripheral Vascular- No deficit noted or reported. Muscular Skeletal- [x] Back Pain  Where? OCC CHRONIC-INTERMITTENT \"ACHE\"   [x] Joint discomfort Where? OCC CHRONIC-INTERMITTENT \"ACHE\" [facility]   Pain Assessment- DENIES AT PRESENT  Pain Level Tolerable <4+/10 on 10 point Likert scale  Location/ radiation/ Frequency/ Duration-  [x] Back Pain  Where? OCC CHRONIC-INTERMITTENT \"ACHE\"   [x] Joint discomfort Where? OCC CHRONIC-INTERMITTENT \"ACHE\" [facility]  Endocrine- No deficit noted or reported. Gastrointestinal- No deficit noted or reported. Genitourinary- No deficit noted or reported. Physical limitations- only as per above     Goals: To increase stamina, strength, and flexibility by exercising 31-50 total minutes by engaging in aerobic, resistance, and flexibility workout modalities with the goal of progressively achieving at least 0.5 to 1.0 metabolic equivalant improvement in the next 30 days as evidenced by daily session reports / INITIAL AVG CARDIO FC IS    To achieve and progress prescribed exercise frequency, intensity, time, and type in the next 30 days based upon initial evaluation and submaximal graded exercise results as evidenced by the attaining and maintaining the prescribed target heart rate range, a Reza rating of perceived exertion between 11 and 16, duration of >30 - 50 minutes using multiple exercise modes for at least 3 - 5 days per week to accumulate a minimum total of 2.5 hours per week of moderate aerobic intensity exercise, as tolerated, evidenced by daily session reports and home workout log. To gradually and progressively lose 2 - 4 lb of body weight in the next 30 days through moderating nutrional intake and performing regular aerobic and strength training exercises as prescribed with improvement evidenced by daily session report comparison.     To decrease waist circumference by 5% by program completion if waist measurement is > or = 40 inches (male) / > or = 35 inches (female) as evidence by the [place]. To introduce and progress 8-10 different bilateral UE and LE progressive resistance exercises focused on major muscle groups using 1-3 sets each per lift, on 2-3 non-consecutive days implementing free and machine weights and ORANGE therabands, as appropriate, with a resistance of 40-60% 1-repetition maximum or 10 -15 repetitions to progressive overload and increasing resistance once repetitions have progressed to 15 reps and feel fairly light on 2 prior occasions in the next 30 days. To achieve and maintain an optimal average resting blood pressure of <130 / 80 mmHg, or as indicated by this patient's referring provider, in the next 30 days. To strive for blood lipid optimization with an LDL-C of <100 mg/dL or  LDL 70 mg/dL, an HDL-C of > or = 40 mg/dL for men and > or = 50 mg/dL for women, and a triglyceride level of <150 mg/dL via lifestyle education, behavioral modification, and medication compliance.     -To develop regular home aerobic exercise program for 20 - 60 minutes at least 2 non-rehab days per week, excluding  5 - 10 minutes warm-up and cool-down periods, within the next 30 days, being tracked on home workout log. To strive for a lower daily fasting blood glucose measures to <131 and random after meal measures to <181, if diabetic, at home via lifestyle educaton, behavior modification, and medication compliance in the next 30 days as tracked per patient's self-report. To reduce self-reported psycho-social feelings of stress in the next 30 days as evidenced by pre- and post- surveys and by routine rounding with patient to ascertain subjective improvements.     In the next 30-days, to eat on average at least 2 servings of fruit per day and 4-5 servings of vegetables per day as evidenced by pre- and post-program nutriton survey and routine rounding with patient to ascertain progression toward goal per patient's self report, food diary, and Rate-your-Plate screening survey. To strive to eat < or = 30% of daily caloric intake of total fat and <10% of daily caloric saturated fat tracked by patient's self-report, food diary, and Rate-your-Plate screening survey. To have patient demonstrate knowledge about risk factor reduction, lifestyle modification, and heart health strategies with > / = 80% accuracy via pre- and post-program Test your [address] screening tool.     Electronically signed by Carri Zambrano RN on 11/18/21 at 2:24 PM EST

## 2021-11-18 NOTE — PROGRESS NOTES
Cardiac Rehabilitation   Physician Order Lorelei Huitron [facility]  1945  [id number]  11/18/2021    [x] Phase 2 ECG Monitored Cardiac Rehabilitation    [x] MI   [] PTCA with/without stents  [] CABG  [] Heart Valve Repair/Replaced   [] Stable Angina [] Other:     Onset Date: 11/9/21                    Cardiac Education Goals: (see individualized treatment plan for specific goals, progression & compliance)    [x] Hypertension [x] Physical Inactivity  [x] A & P  [] Smoking  [x] Coping/Depression  [x] Medications  [] Diabetes  [x] Obesity   [x] Angina  [x] Hyperlipidemia [x] Stress   [x] Home Exercise                     Prescribed Exercise Plan:    Target Hr: 96 - 108     Duration: 31 - 60 Minutes  Frequency: 2 Days per week  Initial Met Level: 3.2  Limitations: [x] Back Pain  Where? OCC CHRONIC-INTERMITTENT \"ACHE\"   [x] Joint discomfort Where? OCC CHRONIC-INTERMITTENT \"ACHE\" [facility]     Modalities:  [x]Treadmill   [x] UBE  [x] Seated Stepper  [x] Rowing Machine  [x] Weights/therabands  [] Elliptical    · Aerobic exercise to total 31-60 minutes. Progressing by 1-2 minutes per week and/or 1-2 levels per week per patient tolerance using various modalities; according to Reza Scale 12-16 and THR  · Introduce 8-12 bilateral UE and LE progressive resistance exercises at 1-3 sets per lift, on 2-3 non-consecutive days using weights/ ORANGE  therabands AND WTS TO 8-24 # for 8-15 reps to progressive overload by increasing resistance once reps progressed to at least 15 reps on at least 2 occasions                 Per patient symptoms use:  · Appropriate ACLS Algorhythm for Cardiac Events. · Nitroglycerine 0.4mg SLq 5mins X 3 for angina pain. · 12 lead EKG for c/o chest pain or change in rhythm. · Nasal O2 for SaO2 <90% or symptoms warranted. · Blood sugar monitoring for Hyper/Hypoglycemia symptoms.     Electronically signed by Kathryn Pelayo RN on 11/18/21 at 2:36 PM EST  Cardiac Rehab Staff

## 2021-11-18 NOTE — PROGRESS NOTES
Phase II Cardiac Rehab Individualized Treatment Plan-Initial     Patient Name: Altagracia Rodriguez  ACCOUNT #: [de-identified]  Date of Initial Assessment: 11/18/2021  Diagnosis: myocardial infarction  Onset Date: 11/9/21  Referring Physician: DR. Valentin Rajput  Risk Stratification: HIGH  Session Number: 1   EXERCISE    Stages of Change:   [] pre-contemplation  [x] Action   [] Contemplate   [] Maintainence   [x] Prep   [] Relapse          Exercise Prescription:  Mode: [x] TM [x] UBE [x] STP [] EL [x] R  Frequency: 2 DAYS PER WEEK  Duration: 31-60 MINUTES  Intensity: 3.3 - 4.0 METS  Target HR   Progression: INCREASE DURATION PER ABOVE F.I.T.T. Rx  PARAMETERS ON AVG OF 5-10 MIN / 1-2 WEEKS FOR THE FIRST 4-6 WEEKS. AFTER 3-4 WEEKS ARE COMPLETED, CONTINUE TO GRADUALLY INCREASE F. I.T. PARAMETERS GRADUALLY UPWARD AT THE ESTABLISHED DURATION ON AVERAGE 0.5-1.0 METS PER 30 DAYS OVER THE COARSE OF REMAINING PROGRAM AS ESTABLISHED BY PT-CENTERED GOALS AND GUIDELINES. Plan/Goal: Increase 1-2 levels/week or 1-2 min/week to achieve target HR and RPE   12-16.    [] Angina with Exertion THR:    [x] Resistance Training  Introduce AND PROGRESS 8-12 bilateral UE and LE progressive resistance exercises at 1-3 sets per lift, on 2-3 non-consecutive days using weights/ ORANGE therabands AND WTS TO 8-24 # for 8-15 reps to progressive overload by increasing resistance once reps progressed to at least 15 reps on at least 2 occasions     Hypertension:  [x] Yes  [] No  Resting BP: 140/894  Peak Exercise BP: 168/90  BP Meds: TOPROL XL 50 MG, BID / HYDROCHLOROTHIAZIDE 25 MG, MG    Intervention:  Home Exercise:  Type: WALKING  Duration: 20 - 60 MIN  Frequency: AT LEAST 2 NO REHAB DAYS PER WEEK   [x] Resistance Training   introduce 8-12 bilateral UE and LE progressive resistance exercises at 1-3 sets per lift, on 2-3 non-consecutive days using weights/ ORANGE therabands AND WTS TO 8-24 # for 8-15 reps to progressive overload by increasing resistance once reps progressed to at least 15 reps on at least 2 occasions     Education:   [x] Equipment [place]  [x] Self pulse   [x] Proper use weights/therabands   [x] S/S to report  [x] Low Na Diet    [x] Warm up/ Cool down  [x] BP Medication    [x]RPE Scale   [x] Understand BP   [x] Ex Safety   [x] Exercise specialist class-Home Exercise       Target Goal:   -Individual Exercise Plan  -BP<130/80  -Aerobic active 30 + minutes 5-7 days per week    Nutrition    Stages of Change:   [] pre-contemplation  [x] Action   [] Contemplate   [] Maintainence   [x] Prep   [] Relapse    Lipids:    Total Cholesterol: 137  Triglycerides: 101  HDL: 42  LDL: 75  Lipid Meds: ATORVASTATIN 80 MG, qHS    Diabetes:  [] Yes  [x] No    Weight Management:  Weight:164 LB  Height: 61 IN (2.40 M2)  BMI: 31.1  Wt Goal: 1-2 lbs/wk / LOSS  Alcohol:   Social History     Substance and Sexual Activity   Alcohol Use No       Diet Assessment Tool: RATE MY PLATE IN-PROCESS  Special Diet: Special Diet:  1,600 Kcal / day, 2 GM Na+, 30% total fat, <10% saturated fat, 25-35 GM fiber, cholesterol reduced, balanced nutrition plan to be promoted and taught in rehab      Intervention:   [] Dietitian Consult       [x] Nurse/Patient Discussion     [x] Diet Class           [] Referred to Diabetes Education     Education:  [] S&S hypo/hyperglycemia  [x] Low fat/low cholesterol diet  [x] Weight loss methods      [] Relate Diabetes/CAD     [x] Eating heart healthy handout    Target Goal:  -LDL-C<100 if triglycerides are > 200  -LDL-C < 70 for high risk patients  -HbA1c < 7%  -BMI < 25   Education    Stages of Change:    [] pre-contemplation  [x] Action   [] Contemplate   [] Maintainence   [x] Prep   [] Relapse    Learning Barriers:   [] Speech   [] Cognitive   [] Literacy   [] Visions   [] Hearing    [x] Ready Learn    Knowledge test score: 100%      Family support: [x] Yes  [] No    Tobacco use:   Social History     Tobacco Use   Smoking Status Never Smoker   Smokeless Tobacco Never Used         Intervention:  [] Referred to smoking cessation counselor     [] Individual education and counseling  [] Tobacco adjunct  [] Informed of education class schedule     Education:   [x] Risk Factors/Modifications  [x] Psychological aspects  [x] Angina    [x] Medications  [] CHF      [x] Cardiac A&P    Target Goal:  -Complete cessation of tobacco use (if applicable)  -Continued risk factor modifications  -Recognizing signs/symptoms to report  -Proper use of meds    Psychosocial  Stages of Change:    [] pre-contemplation  [x] Action   [] Contemplate   [] Maintainence   [x] Prep   [] Relapse    Psychosocial Test:  Tool Used: Priyank Villatoro Quality of Life  Score: IN-PROCESS  Depression screening score PHQ-9: IN-PROCESS    Intervention:   [] Psych Consult/  [x] Uses stress management skills    [] Physician Referral    [x] Stress management class  Medications:     Education:    [x] Coping Techniques   [x] Relaxation techniques   [x] S/S of Depression    Target Goal:  -Assess presence or absence of depression using a valid screening tool. -Maximize coping skills.  -Positive support system. Preventative Medication:   [x] Aspirin       [] Beta Blockade      [x] Statin or other lipid lowering agent     [x] Clopidogrel   [] ACE Inhibitor   [] Other anticoagulation medications     Fall Risk assess: [x] Yes  [] No / FALL FALL RISK  Assistive Device:  [] Cane  [] [place] [employer] [] Wheel Chair  [] Gait belt    Patient/Program goal:   To increase stamina, strength, and flexibility by exercising 31-50 total minutes by engaging in aerobic, resistance, and flexibility workout modalities with the goal of progressively achieving at least 0.5 to 1.0 metabolic equivalant improvement in the next 30 days as evidenced by daily session reports.     To achieve and progress prescribed exercise frequency, intensity, time, and type in the next 30 days based upon initial evaluation and submaximal graded exercise results next 30 days, being tracked on home workout log. To reduce self-reported psycho-social feelings of stress in the next 30 days as evidenced by pre- and post- surveys and by routine rounding with patient to ascertain subjective improvements. In the next 30-days, to eat on average at least 2 servings of fruit per day and 4-5 servings of vegetables per day as evidenced by pre- and post-program nutriton survey and routine rounding with patient to ascertain progression toward goal per patient's self report, food diary, and Rate-your-Plate screening survey. To strive to eat < or = 30% of daily caloric intake of total fat and <10% of daily caloric saturated fat tracked by patient's self-report, food diary, and Rate-your-Plate screening survey. To have patient demonstrate knowledge about risk factor reduction, lifestyle modification, and heart health strategies with > / = 80% accuracy via pre- and post-program Test your [address] screening tool.     Physician Changes/Comments:    Electronically signed by Tio Gillespie RN on 11/18/21 at 2:45 PM EST  Cardiopulmonary Rehab Staff

## 2021-11-22 NOTE — PROGRESS NOTES
Hernan Stein M.D. [address] [place] [address]  [phone]        2021        Carlos Howard MD  [address] [address]    RE:   Huy Avila [facility]  :  1945    Dear Dr. Wilian Fry:    CHIEF COMPLAINT:  Status post myocardial infarction on 11/10/2021, with catheterization showing spontaneous dissection of the distal portion of the first OM with normal LAD and circumflex and normal LV function. HISTORY OF PRESENT ILLNESS:  I had the pleasure of seeing Mrs. Alexandra in the office on 2021. I trust you received my full H and P from 10/27/2021. I saw for routine exam and at that time, she was doing well. She had no complaints. I stopped diltiazem and started Lopressor 25 mg b.i.d. because of mild tachycardia with heart rates in the 90 range. She had SVT on the event recorder on 07/15/2021, which was better after starting Cardizem. She came to the emergency room after sudden onset of chest pain on 2021. Her enzymes were positive and she was transferred to [facility] in [place]. She had a catheterization by Dr. Avani Moore. The catheterization showed a spontaneous dissection of her first OM (SCAD). Fortunately, she has no LV dysfunction. She was discharged. Her hydrochlorothiazide had been stopped in the hospital and she was placed on Lipitor 80 mg daily. She had been on Pravachol. When I see her today, she is doing well. She has had no chest pain, chest discomfort, or any unusual shortness of breath. No PND, orthopnea, or pedal edema. Her hydrochlorothiazide had been stopped in the hospital, it was restarted again today by Dr. Wilian Fry.     MEDICATIONS:  Her medications today are aspirin 81 mg daily, Lipitor 80 mg daily, Plavix 75 mg daily, HydroDIURIL 25 mg daily, Toprol-XL 50 mg b.i.d., and vitamin D 1000 units daily,    PHYSICAL EXAMINATION:  VITAL SIGNS:  Her blood pressure was 175/80 with a heart rate of 80 and regular. Respiratory rate 18. O2 sat 94%. Weight 164 pounds  GENERAL:  She is a very pleasant 58-year-old female. Denied pain. She was oriented to person, place and time. Answered questions appropriately. SKIN:  No unusual skin changes. HEENT:  The pupils are equally round and intact. Mucous membranes were dry. NECK:  No JVD. Good carotid pulses. No carotid bruits. No lymphadenopathy or thyromegaly. CARDIOVASCULAR EXAM:  S1 and S2 were normal.  No S3 or S4. Soft systolic blowing type murmur. No diastolic murmur. PMI was normal.  No lift, thrust, or pericardial friction rub. LUNGS:  Clear to auscultation and percussion. ABDOMEN:  Soft and nontender. Good bowel sounds. EXTREMITIES:  Good femoral pulses. Good pedal pulses. No pedal edema. Skin was warm and dry. No calf tenderness. Nail beds pink. Good cap refill. PULSES:  Bilateral symmetrical radial, brachial and carotid pulses. No carotid bruits. Good femoral and pedal pulses. NEUROLOGIC EXAM:  Within normal limits. PSYCHIATRIC EXAM:  Within normal limits. LABORATORY DATA:  Her sodium was 141, potassium 4.2, BUN 11, creatinine 0.60, GFR greater than 60. Glucose 107, calcium was 9.6. Her white count was 6.1, hemoglobin 15.7 with a platelet count 237,474. Her troponin on 11/09, was 240. EKG showed sinus rhythm and was normal in the emergency room on 11/09. IMPRESSION:  1. 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 Patricia Verdin showing dissection in the distal portion of the first OM with normal right coronary artery and LAD, normal LV function. 2.  Hypertension. 3.  Hyperlipidemia. 4.  History of SVT. PLAN:  1. Start cardiac rehab where we can monitor her rhythm and her blood pressure. 2.  We will see her again in 3 months for full evaluation. DISCUSSION:  Mrs. Spenser Taylor had sudden coronary artery dissection of her circumflex on 11/09.   She had, of course, no warning signs and no angina prior to the dissection. As you well know, these usually heal spontaneously with no intervention needed. She also had no coronary artery disease in any of the three coronary arteries. We will place in cardiac rehab to further monitor her rhythm and her blood pressure. Her statin was changed from pravastatin to Lipitor. She looks good and I expect that she will do well long-term. We will place her in cardiac rehab and I will see her in 3 months in followup. Thank you very much for allowing me the privilege of seeing Mrs. Alexandra. If you have any questions on my thoughts, please do not hesitate to contact me.     Sincerely,        Benjamín Frias    D: 11/17/2021 22:37:22     T: 11/17/2021 22:41:46     [email or web address]  Job#: [id number]   Doc#: [id number]

## 2021-11-23 ENCOUNTER — HOSPITAL ENCOUNTER (OUTPATIENT)
Dept: CARDIAC REHAB | Age: 76
Setting detail: THERAPIES SERIES
Discharge: HOME | End: 2021-11-23
Payer: MEDICARE

## 2021-11-23 PROCEDURE — 93798 PHYS/QHP OP CAR RHAB W/ECG: CPT

## 2021-11-30 ENCOUNTER — HOSPITAL ENCOUNTER (OUTPATIENT)
Dept: CARDIAC REHAB | Age: 76
Setting detail: THERAPIES SERIES
Discharge: HOME | End: 2021-11-30
Payer: MEDICARE

## 2021-11-30 PROCEDURE — 93798 PHYS/QHP OP CAR RHAB W/ECG: CPT

## 2021-11-30 RX ORDER — METOPROLOL SUCCINATE 50 MG/1
50 TABLET, EXTENDED RELEASE ORAL 2 TIMES DAILY
Qty: 60 TABLET | Refills: 5 | Status: SHIPPED | OUTPATIENT
Start: 2021-11-30 | End: 2022-05-09

## 2021-11-30 RX ORDER — CLOPIDOGREL BISULFATE 75 MG/1
75 TABLET ORAL DAILY
Qty: 30 TABLET | Refills: 5 | Status: SHIPPED | OUTPATIENT
Start: 2021-11-30 | End: 2022-05-09

## 2021-12-02 ENCOUNTER — HOSPITAL ENCOUNTER (OUTPATIENT)
Dept: CARDIAC REHAB | Age: 76
Setting detail: THERAPIES SERIES
Discharge: HOME | End: 2021-12-02
Payer: MEDICARE

## 2021-12-02 PROCEDURE — 93798 PHYS/QHP OP CAR RHAB W/ECG: CPT

## 2021-12-07 ENCOUNTER — HOSPITAL ENCOUNTER (OUTPATIENT)
Dept: CARDIAC REHAB | Age: 76
Setting detail: THERAPIES SERIES
Discharge: HOME | End: 2021-12-07
Payer: MEDICARE

## 2021-12-07 PROCEDURE — 93798 PHYS/QHP OP CAR RHAB W/ECG: CPT

## 2021-12-09 ENCOUNTER — HOSPITAL ENCOUNTER (OUTPATIENT)
Dept: CARDIAC REHAB | Age: 76
Setting detail: THERAPIES SERIES
Discharge: HOME | End: 2021-12-09
Payer: MEDICARE

## 2021-12-09 PROCEDURE — 93798 PHYS/QHP OP CAR RHAB W/ECG: CPT

## 2021-12-14 ENCOUNTER — HOSPITAL ENCOUNTER (OUTPATIENT)
Dept: CARDIAC REHAB | Age: 76
Setting detail: THERAPIES SERIES
Discharge: HOME | End: 2021-12-14
Payer: MEDICARE

## 2021-12-14 PROCEDURE — 93798 PHYS/QHP OP CAR RHAB W/ECG: CPT

## 2021-12-16 ENCOUNTER — HOSPITAL ENCOUNTER (OUTPATIENT)
Dept: CARDIAC REHAB | Age: 76
Setting detail: THERAPIES SERIES
Discharge: HOME | End: 2021-12-16
Payer: MEDICARE

## 2021-12-16 PROCEDURE — 93798 PHYS/QHP OP CAR RHAB W/ECG: CPT

## 2021-12-21 ENCOUNTER — HOSPITAL ENCOUNTER (OUTPATIENT)
Dept: CARDIAC REHAB | Age: 76
Setting detail: THERAPIES SERIES
Discharge: HOME | End: 2021-12-21
Payer: MEDICARE

## 2021-12-21 PROCEDURE — 93798 PHYS/QHP OP CAR RHAB W/ECG: CPT

## 2021-12-23 ENCOUNTER — HOSPITAL ENCOUNTER (OUTPATIENT)
Dept: CARDIAC REHAB | Age: 76
Setting detail: THERAPIES SERIES
Discharge: HOME | End: 2021-12-23
Payer: MEDICARE

## 2021-12-23 PROCEDURE — 93798 PHYS/QHP OP CAR RHAB W/ECG: CPT

## 2021-12-28 ENCOUNTER — HOSPITAL ENCOUNTER (OUTPATIENT)
Dept: CARDIAC REHAB | Age: 76
Setting detail: THERAPIES SERIES
Discharge: HOME | End: 2021-12-28
Payer: MEDICARE

## 2021-12-28 PROCEDURE — 93798 PHYS/QHP OP CAR RHAB W/ECG: CPT

## 2021-12-30 ENCOUNTER — HOSPITAL ENCOUNTER (OUTPATIENT)
Dept: CARDIAC REHAB | Age: 76
Setting detail: THERAPIES SERIES
Discharge: HOME | End: 2021-12-30
Payer: MEDICARE

## 2021-12-30 PROCEDURE — 93798 PHYS/QHP OP CAR RHAB W/ECG: CPT

## 2022-01-04 ENCOUNTER — HOSPITAL ENCOUNTER (OUTPATIENT)
Dept: CARDIAC REHAB | Age: 77
Setting detail: THERAPIES SERIES
Discharge: HOME | End: 2022-01-04
Payer: MEDICARE

## 2022-01-04 PROCEDURE — 93798 PHYS/QHP OP CAR RHAB W/ECG: CPT

## 2022-01-06 ENCOUNTER — HOSPITAL ENCOUNTER (OUTPATIENT)
Dept: CARDIAC REHAB | Age: 77
Setting detail: THERAPIES SERIES
Discharge: HOME | End: 2022-01-06
Payer: MEDICARE

## 2022-01-06 PROCEDURE — 93798 PHYS/QHP OP CAR RHAB W/ECG: CPT

## 2022-01-11 ENCOUNTER — HOSPITAL ENCOUNTER (OUTPATIENT)
Dept: CARDIAC REHAB | Age: 77
Setting detail: THERAPIES SERIES
Discharge: HOME | End: 2022-01-11
Payer: MEDICARE

## 2022-01-11 PROCEDURE — 93798 PHYS/QHP OP CAR RHAB W/ECG: CPT

## 2022-01-13 ENCOUNTER — HOSPITAL ENCOUNTER (OUTPATIENT)
Dept: CARDIAC REHAB | Age: 77
Setting detail: THERAPIES SERIES
Discharge: HOME | End: 2022-01-13
Payer: MEDICARE

## 2022-01-13 PROCEDURE — 93798 PHYS/QHP OP CAR RHAB W/ECG: CPT

## 2022-01-13 NOTE — PROGRESS NOTES
Phase II Cardiac Rehab Individualized Treatment Plan-60 Day      Patient Name: Odilon Friday #: [de-identified]   Date of Initial Assessment: 11/18/2021  Diagnosis: myocardial infarction  Onset Date: 11/9/21  Referring Physician: DR. Holley Cogan Risk [id number]  Session Number: 17    EXERCISE    Stages of Change:   [] pre-contemplation  [x] Action   [] Contemplate   [] Maintainence   [] Prep   [] Relapse          Exercise Prescription:  Mode: [x]? TM [x]? UBE [x]? STP []? EL [x]? R  Frequency: 3 DAYS PER WEEK  Duration: 31-60 MINUTES  Intensity: 3.8 - 4.6 AVG METS  Plan/Goal: Increase 1-2 levels/week or 1-2 min/week to achieve target HR and RPE   12-16. Progression: INCREASE DURATION PER ABOVE F.I.T.T. Rx  PARAMETERS ON AVG OF 5-10 MIN / 1-2 WEEKS FOR THE FIRST 4-6 WEEKS. AFTER 3-4 WEEKS ARE COMPLETED, CONTINUE TO GRADUALLY INCREASE F. I.T. PARAMETERS GRADUALLY UPWARD AT THE ESTABLISHED DURATION ON AVERAGE 0.5-1.0 METS PER 30 DAYS OVER THE COARSE OF REMAINING PROGRAM AS ESTABLISHED BY PT-CENTERED GOALS AND GUIDELINES. Target HR  BPM       []? Angina with Exertion            [x]? Resistance Training  PROGRESS 8-12 bilateral UE and LE progressive resistance exercises at 1-3 sets per lift, on 2-3 non-consecutive days using weights/ ORANGE  therabands AND WTS TO 8-24 # for 8-15 reps to progressive overload by increasing resistance once reps progressed to at least 15 reps on at least 2 occasions        Hypertension:  [x] Yes  [] No  Resting BP: 128/60  Peak Exercise BP: 144/76  [] Med change? NO    Intervention:  Home Exercise:  Type: WALKING  Duration: 30-60 MINUTES  Frequency: 2-3 DAYS PER WEEK   []?  Resistance Training  PROGRESS 8-12 bilateral UE and LE progressive resistance exercises at 1-3 sets per lift, on 2-3 non-consecutive days using weights/ ORANGE  therabands AND WTS TO 8-24 # for 8-15 reps to progressive overload by increasing resistance once reps progressed to at least 15 reps on at least 2 occasions     Education:   [x] Equipment Kennan  [x] Self pulse   [x] Proper use weights/therabands   [x] S/S to report  [x] Low Na Diet    [x] Warm up/ Cool down  [x] BP Medication    [x] RPE Scale   [x] Understand BP   [x] Ex Safety   [x] Exercise specialist class-Home Exercise       Target Goal:   -Individual Exercise Plan  -Bp<130/80  -Aerobic active 30 + minutes 5-7 days per week    Nutrition    Stages of Change:   [] pre-contemplation  [x] Action   [] Contemplate   [] Maintainence   [] Prep   [] Relapse    Lipids:   NO NEW VALUES  [] Med Change? NO    Diabetes / Prediabetes:  [] Yes  [x] No      Weight Management:  Current Wt 166.6  Wt Goal: 1-2 lbs/wk [x] Loss  [] Gain  [] Maintain Current Wt.      Intervention:   [] Dietitian Consult       [x] Nurse/Patient Discussion     [x] Diet Class           [] Referred to Diabetes Education     Education:  [] S&S hypo/hyperglycemia  [x] Low fat/low cholesterol diet  [x] Weight loss methods      [] Relate Diabetes/CAD     [x] Eating heart healthy handout    Target Goal:  -LDL-C<100 if triglycerides are > 200  -LDL-C < 70 for high risk patients  -HbA1c < 7%  -BMI < 25   Education    Stages of Change:    [] pre-contemplation  [x] Action   [] Contemplate   [] Maintainence   [] Prep   [] Relapse    Family support: [x] Yes  [] No    Tobacco use: [] Yes  [x] No    Intervention:  [] Referred to smoking cessation counselor     [] Individual education and counseling  [] Tobacco adjunct  [] Informed of education class schedule     Education:   [x] Risk Factors/Modifications  [x] Psychological aspects  [x] Angina         [x] Medications  [] CHF                                    [] Diabetes     [x] Cardiac A&P                                 [] Smoking Cessation  [x] Stress Reduction and Relaxation                     [] Weight Management    Target Goal:  -Complete cessation of tobacco use (if applicable)  -Continued risk factor modifications  -Recognizing signs/symptoms to report  -Proper use of meds    Psychosocial  Stages of Change:    [] pre-contemplation  [x] Action   [] Contemplate   [] Maintainence   [] Prep   [] Relapse      Intervention:   [] Psych Consult/  [x] Uses stress management skills    [] Physician Referral    [x] Stress management class   [] Med Change? Education:    [x] Coping Techniques   [x] Relaxation techniques   [x] S/S of Depression    Target Goal:  -Assess presence or absence of depression using a valid screening tool. -Maximize coping skills.  -Positive support system.     Preventative Medication:   [x] Aspirin       [] Beta Blockade      [] Statin or other lipid lowering agent     [x] Clopidogrel   [x] ACE Inhibitor   [] ACE Receptor Blocker   [] Antianginal    [] Calcium Channel Blocker   [] Other anticoagulation medications     Fall Risk assess: [x] Yes  [] No / REMAINS LOW RISK  Assistive Device:   [] Cane  [] Hallie  [] Wheel Chair  [] Gait belt    Patient/Program goal:   To increase stamina, strength, and flexibility by exercising 31-50 total minutes by engaging in aerobic, resistance, and flexibility workout modalities with the goal of progressively achieving at least 0.5 to 1.0 metabolic equivalant improvement in the next 30 days as evidenced by daily session reports. / Carmelo Rasmey / CAROLYN MET LEVEL INCREASED TO 4.0 METS / CONTINUE TO PROGRESS GOAL AS WRITTEN OVER THE NEXT 30 DAYS     To achieve and progress prescribed exercise frequency, intensity, time, and type in the next 30 days based upon initial evaluation and submaximal graded exercise results as evidenced by the attaining and maintaining the prescribed target heart rate range, a Reza rating of perceived exertion between 11 and 16, duration of >30 - 50 minutes using multiple exercise modes for at least 3 - 5 days per week to accumulate a minimum total of 2.5 hours per week of moderate aerobic intensity exercise, as tolerated, evidenced by daily session reports and home workout log. / [facility]. / CONTINUE TO PROGRESS GOAL AS WRITTEN OVER THE NEXT 30 DAYS      To gradually and progressively lose 2 - 4 lb of body weight in the next 30 days through moderating nutrional intake and performing regular aerobic and strength training exercises as prescribed with improvement evidenced by daily session report comparison. / NOT MEETING GOAL / WEIGHT REMAINS 155.6 LB / [place] NEXT 30 DAYS     To decrease waist circumference by 5% by program completion if waist measurement is > or = 40 inches (male) / > or = 35 inches (female) as evidence by the [employer]. / NO NEW VALUES     To introduce and progress 8-10 different bilateral UE and LE progressive resistance exercises focused on major muscle groups using 1-3 sets each per lift, on 2-3 non-consecutive days implementing free and machine weights and ORANGE therabands, as appropriate, with a resistance of 40-60% 1-repetition maximum or 10 -15 repetitions to progressive overload and increasing resistance once repetitions have progressed to 15 reps and feel fairly light on 2 prior occasions in the next 30 days. / PROGRESSING TOWARD GOAL / CONTINUE PROGRESSING GOAL AS WRITTEN THE NEXT 30 DAYS     To achieve and maintain an optimal average resting blood pressure of <130 / 80 mmHg, or as indicated by this patient's referring provider, in the next 30 days. / MEETING GOAL LAST 30 DAYS--CONTINUE PROGRESSING GOAL AS WRITTEN NEXT 30 DAYS     To strive for blood lipid optimization with an LDL-C of <100 mg/dL or  LDL 70 mg/dL, an HDL-C of > or = 40 mg/dL for men and > or = 50 mg/dL for women, and a triglyceride level of <150 mg/dL via lifestyle education, behavioral modification, and medication compliance. / NO NEW VALUES     -To develop regular home aerobic exercise program for 20 - 60 minutes at least 2 non-rehab days per week, excluding  5 - 10 minutes warm-up and cool-down periods, within the next 30 days, being tracked on home workout log./ [address] NEXT 30 DAYS     To reduce self-reported psycho-social feelings of stress in the next 30 days as evidenced by pre- and post- surveys and by routine rounding with patient to ascertain subjective improvements. / NO NEW SURVEYS     In the next 30-days, to eat on average at least 2 servings of fruit per day and 4-5 servings of vegetables per day as evidenced by pre- and post-program nutriton survey and routine rounding with patient to ascertain progression toward goal per patient's self report, food diary, and Rate-your-Plate screening survey.  NO NEW SURVEYS     To strive to eat < or = 30% of daily caloric intake of total fat and <10% of daily caloric saturated fat tracked by patient's self-report, food diary, and Rate-your-Plate screening survey. / [address] / [address] NEXT 30 DAYS     To have patient demonstrate knowledge about risk factor reduction, lifestyle modification, and heart health strategies with > / = 80% accuracy via pre- and post-program Test your Heart Health Knowledge screening tool. / NO NEW SURVEYS       Physician Changes/Comments:    Electronically signed by Barrett Anthony RN on 1/13/22 at 8:31 AM EST  Cardiac Rehab Staff

## 2022-01-18 ENCOUNTER — HOSPITAL ENCOUNTER (OUTPATIENT)
Dept: CARDIAC REHAB | Age: 77
Setting detail: THERAPIES SERIES
Discharge: HOME | End: 2022-01-18
Payer: MEDICARE

## 2022-01-18 PROCEDURE — 93798 PHYS/QHP OP CAR RHAB W/ECG: CPT

## 2022-01-20 ENCOUNTER — HOSPITAL ENCOUNTER (OUTPATIENT)
Dept: CARDIAC REHAB | Age: 77
Setting detail: THERAPIES SERIES
Discharge: HOME | End: 2022-01-20
Payer: MEDICARE

## 2022-01-20 PROCEDURE — 93798 PHYS/QHP OP CAR RHAB W/ECG: CPT

## 2022-01-25 ENCOUNTER — HOSPITAL ENCOUNTER (OUTPATIENT)
Dept: CARDIAC REHAB | Age: 77
Setting detail: THERAPIES SERIES
Discharge: HOME | End: 2022-01-25
Payer: MEDICARE

## 2022-01-25 PROCEDURE — 93798 PHYS/QHP OP CAR RHAB W/ECG: CPT

## 2022-01-27 ENCOUNTER — HOSPITAL ENCOUNTER (OUTPATIENT)
Dept: CARDIAC REHAB | Age: 77
Setting detail: THERAPIES SERIES
Discharge: HOME | End: 2022-01-27
Payer: MEDICARE

## 2022-01-27 PROCEDURE — 93798 PHYS/QHP OP CAR RHAB W/ECG: CPT

## 2022-02-01 ENCOUNTER — HOSPITAL ENCOUNTER (OUTPATIENT)
Dept: CARDIAC REHAB | Age: 77
Setting detail: THERAPIES SERIES
Discharge: HOME | End: 2022-02-01
Payer: MEDICARE

## 2022-02-01 PROCEDURE — 93798 PHYS/QHP OP CAR RHAB W/ECG: CPT

## 2022-02-03 ENCOUNTER — HOSPITAL ENCOUNTER (OUTPATIENT)
Dept: CARDIAC REHAB | Age: 77
Setting detail: THERAPIES SERIES
End: 2022-02-03
Payer: MEDICARE

## 2022-02-08 ENCOUNTER — HOSPITAL ENCOUNTER (OUTPATIENT)
Dept: CARDIAC REHAB | Age: 77
Setting detail: THERAPIES SERIES
Discharge: HOME | End: 2022-02-08
Payer: MEDICARE

## 2022-02-08 PROCEDURE — 93798 PHYS/QHP OP CAR RHAB W/ECG: CPT

## 2022-02-10 ENCOUNTER — HOSPITAL ENCOUNTER (OUTPATIENT)
Age: 77
Discharge: HOME | End: 2022-02-10
Payer: MEDICARE

## 2022-02-10 ENCOUNTER — HOSPITAL ENCOUNTER (OUTPATIENT)
Dept: CARDIAC REHAB | Age: 77
Setting detail: THERAPIES SERIES
Discharge: HOME | End: 2022-02-10
Payer: MEDICARE

## 2022-02-10 DIAGNOSIS — I10 ESSENTIAL HYPERTENSION, BENIGN: ICD-10-CM

## 2022-02-10 DIAGNOSIS — I25.10 CORONARY ARTERY DISEASE INVOLVING NATIVE HEART WITHOUT ANGINA PECTORIS, UNSPECIFIED VESSEL OR LESION TYPE: ICD-10-CM

## 2022-02-10 DIAGNOSIS — E78.5 HYPERLIPIDEMIA, UNSPECIFIED HYPERLIPIDEMIA TYPE: ICD-10-CM

## 2022-02-10 DIAGNOSIS — E55.9 VITAMIN D DEFICIENCY DISEASE: ICD-10-CM

## 2022-02-10 LAB
ABSOLUTE EOS #: 0.3 K/UL (ref 0–0.4)
ABSOLUTE IMMATURE GRANULOCYTE: ABNORMAL K/UL (ref 0–0.3)
ABSOLUTE LYMPH #: 1.3 K/UL (ref 1–4.8)
ABSOLUTE MONO #: 0.6 K/UL (ref 0–1)
ALBUMIN SERPL-MCNC: 4.1 G/DL (ref 3.5–5.2)
ALBUMIN/GLOBULIN RATIO: ABNORMAL (ref 1–2.5)
ALP BLD-CCNC: 61 U/L (ref 35–104)
ALT SERPL-CCNC: 38 U/L (ref 5–33)
ANION GAP SERPL CALCULATED.3IONS-SCNC: 9 MMOL/L (ref 9–17)
AST SERPL-CCNC: 37 U/L
BASOPHILS # BLD: 0 % (ref 0–2)
BASOPHILS ABSOLUTE: 0 K/UL (ref 0–0.2)
BILIRUB SERPL-MCNC: 1.97 MG/DL (ref 0.3–1.2)
BUN BLDV-MCNC: 12 MG/DL (ref 8–23)
BUN/CREAT BLD: 19 (ref 9–20)
CALCIUM SERPL-MCNC: 9.7 MG/DL (ref 8.6–10.4)
CHLORIDE BLD-SCNC: 104 MMOL/L (ref 98–107)
CHOLESTEROL/HDL RATIO: 2.2
CHOLESTEROL: 96 MG/DL
CO2: 31 MMOL/L (ref 20–31)
CREAT SERPL-MCNC: 0.62 MG/DL (ref 0.5–0.9)
DIFFERENTIAL TYPE: YES
EOSINOPHILS RELATIVE PERCENT: 5 % (ref 0–5)
GFR AFRICAN AMERICAN: >60 ML/MIN
GFR NON-AFRICAN AMERICAN: >60 ML/MIN
GFR SERPL CREATININE-BSD FRML MDRD: ABNORMAL ML/MIN/{1.73_M2}
GFR SERPL CREATININE-BSD FRML MDRD: ABNORMAL ML/MIN/{1.73_M2}
GLUCOSE BLD-MCNC: 106 MG/DL (ref 70–99)
HCT VFR BLD CALC: 43.5 % (ref 36–46)
HDLC SERPL-MCNC: 43 MG/DL
HEMOGLOBIN: 14.7 G/DL (ref 12–16)
IMMATURE GRANULOCYTES: ABNORMAL %
LDL CHOLESTEROL: 36 MG/DL (ref 0–130)
LYMPHOCYTES # BLD: 25 % (ref 15–40)
MAGNESIUM: 2.2 MG/DL (ref 1.6–2.6)
MCH RBC QN AUTO: 29.9 PG (ref 26–34)
MCHC RBC AUTO-ENTMCNC: 33.8 G/DL (ref 31–37)
MCV RBC AUTO: 88.4 FL (ref 80–100)
MONOCYTES # BLD: 11 % (ref 4–8)
NRBC AUTOMATED: ABNORMAL PER 100 WBC
PATIENT FASTING?: YES
PDW BLD-RTO: 13.4 % (ref 12.1–15.2)
PLATELET # BLD: 221 K/UL (ref 140–450)
PLATELET ESTIMATE: ABNORMAL
PMV BLD AUTO: ABNORMAL FL (ref 6–12)
POTASSIUM SERPL-SCNC: 3.9 MMOL/L (ref 3.7–5.3)
RBC # BLD: 4.92 M/UL (ref 4–5.2)
RBC # BLD: ABNORMAL 10*6/UL
SEG NEUTROPHILS: 59 % (ref 47–75)
SEGMENTED NEUTROPHILS ABSOLUTE COUNT: 3.1 K/UL (ref 2.5–7)
SODIUM BLD-SCNC: 144 MMOL/L (ref 135–144)
TOTAL PROTEIN: 7.1 G/DL (ref 6.4–8.3)
TRIGL SERPL-MCNC: 87 MG/DL
TSH SERPL DL<=0.05 MIU/L-ACNC: 1.92 MIU/L (ref 0.3–5)
VITAMIN D 25-HYDROXY: 36.6 NG/ML (ref 30–100)
VLDLC SERPL CALC-MCNC: NORMAL MG/DL (ref 1–30)
WBC # BLD: 5.2 K/UL (ref 3.5–11)
WBC # BLD: ABNORMAL 10*3/UL

## 2022-02-10 PROCEDURE — 83735 ASSAY OF MAGNESIUM: CPT

## 2022-02-10 PROCEDURE — 80061 LIPID PANEL: CPT

## 2022-02-10 PROCEDURE — 80053 COMPREHEN METABOLIC PANEL: CPT

## 2022-02-10 PROCEDURE — 85025 COMPLETE CBC W/AUTO DIFF WBC: CPT

## 2022-02-10 PROCEDURE — 84443 ASSAY THYROID STIM HORMONE: CPT

## 2022-02-10 PROCEDURE — 36415 COLL VENOUS BLD VENIPUNCTURE: CPT

## 2022-02-10 PROCEDURE — 82306 VITAMIN D 25 HYDROXY: CPT

## 2022-02-10 PROCEDURE — 93798 PHYS/QHP OP CAR RHAB W/ECG: CPT

## 2022-02-10 PROCEDURE — 93005 ELECTROCARDIOGRAM TRACING: CPT

## 2022-02-10 NOTE — PROGRESS NOTES
Phase II Cardiac Rehab Individualized Treatment Plan-90 Day      Patient Name: Jayden Greene #: [de-identified]   Date of Initial Assessment: 2/10/2022  Diagnosis: myocardial infarction  Onset Date: 11/9/21  Referring Physician: DR. Salomon Sue [address]  Session Number: 22    EXERCISE    Stages of Change:   [] pre-contemplation  [x] Action   [] Contemplate   [] Maintainence   [] Prep   [] Relapse          Exercise Prescription:  Mode: [x] TM [x] UBE [x] STP [] EL [x] R  Frequency: 3 DAYS PER WEEK  Duration: 31-60 MINUTES  Intensity: 4.3-5.0 METS  Plan/Goal: Increase 1-2 levels/week or 1-2 min/week to achieve target HR and RPE   12-16. Progression: INCREASE DURATION PER ABOVE F.I.T.T. Rx  PARAMETERS ON AVG OF 5-10 MIN / 1-2 WEEKS FOR THE FIRST 4-6 WEEKS. AFTER 3-4 WEEKS ARE COMPLETED, CONTINUE TO GRADUALLY INCREASE F. I.T. PARAMETERS GRADUALLY UPWARD AT THE ESTABLISHED DURATION ON AVERAGE 0.5-1.0 METS PER 30 DAYS OVER THE COARSE OF REMAINING PROGRAM AS ESTABLISHED BY PT-CENTERED GOALS AND GUIDELINES. Target  - 114     [] Angina with Exertion    [x] Resistance Training  Progress 8-12 bilateral UE and LE progressive resistance exercises at 1-3 sets per lift, on 2-3 non-consecutive days using weights/ GREEN  therabands AND WTS TO 8-24 # for 8-15 reps to progressive overload by increasing resistance once reps progressed to at least 15 reps on at least 2 occasions       Hypertension:  [x] Yes  [] No  Resting BP: 138/74  Peak Exercise BP: 146/72  [] Med change? NO    Intervention:  Home Exercise:  Type: WALKING  Duration: 30-60 MINUTES  Frequency: 2-3 DAYS PER WEEK   [x]? ? Resistance Training  PROGRESS 8-12 bilateral UE and LE progressive resistance exercises at 1-3 sets per lift, on 2-3 non-consecutive days using weights/ GREEN  therabands AND WTS TO 8-24 # for 8-15 reps to progressive overload by increasing resistance once reps progressed to at least 15 reps on at least 2 occasions Education:   [x] Equipment [address]  [x] Self pulse   [x] Proper use weights/therabands   [x] S/S to report  [x] Low Na Diet    [x] Warm up/ Cool down  [x] BP Medication    [x] RPE Scale   [x] Understand BP   [x] Ex Safety   [x] Exercise specialist class-Home Exercise       Target Goal:   -Individual Exercise Plan  -Bp<130/80  -Aerobic active 30 + minutes 5-7 days per week    Nutrition    Stages of Change:   [] pre-contemplation  [x] Action   [] Contemplate   [] Maintainence   [] Prep   [] Relapse    Lipids: NO NEW VALUES  [] Med Change? NO    Diabetes / Prediabetes:  [] Yes  [x] No    Weight Management:  Current Wt 164.4  Wt Goal: 1-2 lbs/wk [x] Loss  [] Gain  [] Maintain Current Wt.      Intervention:   [] Dietitian Consult       [x] Nurse/Patient Discussion     [x] Diet Class           [] Referred to Diabetes Education     Education:  [] S&S hypo/hyperglycemia  [x] Low fat/low cholesterol diet  [x] Weight loss methods      [] Relate Diabetes/CAD     [x] Eating heart healthy handout    Target Goal:  -LDL-C<100 if triglycerides are > 200  -LDL-C < 70 for high risk patients  -HbA1c < 7%  -BMI < 25   Education    Stages of Change:    [] pre-contemplation  [x] Action   [] Contemplate   [] Maintainence   [] Prep   [] Relapse    Family support: [x] Yes  [] No    Tobacco use: [] Yes  [x] No    Intervention:  [] Referred to smoking cessation counselor     [] Individual education and counseling  [] Tobacco adjunct  [] Informed of education class schedule     Education:   [x] Risk Factors/Modifications  [x] Psychological aspects  [x] Angina         [x] Medications  [] CHF                                    [] Diabetes     [x] Cardiac A&P                                 [] Smoking Cessation  [x] Stress Reduction and Relaxation                     [] Weight Management    Target Goal:  -Complete cessation of tobacco use (if applicable)  -Continued risk factor modifications  -Recognizing signs/symptoms to report  -Proper use of meds    Psychosocial  Stages of Change:    [] pre-contemplation  [x] Action   [] Contemplate   [] Maintainence   [] Prep   [] Relapse      Intervention:   [] Psych Consult/  [x] Uses stress management skills    [] Physician Referral    [x] Stress management class   [] Med Change? Education:    [x] Coping Techniques   [x] Relaxation techniques   [x] S/S of Depression    Target Goal:  -Assess presence or absence of depression using a valid screening tool. -Maximize coping skills.  -Positive support system.     Preventative Medication:   [x] Aspirin       [] Beta Blockade      [] Statin or other lipid lowering agent     [x] Clopidogrel   [x] ACE Inhibitor   [] ACE Receptor Blocker   [] Antianginal    [] Calcium Channel Blocker   [] Other anticoagulation medications     Fall Risk assess: [x] Yes  [] No / REMAINS LOW RISK  Assistive Device:   [] Cane  [] Benetta Dasen [] Wheel Chair  [] Gait belt    Patient/Program goal:   To increase stamina, strength, and flexibility by exercising 31-50 total minutes by engaging in aerobic, resistance, and flexibility workout modalities with the goal of progressively achieving at least 0.5 to 1.0 metabolic equivalant improvement in the next 30 days as evidenced by daily session reports. / MEETING GOAL / AVG MET LEVEL INCREASED TO 4.0 METS / CONTINUE TO PROGRESS GOAL AS WRITTEN OVER THE NEXT 30 DAYS     To achieve and progress prescribed exercise frequency, intensity, time, and type in the next 30 days based upon initial evaluation and submaximal graded exercise results as evidenced by the attaining and maintaining the prescribed target heart rate range, a Reza rating of perceived exertion between 11 and 16, duration of >30 - 50 minutes using multiple exercise modes for at least 3 - 5 days per week to accumulate a minimum total of 2.5 hours per week of moderate aerobic intensity exercise, as tolerated, evidenced by daily session reports and home workout log. / MEETING GOAL IN REHAB W/ SOME HOME EXERCISE AS WELL / CONTINUE TO PROGRESS GOAL AS WRITTEN OVER THE NEXT 30 DAYS      To gradually and progressively lose 2 - 4 lb of body weight in the next 30 days through moderating nutrional intake and performing regular aerobic and strength training exercises as prescribed with improvement evidenced by daily session report comparison. / NOT MEETING GOAL / WEIGHT INCREASES FROM 155.6 LB .4 / CONTINUE TO PROGRESS GOAL AS WRITTEN OVER THE NEXT 30 DAYS     To decrease waist circumference by 5% by program completion if waist measurement is > or = 40 inches (male) / > or = 35 inches (female) as evidence by the [employer]. / NO NEW VALUES     To introduce and progress 8-10 different bilateral UE and LE progressive resistance exercises focused on major muscle groups using 1-3 sets each per lift, on 2-3 non-consecutive days implementing free and machine weights and ORANGE therabands, as appropriate, with a resistance of 40-60% 1-repetition maximum or 10 -15 repetitions to progressive overload and increasing resistance once repetitions have progressed to 15 reps and feel fairly light on 2 prior occasions in the next 30 days. / PROGRESSING TOWARD GOAL / CONTINUE PROGRESSING GOAL AS WRITTEN THE NEXT 30 DAYS     To achieve and maintain an optimal average resting blood pressure of <130 / 80 mmHg, or as indicated by this patient's referring provider, in the next 30 days. / MEETING GOAL LAST 30 DAYS--CONTINUE PROGRESSING GOAL AS WRITTEN NEXT 30 DAYS     To strive for blood lipid optimization with an LDL-C of <100 mg/dL or  LDL 70 mg/dL, an HDL-C of > or = 40 mg/dL for men and > or = 50 mg/dL for women, and a triglyceride level of <150 mg/dL via lifestyle education, behavioral modification, and medication compliance. / NO NEW VALUES / NEW LABS IN-PROCESS LATER THIS WEEK     -To develop regular home aerobic exercise program for 20 - 60 minutes at least 2 non-rehab days per week, excluding  5 - 10 minutes warm-up and cool-down periods, within the next 30 days, being tracked on home workout log./ MEETING GOAL PER PT / [address] NEXT 30 DAYS     To reduce self-reported psycho-social feelings of stress in the next 30 days as evidenced by pre- and post- surveys and by routine rounding with patient to ascertain subjective improvements. / NO NEW SURVEYS     In the next 30-days, to eat on average at least 2 servings of fruit per day and 4-5 servings of vegetables per day as evidenced by pre- and post-program nutriton survey and routine rounding with patient to ascertain progression toward goal per patient's self report, food diary, and Rate-your-Plate screening survey. NO NEW SURVEYS     To strive to eat < or = 30% of daily caloric intake of total fat and <10% of daily caloric saturated fat tracked by patient's self-report, food diary, and Rate-your-Plate screening survey. / MEETING GOAL PER PT / [address] NEXT 30 DAYS     To have patient demonstrate knowledge about risk factor reduction, lifestyle modification, and heart health strategies with > / = 80% accuracy via pre- and post-program Test your Lizette 17 tool. / NO NEW SURVEYS    Physician Changes/Comments:    Electronically signed by Kimmy Guardado RN on 2/10/22 at 3:11 PM EST  Cardiac Rehab Staff

## 2022-02-11 DIAGNOSIS — I10 ESSENTIAL HYPERTENSION, BENIGN: ICD-10-CM

## 2022-02-11 RX ORDER — HYDROCHLOROTHIAZIDE 25 MG/1
TABLET ORAL
Qty: 30 TABLET | Refills: 5 | Status: SHIPPED | OUTPATIENT
Start: 2022-02-11 | End: 2022-08-03 | Stop reason: SDUPTHER

## 2022-02-11 NOTE — TELEPHONE ENCOUNTER
Last OV: 11/17/2021 [facility] follow up   Last RX:   Next scheduled apt: 3/1/2022        Sure scripts request      RX pending

## 2022-02-13 LAB
EKG ATRIAL RATE: 75 BPM
EKG P AXIS: 62 DEGREES
EKG P-R INTERVAL: 200 MS
EKG Q-T INTERVAL: 378 MS
EKG QRS DURATION: 80 MS
EKG QTC CALCULATION (BAZETT): 422 MS
EKG R AXIS: 54 DEGREES
EKG T AXIS: 55 DEGREES
EKG VENTRICULAR RATE: 75 BPM

## 2022-02-13 PROCEDURE — 93010 ELECTROCARDIOGRAM REPORT: CPT | Performed by: INTERNAL MEDICINE

## 2022-02-14 ENCOUNTER — HOSPITAL ENCOUNTER (OUTPATIENT)
Dept: CARDIAC REHAB | Age: 77
Discharge: HOME | End: 2022-02-14

## 2022-02-14 PROCEDURE — 93798 PHYS/QHP OP CAR RHAB W/ECG: CPT

## 2022-02-15 ENCOUNTER — HOSPITAL ENCOUNTER (OUTPATIENT)
Dept: CARDIAC REHAB | Age: 77
Setting detail: THERAPIES SERIES
End: 2022-02-15
Payer: MEDICARE

## 2022-02-17 ENCOUNTER — HOSPITAL ENCOUNTER (OUTPATIENT)
Dept: CARDIAC REHAB | Age: 77
Setting detail: THERAPIES SERIES
Discharge: HOME | End: 2022-02-17
Payer: MEDICARE

## 2022-02-17 ENCOUNTER — OFFICE VISIT (OUTPATIENT)
Dept: CARDIOLOGY CLINIC | Age: 77
End: 2022-02-17
Payer: MEDICARE

## 2022-02-17 VITALS
WEIGHT: 166 LBS | BODY MASS INDEX: 31.37 KG/M2 | HEART RATE: 70 BPM | SYSTOLIC BLOOD PRESSURE: 170 MMHG | OXYGEN SATURATION: 95 % | DIASTOLIC BLOOD PRESSURE: 100 MMHG

## 2022-02-17 DIAGNOSIS — I25.10 CORONARY ARTERY DISEASE INVOLVING NATIVE HEART WITHOUT ANGINA PECTORIS, UNSPECIFIED VESSEL OR LESION TYPE: ICD-10-CM

## 2022-02-17 DIAGNOSIS — E55.9 VITAMIN D DEFICIENCY DISEASE: ICD-10-CM

## 2022-02-17 DIAGNOSIS — I10 ESSENTIAL HYPERTENSION, BENIGN: Primary | ICD-10-CM

## 2022-02-17 DIAGNOSIS — E78.5 HYPERLIPIDEMIA, UNSPECIFIED HYPERLIPIDEMIA TYPE: ICD-10-CM

## 2022-02-17 PROCEDURE — G8400 PT W/DXA NO RESULTS DOC: HCPCS | Performed by: INTERNAL MEDICINE

## 2022-02-17 PROCEDURE — G8417 CALC BMI ABV UP PARAM F/U: HCPCS | Performed by: INTERNAL MEDICINE

## 2022-02-17 PROCEDURE — G8484 FLU IMMUNIZE NO ADMIN: HCPCS | Performed by: INTERNAL MEDICINE

## 2022-02-17 PROCEDURE — G8427 DOCREV CUR MEDS BY ELIG CLIN: HCPCS | Performed by: INTERNAL MEDICINE

## 2022-02-17 PROCEDURE — 1090F PRES/ABSN URINE INCON ASSESS: CPT | Performed by: INTERNAL MEDICINE

## 2022-02-17 PROCEDURE — 93798 PHYS/QHP OP CAR RHAB W/ECG: CPT

## 2022-02-17 PROCEDURE — 1123F ACP DISCUSS/DSCN MKR DOCD: CPT | Performed by: INTERNAL MEDICINE

## 2022-02-17 PROCEDURE — 4040F PNEUMOC VAC/ADMIN/RCVD: CPT | Performed by: INTERNAL MEDICINE

## 2022-02-17 PROCEDURE — 99214 OFFICE O/P EST MOD 30 MIN: CPT | Performed by: INTERNAL MEDICINE

## 2022-02-17 PROCEDURE — 1036F TOBACCO NON-USER: CPT | Performed by: INTERNAL MEDICINE

## 2022-02-17 NOTE — LETTER
Lipitor 80 mg daily, Plavix 75 mg daily, HydroDIURIL 25 mg daily, Toprol-XL 50 mg b.i.d., vitamin D 1000 units daily, vitamin E 400 units daily, zinc 50 mg daily. PAST MEDICAL AND SURGICAL HISTORY:  1. Hyperlipidemia. 2.  Hypertension. 3.  Kidney stone. 4.  Tubal ligation. 5.  Appendectomy. 6.  Cholecystectomy in 2014.  7.  COVID in 06/2021 with 6 days of hospitalization. 8.  SVT as stated previously. 9.  MI secondary to SCAD. FAMILY HISTORY:  Father had heart disease. Hyperlipidemia in brother and sister. SOCIAL HISTORY:  She is 65years old. . Does not smoke. Does not drink alcohol. She stays active. She is in cardiac rehab. REVIEW OF SYSTEMS:  Cardiac as above. Other systems reviewed including constitutional, eyes, ears, nose and throat, cardiovascular, respiratory, GI, , musculoskeletal, integumentary, neurologic, endocrine, hematologic and allergic/immunologic are negative except for what is described above. No weight loss or weight gain. No change in bowel habits. No blood in stools. No fevers, sweats or chills. PHYSICAL EXAMINATION:  VITAL SIGNS:  Her blood pressure was 160/90, with a heart rate of 70 and regular. Respiratory rate 18. O2 saturation 95%. Weight 166 pounds. GENERAL:  She is a pleasant 65-year-old female. Denied pain. She was oriented to person, place and time. Answered questions appropriately. SKIN:  No unusual skin changes. HEENT:  The pupils are equally round and intact. Mucous membranes were dry. NECK:  No JVD. Good carotid pulses. No carotid bruits. No lymphadenopathy or thyromegaly. CARDIOVASCULAR EXAM:  S1 and S2 were normal.  No S3 or S4. Soft systolic blowing type murmur. No diastolic murmur. PMI was normal.  No lift, thrust, or pericardial friction rub. LUNGS:  Clear to auscultation and percussion. ABDOMEN:  Soft and nontender. Good bowel sounds. EXTREMITIES:  Good femoral pulses. Good pedal pulses. No pedal edema. Skin was warm and dry. No calf tenderness. Nail beds pink. Good cap refill. PULSES:  Bilateral symmetrical radial, brachial and carotid pulses. No carotid bruits. Good femoral and pedal pulses. NEUROLOGIC EXAM:  Within normal limits. PSYCHIATRIC EXAM:  Within normal limits. LABORATORY DATA:  Sodium 144, potassium 3.9, BUN 12, creatinine 0.62, GFR greater than 60, magnesium 2.2. Glucose was 106. Cholesterol was 96, HDL was 43, LDL 36, triglycerides 84. ALT was 38, AST was 37. TSH 1.92. Vitamin D 36.6. White count 5.2, hemoglobin 14.7, with platelet count 675,120. EKG showed sinus rhythm, normal EKG. IMPRESSION:  1. Status post myocardial infarction on 11/10/2021, secondary to SCAD (spontaneous coronary artery dissection), with spontaneous dissection of the distal portion of the first OM, with normal LAD and circumflex, and normal LV function, EF of 55% to 60%. 2.  Hypertension. 3.  Hyperlipidemia. 4.  History of SVT. 5.  COVID in 06/2021. PLAN:  1. No change in medications. 2.  We will see in November and stop Plavix at that time, however, leave on aspirin indefinitely. DISCUSSION:  Mrs. Dayna Doshi overall is doing well. She had spontaneous dissection of the distal portion of her OM but has done well. She had essentially normal coronary arteries. Her blood pressure is high here in our office but in cardiac rehab, it has been running good in the 130s. Thus, I will make no change in her medications. I will see her in November, and at that time, we will stop her Plavix. She has done well in cardiac rehab and I have encouraged her to continue to exercise after finishing. Thank you very much for allowing me the privilege of seeing Mrs. Alexandra. If you have any questions on my thoughts, please do not hesitate to contact me.     Sincerely,        De Breath    D: 02/21/2022 4:46:15     T: 02/21/2022 6:14:44     [email or web address]  Job#: [id number]   Doc#: [id number]

## 2022-02-17 NOTE — PROGRESS NOTES
Ov DR Bakari Stanley 3 mth follow up  No chest pain or sob  No hospitalizations or procedures  Since seen. Is doing cardiac rehab doing well. No ankle edema.   Will see in Nov  then stop plavix

## 2022-02-21 NOTE — PROGRESS NOTES
Lipitor 80 mg daily, Plavix 75 mg daily, HydroDIURIL 25 mg daily, Toprol-XL 50 mg b.i.d., vitamin D 1000 units daily, vitamin E 400 units daily, zinc 50 mg daily. PAST MEDICAL AND SURGICAL HISTORY:  1. Hyperlipidemia. 2.  Hypertension. 3.  Kidney stone. 4.  Tubal ligation. 5.  Appendectomy. 6.  Cholecystectomy in 2014.  7.  COVID in 06/2021 with 6 days of hospitalization. 8.  SVT as stated previously. 9.  MI secondary to SCAD. FAMILY HISTORY:  Father had heart disease. Hyperlipidemia in brother and sister. SOCIAL HISTORY:  She is 65years old. . Does not smoke. Does not drink alcohol. She stays active. She is in cardiac rehab. REVIEW OF SYSTEMS:  Cardiac as above. Other systems reviewed including constitutional, eyes, ears, nose and throat, cardiovascular, respiratory, GI, , musculoskeletal, integumentary, neurologic, endocrine, hematologic and allergic/immunologic are negative except for what is described above. No weight loss or weight gain. No change in bowel habits. No blood in stools. No fevers, sweats or chills. PHYSICAL EXAMINATION:  VITAL SIGNS:  Her blood pressure was 160/90, with a heart rate of 70 and regular. Respiratory rate 18. O2 saturation 95%. Weight 166 pounds. GENERAL:  She is a pleasant 78-year-old female. Denied pain. She was oriented to person, place and time. Answered questions appropriately. SKIN:  No unusual skin changes. HEENT:  The pupils are equally round and intact. Mucous membranes were dry. NECK:  No JVD. Good carotid pulses. No carotid bruits. No lymphadenopathy or thyromegaly. CARDIOVASCULAR EXAM:  S1 and S2 were normal.  No S3 or S4. Soft systolic blowing type murmur. No diastolic murmur. PMI was normal.  No lift, thrust, or pericardial friction rub. LUNGS:  Clear to auscultation and percussion. ABDOMEN:  Soft and nontender. Good bowel sounds. EXTREMITIES:  Good femoral pulses. Good pedal pulses. No pedal edema. Skin was warm and dry. No calf tenderness. Nail beds pink. Good cap refill. PULSES:  Bilateral symmetrical radial, brachial and carotid pulses. No carotid bruits. Good femoral and pedal pulses. NEUROLOGIC EXAM:  Within normal limits. PSYCHIATRIC EXAM:  Within normal limits. LABORATORY DATA:  Sodium 144, potassium 3.9, BUN 12, creatinine 0.62, GFR greater than 60, magnesium 2.2. Glucose was 106. Cholesterol was 96, HDL was 43, LDL 36, triglycerides 84. ALT was 38, AST was 37. TSH 1.92. Vitamin D 36.6. White count 5.2, hemoglobin 14.7, with platelet count 777,525. EKG showed sinus rhythm, normal EKG. IMPRESSION:  1. Status post myocardial infarction on 11/10/2021, secondary to SCAD (spontaneous coronary artery dissection), with spontaneous dissection of the distal portion of the first OM, with normal LAD and circumflex, and normal LV function, EF of 55% to 60%. 2.  Hypertension. 3.  Hyperlipidemia. 4.  History of SVT. 5.  COVID in 06/2021. PLAN:  1. No change in medications. 2.  We will see in November and stop Plavix at that time, however, leave on aspirin indefinitely. DISCUSSION:  Mrs. Maria Esther Frausto overall is doing well. She had spontaneous dissection of the distal portion of her OM but has done well. She had essentially normal coronary arteries. Her blood pressure is high here in our office but in cardiac rehab, it has been running good in the 130s. Thus, I will make no change in her medications. I will see her in November, and at that time, we will stop her Plavix. She has done well in cardiac rehab and I have encouraged her to continue to exercise after finishing. Thank you very much for allowing me the privilege of seeing Mrs. Alexandra. If you have any questions on my thoughts, please do not hesitate to contact me.     Sincerely,        Argentina Dumont    D: 02/21/2022 4:46:15     T: 02/21/2022 6:14:44     [email or web address]  Job#: [id number]   Doc#: [id number]

## 2022-02-22 ENCOUNTER — HOSPITAL ENCOUNTER (OUTPATIENT)
Dept: CARDIAC REHAB | Age: 77
Setting detail: THERAPIES SERIES
Discharge: HOME | End: 2022-02-22
Payer: MEDICARE

## 2022-02-22 PROCEDURE — 93798 PHYS/QHP OP CAR RHAB W/ECG: CPT

## 2022-02-24 ENCOUNTER — HOSPITAL ENCOUNTER (OUTPATIENT)
Dept: CARDIAC REHAB | Age: 77
Setting detail: THERAPIES SERIES
Discharge: HOME | End: 2022-02-24
Payer: MEDICARE

## 2022-02-24 PROCEDURE — 93798 PHYS/QHP OP CAR RHAB W/ECG: CPT

## 2022-03-01 ENCOUNTER — HOSPITAL ENCOUNTER (OUTPATIENT)
Dept: CARDIAC REHAB | Age: 77
Setting detail: THERAPIES SERIES
Discharge: HOME | End: 2022-03-01
Payer: MEDICARE

## 2022-03-01 PROCEDURE — 93798 PHYS/QHP OP CAR RHAB W/ECG: CPT

## 2022-03-03 ENCOUNTER — HOSPITAL ENCOUNTER (OUTPATIENT)
Dept: CARDIAC REHAB | Age: 77
Setting detail: THERAPIES SERIES
Discharge: HOME | End: 2022-03-03
Payer: MEDICARE

## 2022-03-03 ENCOUNTER — OFFICE VISIT (OUTPATIENT)
Dept: FAMILY MEDICINE CLINIC | Age: 77
End: 2022-03-03
Payer: MEDICARE

## 2022-03-03 VITALS
HEART RATE: 72 BPM | BODY MASS INDEX: 31.34 KG/M2 | WEIGHT: 166 LBS | SYSTOLIC BLOOD PRESSURE: 164 MMHG | HEIGHT: 61 IN | OXYGEN SATURATION: 98 % | DIASTOLIC BLOOD PRESSURE: 92 MMHG

## 2022-03-03 DIAGNOSIS — E78.00 PURE HYPERCHOLESTEROLEMIA: ICD-10-CM

## 2022-03-03 DIAGNOSIS — R00.0 TACHYCARDIA: ICD-10-CM

## 2022-03-03 DIAGNOSIS — I10 ESSENTIAL HYPERTENSION, BENIGN: ICD-10-CM

## 2022-03-03 DIAGNOSIS — Z00.00 MEDICARE ANNUAL WELLNESS VISIT, SUBSEQUENT: Primary | ICD-10-CM

## 2022-03-03 PROCEDURE — G0439 PPPS, SUBSEQ VISIT: HCPCS | Performed by: FAMILY MEDICINE

## 2022-03-03 PROCEDURE — 1036F TOBACCO NON-USER: CPT | Performed by: FAMILY MEDICINE

## 2022-03-03 PROCEDURE — 99213 OFFICE O/P EST LOW 20 MIN: CPT | Performed by: FAMILY MEDICINE

## 2022-03-03 PROCEDURE — 93798 PHYS/QHP OP CAR RHAB W/ECG: CPT

## 2022-03-03 PROCEDURE — 1123F ACP DISCUSS/DSCN MKR DOCD: CPT | Performed by: FAMILY MEDICINE

## 2022-03-03 PROCEDURE — G8427 DOCREV CUR MEDS BY ELIG CLIN: HCPCS | Performed by: FAMILY MEDICINE

## 2022-03-03 PROCEDURE — 1090F PRES/ABSN URINE INCON ASSESS: CPT | Performed by: FAMILY MEDICINE

## 2022-03-03 PROCEDURE — G8400 PT W/DXA NO RESULTS DOC: HCPCS | Performed by: FAMILY MEDICINE

## 2022-03-03 PROCEDURE — G8484 FLU IMMUNIZE NO ADMIN: HCPCS | Performed by: FAMILY MEDICINE

## 2022-03-03 PROCEDURE — 4040F PNEUMOC VAC/ADMIN/RCVD: CPT | Performed by: FAMILY MEDICINE

## 2022-03-03 PROCEDURE — G8417 CALC BMI ABV UP PARAM F/U: HCPCS | Performed by: FAMILY MEDICINE

## 2022-03-03 ASSESSMENT — PATIENT HEALTH QUESTIONNAIRE - PHQ9
SUM OF ALL RESPONSES TO PHQ QUESTIONS 1-9: 0
SUM OF ALL RESPONSES TO PHQ QUESTIONS 1-9: 0
SUM OF ALL RESPONSES TO PHQ9 QUESTIONS 1 & 2: 0
SUM OF ALL RESPONSES TO PHQ QUESTIONS 1-9: 0
2. FEELING DOWN, DEPRESSED OR HOPELESS: 0
SUM OF ALL RESPONSES TO PHQ QUESTIONS 1-9: 0
1. LITTLE INTEREST OR PLEASURE IN DOING THINGS: 0

## 2022-03-03 ASSESSMENT — ENCOUNTER SYMPTOMS
BLOOD IN STOOL: 0
DIARRHEA: 0
NAUSEA: 0
EYE DISCHARGE: 0
SHORTNESS OF BREATH: 0
VOMITING: 0
COUGH: 0
EYE REDNESS: 0
ABDOMINAL PAIN: 0
CONSTIPATION: 0

## 2022-03-03 ASSESSMENT — LIFESTYLE VARIABLES: HOW OFTEN DO YOU HAVE A DRINK CONTAINING ALCOHOL: NEVER

## 2022-03-03 NOTE — PATIENT INSTRUCTIONS
Personalized Preventive Plan for Rissa Alexandra - 3/3/2022  Medicare offers a range of preventive health benefits. Some of the tests and screenings are paid in full while other may be subject to a deductible, co-insurance, and/or copay. Some of these benefits include a comprehensive review of your medical history including lifestyle, illnesses that may run in your family, and various assessments and screenings as appropriate. After reviewing your medical record and screening and assessments performed today your provider may have ordered immunizations, labs, imaging, and/or referrals for you. A list of these orders (if applicable) as well as your Preventive Care list are included within your After Visit Summary for your review. Other Preventive Recommendations:    · A preventive eye exam performed by an eye specialist is recommended every 1-2 years to screen for glaucoma; cataracts, macular degeneration, and other eye disorders. · A preventive dental visit is recommended every 6 months. · Try to get at least 150 minutes of exercise per week or 10,000 steps per day on a pedometer . · Order or download the FREE \"Exercise & Physical Activity: Your Everyday Guide\" from [employer]. Call 6-154.228.6244 or search [employer] online. · You need 8506-5704 mg of calcium and 6893-5275 IU of vitamin D per day. It is possible to meet your calcium requirement with diet alone, but a vitamin D supplement is usually necessary to meet this goal.  · When exposed to the sun, use a sunscreen that protects against both UVA and UVB radiation with an SPF of 30 or greater. Reapply every 2 to 3 hours or after sweating, drying off with a towel, or swimming. · Always wear a seat belt when traveling in a car. Always wear a helmet when riding a bicycle or motorcycle.

## 2022-03-03 NOTE — PROGRESS NOTES
Yes    Health Habits/Nutrition Interventions:  · Nutritional issues:  good diet and exercise    Hearing/Vision:  Do you or your family notice any trouble with your hearing that hasn't been managed with hearing aids?: No  Do you have difficulty driving, watching TV, or doing any of your daily activities because of your eyesight?: No  Have you had an eye exam within the past year?: (!) No  No exam data present    Hearing/Vision Interventions:  · Vision concerns:  patient encouraged to make appointment with his/her eye specialist            Objective   Vitals:    03/03/22 1046 03/03/22 1058   BP: (!) 160/92 (!) 164/92   Site: Left Upper Arm Right Upper Arm   Cuff Size: Medium Adult Medium Adult   Pulse: 72    SpO2: 98%    Weight: 166 lb (75.3 kg)    Height: 5' 1.3\" (1.557 m)       Body mass index is 31.06 kg/m². No Known Allergies  Prior to Visit Medications    Medication Sig Taking? Authorizing Provider   hydroCHLOROthiazide (HYDRODIURIL) 25 MG tablet TAKE 1 TABLET BY MOUTH EVERY DAY Yes Axel Cano MD   metoprolol succinate (TOPROL XL) 50 MG extended release tablet Take 1 tablet by mouth 2 times daily Yes Axel Cano MD   zinc gluconate 50 MG tablet Take 50 mg by mouth daily Yes Historical Provider, MD   vitamin D (CHOLECALCIFEROL) 1000 UNIT TABS tablet Take 1,000 Units by mouth daily Yes Historical Provider, MD   Misc Natural Products (GLUCOSAMINE CHONDROITIN ADV) TABS Take 1 tablet by mouth 2 times daily. Glucosamine 1500mg / Chondroitin 1200mg Yes Historical Provider, MD   Calcium-Vitamin D (CALTRATE 600 PLUS-VIT D PO) Take 2 tablets by mouth daily. Yes Historical Provider, MD   ascorbic acid (VITAMIN C) 500 MG tablet Take 500 mg by mouth daily. Yes Historical Provider, MD   vitamin E 400 UNIT capsule Take 400 Units by mouth daily.  Yes Historical Provider, MD   clopidogrel (PLAVIX) 75 MG tablet Take 1 tablet by mouth daily  Axel Cano MD   atorvastatin (LIPITOR) 80 MG tablet Take 1 tablet by mouth daily  Maira Edmonds MD       [facility] (Including outside providers/suppliers regularly involved in providing care):   Patient Care Team:  Katlin Alcocer MD as PCP - Hernandez Hampton MD as PCP - [facility] Empaneled Provider    Reviewed and updated this visit:  Tobacco  Allergies  Meds  Problems  Med Hx  Surg Hx  Soc Hx  Fam Hx

## 2022-03-03 NOTE — PROGRESS NOTES
HPI Notes    Name: Emerita Holland  : 1945        Chief Complaint:     Chief Complaint   Patient presents with    Medicare AWV    Hyperlipidemia    Hypertension    Tachycardia       History of Present Illness:     Emerita Holland is a 65 y.o.  female who presents with Medicare AWV, Hyperlipidemia, Hypertension, and Tachycardia      Hyperlipidemia  This is a chronic problem. The current episode started more than 1 year ago. The problem is controlled. Recent lipid tests were reviewed and are normal. She has no history of diabetes or hypothyroidism. There are no known factors aggravating her hyperlipidemia. Pertinent negatives include no chest pain or shortness of breath. Current antihyperlipidemic treatment includes statins. The current treatment provides significant improvement of lipids. There are no compliance problems. Risk factors for coronary artery disease include dyslipidemia and hypertension. Hypertension  This is a chronic problem. The current episode started more than 1 year ago. The problem is unchanged. The problem is controlled (home BP -- pt brought in the readings and all good 105-128/67-83. Pt states she also had her BP monitor checked with Dr Farmer [facility] office. ). Pertinent negatives include no chest pain, palpitations, peripheral edema or shortness of breath. There are no associated agents to hypertension. Risk factors for coronary artery disease include dyslipidemia and post-menopausal state. The current treatment provides significant improvement. Hypertensive end-organ damage includes CAD/MI. Tachycardia - chronic but stable. No palpitations. No SOB or chest pain.     Past Medical History:     Past Medical History:   Diagnosis Date    Hyperlipidemia     Hypertension     Kidney stone       Reviewed all health maintenance requirements and ordered appropriate tests  Health Maintenance Due   Topic Date Due    Hepatitis C screen  Never done    COVID-19 Vaccine (1) Never done  Depression Screen  Never done    DTaP/Tdap/Td vaccine (1 - Tdap) Never done    Shingles Vaccine (1 of 2) Never done    Flu vaccine (1) 09/01/2021    Annual Wellness Visit (AWV)  03/03/2022       Past Surgical History:     Past Surgical History:   Procedure Laterality Date    APPENDECTOMY      CARDIAC SURGERY      CHOLECYSTECTOMY  10/09/14    laparoscopy    COLONOSCOPY  2009    wnl    TUBAL LIGATION          Medications:       Prior to Admission medications    Medication Sig Start Date End Date Taking? Authorizing Provider   hydroCHLOROthiazide (HYDRODIURIL) 25 MG tablet TAKE 1 TABLET BY MOUTH EVERY DAY 2/11/22  Yes Ash Lara MD   metoprolol succinate (TOPROL XL) 50 MG extended release tablet Take 1 tablet by mouth 2 times daily 11/30/21  Yes Ash Lara MD   zinc gluconate 50 MG tablet Take 50 mg by mouth daily   Yes Historical Provider, MD   vitamin D (CHOLECALCIFEROL) 1000 UNIT TABS tablet Take 1,000 Units by mouth daily   Yes Historical Provider, MD   Misc Natural Products (GLUCOSAMINE CHONDROITIN ADV) TABS Take 1 tablet by mouth 2 times daily. Glucosamine 1500mg / Chondroitin 1200mg   Yes Historical Provider, MD   Calcium-Vitamin D (CALTRATE 600 PLUS-VIT D PO) Take 2 tablets by mouth daily. Yes Historical Provider, MD   ascorbic acid (VITAMIN C) 500 MG tablet Take 500 mg by mouth daily. Yes Historical Provider, MD   vitamin E 400 UNIT capsule Take 400 Units by mouth daily. Yes Historical Provider, MD   clopidogrel (PLAVIX) 75 MG tablet Take 1 tablet by mouth daily 11/30/21 2/17/22  Ash Lara MD   atorvastatin (LIPITOR) 80 MG tablet Take 1 tablet by mouth daily 11/17/21 2/17/22  Heron Barba MD        Allergies:       Patient has no known allergies. Social History:     Tobacco:    reports that she has never smoked. She has never used smokeless tobacco.  Alcohol:      reports no history of alcohol use. Drug Use:  reports no history of drug use.     Family History: Family History   Problem Relation Age of Onset    High Blood Pressure Mother     Heart Disease Father 61    Heart Attack Father     High Blood Pressure Sister     High Cholesterol Sister     Cancer Brother         colon    High Blood Pressure Brother     High Cholesterol Brother        Review of Systems:       Review of Systems   Constitutional: Negative for chills, fatigue, fever and unexpected weight change. Eyes: Negative for discharge and redness. Respiratory: Negative for cough and shortness of breath. Cardiovascular: Negative for chest pain, palpitations and leg swelling. Gastrointestinal: Negative for abdominal pain, blood in stool, constipation, diarrhea, nausea and vomiting. Genitourinary: Negative for dysuria and hematuria. Musculoskeletal: Negative for joint swelling and neck stiffness. Skin: Negative for rash. Neurological: Negative for dizziness and facial asymmetry. Psychiatric/Behavioral: Negative for sleep disturbance. Physical Exam:     Physical Exam  Vitals reviewed. Constitutional:       General: She is not in acute distress. Appearance: Normal appearance. She is well-developed. She is not ill-appearing. HENT:      Head: Normocephalic and atraumatic. Eyes:      General:         Right eye: No discharge. Left eye: No discharge. Conjunctiva/sclera: Conjunctivae normal.   Neck:      Thyroid: No thyromegaly. Vascular: No carotid bruit. Cardiovascular:      Rate and Rhythm: Normal rate and regular rhythm. Heart sounds: Normal heart sounds. No murmur heard. Pulmonary:      Effort: Pulmonary effort is normal. No respiratory distress. Breath sounds: Normal breath sounds. Abdominal:      General: There is no distension. Palpations: Abdomen is soft. Tenderness: There is no abdominal tenderness. Musculoskeletal:      Cervical back: Neck supple. Right lower leg: No edema. Left lower leg: No edema. Lymphadenopathy:      Cervical: No cervical adenopathy. Skin:     Findings: No erythema or rash. Neurological:      General: No focal deficit present. Mental Status: She is alert and oriented to person, place, and time. Psychiatric:         Mood and Affect: Mood normal.         Behavior: Behavior normal.         Vitals:  BP (!) 164/92 (Site: Right Upper Arm, Cuff Size: Medium Adult)   Pulse 72   Ht 5' 1.3\" (1.557 m)   Wt 166 lb (75.3 kg)   SpO2 98%   BMI 31.06 kg/m²       Data:     Lab Results   Component Value Date     02/10/2022    K 3.9 02/10/2022     02/10/2022    CO2 31 02/10/2022    BUN 12 02/10/2022    CREATININE 0.62 02/10/2022    GLUCOSE 106 02/10/2022    GLUCOSE 102 03/02/2012    PROT 7.1 02/10/2022    LABALBU 4.1 02/10/2022    LABALBU 4.6 03/02/2012    BILITOT 1.97 02/10/2022    ALKPHOS 61 02/10/2022    AST 37 02/10/2022    ALT 38 02/10/2022     Lab Results   Component Value Date    WBC 5.2 02/10/2022    RBC 4.92 02/10/2022    HGB 14.7 02/10/2022    HCT 43.5 02/10/2022    MCV 88.4 02/10/2022    MCH 29.9 02/10/2022    MCHC 33.8 02/10/2022    RDW 13.4 02/10/2022     02/10/2022    MPV NOT REPORTED 02/10/2022     Lab Results   Component Value Date    TSH 1.92 02/10/2022     Lab Results   Component Value Date    CHOL 96 02/10/2022    HDL 43 02/10/2022    LABA1C 4.8 08/25/2021          Assessment/Plan:        1. Essential hypertension, benign  Stable on HCTZ and toprol     2. Pure hypercholesterolemia  Stable on lipitor     3. Tachycardia  Stable on toprol    4. Medicare annual wellness visit, subsequent  completed        Eva received counseling on the following healthy behaviors: nutrition and exercise  Reviewed prior labs and health maintenance  Continue current medications, diet and exercise. Discussed use, benefit, and side effects of prescribed medications. Barriers to medication compliance addressed.   Patient given educational materials - see patient instructions  Was a self-tracking handout given in paper form or via [employer]? Yes    Requested Prescriptions      No prescriptions requested or ordered in this encounter       All patient questions answered. Patient voiced understanding. Quality Measures    Body mass index is 31.06 kg/m². No Treatment plan indicated. Weight control planned discussed Healthy diet and regular exercise. BP: (!) 164/92. Blood pressure is high. Treatment plan consists of home BPs are good and No treatment change needed. Fall Risk 3/3/2022 3/2/2021 9/3/2019 3/6/2018 9/6/2016 9/1/2015 9/3/2014   2 or more falls in past year? no no yes no no no no   Fall with injury in past year? no no no no no no no     The patient does not have a history of falls. I did not - not indicated , complete a risk assessment for falls. A plan of care for falls No Treatment plan indicated    Lab Results   Component Value Date    LDLCHOLESTEROL 36 02/10/2022    (goal LDL reduction with dx if diabetes is 50% LDL reduction)    PHQ Scores 3/3/2022 3/2/2021 3/3/2020 9/3/2019 3/5/2019 3/6/2018 9/6/2016   PHQ2 Score 0 0 0 0 0 0 0   PHQ9 Score 0 0 0 0 0 0 0     Interpretation of Total Score Depression Severity: 1-4 = Minimal depression, 5-9 = Mild depression, 10-14 = Moderate depression, 15-19 = Moderately severe depression, 20-27 = Severe depression        Return in 6 months (on 9/3/2022) for Medicare Annual Wellness Visit in 1 year and 6mos , HTN, Hyperlipidemia.       Electronically signed by Anthony Daniel MD on 3/3/2022 at 11:20 AM

## 2022-03-08 ENCOUNTER — HOSPITAL ENCOUNTER (OUTPATIENT)
Dept: CARDIAC REHAB | Age: 77
Setting detail: THERAPIES SERIES
Discharge: HOME | End: 2022-03-08
Payer: MEDICARE

## 2022-03-08 PROCEDURE — 93798 PHYS/QHP OP CAR RHAB W/ECG: CPT

## 2022-03-10 ENCOUNTER — HOSPITAL ENCOUNTER (OUTPATIENT)
Dept: CARDIAC REHAB | Age: 77
Setting detail: THERAPIES SERIES
Discharge: HOME | End: 2022-03-10
Payer: MEDICARE

## 2022-03-10 PROCEDURE — 93798 PHYS/QHP OP CAR RHAB W/ECG: CPT

## 2022-03-10 NOTE — PROGRESS NOTES
Phase II Cardiac Rehab Individualized Treatment Plan-120 Day      Patient Name: Sweta Stiles #: [de-identified]   Date of Initial Assessment: 2/10/2022  Diagnosis: myocardial infarction  Onset Date: 11/9/21  Referring Physician: DR. Moody Party  [id number]  Session Number: 31  EXERCISE    Stages of Change:   [] pre-contemplation  [x] Action   [] Contemplate   [] Maintainence   [] Prep   [] Relapse          Exercise Prescription:  Mode: [x]? TM [x]? UBE [x]? STP []? EL [x]? R  Frequency: 3 DAYS PER WEEK  Duration: 31-60 MINUTES  Intensity: 4.5-5.5 METS  Plan/Goal: Increase 1-2 levels/week or 1-2 min/week to achieve target HR and RPE   12-16. Progression: INCREASE DURATION PER ABOVE F.I.T.T. Rx  PARAMETERS ON AVG OF 5-10 MIN / 1-2 WEEKS FOR THE FIRST 4-6 WEEKS. AFTER 3-4 WEEKS ARE COMPLETED, CONTINUE TO GRADUALLY INCREASE F. I.T. PARAMETERS GRADUALLY UPWARD AT THE ESTABLISHED DURATION ON AVERAGE 0.5-1.0 METS PER 30 DAYS OVER THE COARSE OF REMAINING PROGRAM AS ESTABLISHED BY PT-CENTERED GOALS AND GUIDELINES. Target  - 114      []? Angina with Exertion            [x]? Resistance Training  Progress 8-12 bilateral UE and LE progressive resistance exercises at 1-3 sets per lift, on 2-3 non-consecutive days using weights/ GREEN  therabands AND WTS TO 8-24 # for 8-15 reps to progressive overload by increasing resistance once reps progressed to at least 15 reps on at least 2 occasions      Hypertension:  [x] Yes  [] No  Resting BP: 130/68  Peak Exercise BP: 166/62  [] Med change?  NO    Intervention:  Home Exercise:  Type: WALKING  Duration: 30-60 MINUTES  Frequency: 2-3 DAYS PER WEEK   [x] Resistance Training  PROGRESS 8-12 bilateral UE and LE progressive resistance exercises at 1-3 sets per lift, on 2-3 non-consecutive days using weights/ GREEN  therabands AND WTS TO 8-24 # for 8-15 reps to progressive overload by increasing resistance once reps progressed to at least 15 reps on at least 2 occasions     Education:   [x] Equipment Dumont  [x] Self pulse   [x] Proper use weights/therabands   [x] S/S to report  [x] Low Na Diet    [x] Warm up/ Cool down  [x] BP Medication    [x] RPE Scale   [x] Understand BP   [x] Ex Safety   [x] Exercise specialist class-Home Exercise       Target Goal:   -Individual Exercise Plan  -Bp<130/80  -Aerobic active 30 + minutes 5-7 days per week    Nutrition    Stages of Change:   [] pre-contemplation  [x] Action   [] Contemplate   [] Maintainence   [] Prep   [] Relapse    Lipids:   Component Ref Range & Units 2/10/22 0838 8/25/21 0854 2/25/21 0925 8/25/20 0926 2/25/20 0910 8/27/19 0849 2/26/19 0839   Cholesterol <200 mg/dL 96  137 CM  129 CM  142 CM  144 CM  146 CM  137 CM    Comment:     Cholesterol Guidelines:       <200  Desirable    200-240  Borderline       >240  Undesirable        HDL >40 mg/dL 43  42 CM  49 CM  45 CM  56 CM  53 CM  53 CM    Comment:     HDL Guidelines:     <40     Undesirable    40-59    Borderline     >59     Desirable        LDL Cholesterol 0 - 130 mg/dL 36  75 CM  65 CM  73 CM  66 CM  71 CM  64 CM    Comment:     LDL Guidelines:      <100    Desirable    100-129   Near to/above Desirable    130-159   Borderline       >159   Undesirable       Direct (measured) LDL and calculated LDL are not interchangeable tests. Chol/HDL Ratio <5 2.2  3.3 CM  2.6 CM  3.2 CM  2.6 CM  2.8 CM  2.6 CM    Comment:        Triglycerides <150 mg/dL 87  101 CM  75 CM  119 CM  110 CM  111 CM  102 CM    Comment:     Triglyceride Guidelines:      <150   Desirable    150-199  Borderline    200-499  High      >499   Very high    Based on AHA Guidelines for fasting triglyceride, October 2012. [] Med Change? NO    Diabetes / Prediabetes:  [] Yes  [x] No    Weight Management:  Current Wt 164.4  Wt Goal: 1-2 lbs/wk [x] Loss  [] Gain  [] Maintain Current Wt.      Intervention:   [] Dietitian Consult       [x] Nurse/Patient Discussion     [x] Diet Class           [] Referred to Diabetes Education     Education:  [] S&S hypo/hyperglycemia  [x] Low fat/low cholesterol diet  [x] Weight loss methods      [] Relate Diabetes/CAD     [x] Eating heart healthy handout    Target Goal:  -LDL-C<100 if triglycerides are > 200  -LDL-C < 70 for high risk patients  -HbA1c < 7%  -BMI < 25   Education    Stages of Change:    [] pre-contemplation  [x] Action   [] Contemplate   [] Maintainence   [] Prep   [] Relapse    Family support: [x] Yes  [] No    Tobacco use: [] Yes  [x] No    Intervention:  [] Referred to smoking cessation counselor     [] Individual education and counseling  [] Tobacco adjunct  [] Informed of education class schedule     Education:   [x] Risk Factors/Modifications  [x] Psychological aspects  [x] Angina         [x] Medications  [] CHF                                    [] Diabetes     [x] Cardiac A&P                                 [] Smoking Cessation  [x] Stress Reduction and Relaxation                     [] Weight Management    Target Goal:  -Complete cessation of tobacco use (if applicable)  -Continued risk factor modifications  -Recognizing signs/symptoms to report  -Proper use of meds    Psychosocial  Stages of Change:    [] pre-contemplation  [x] Action   [] Contemplate   [] Maintainence   [] Prep   [] Relapse      Intervention:   [] Psych Consult/  [x] Uses stress management skills    [] Physician Referral    [x] Stress management class   [] Med Change? Education:    [x] Coping Techniques   [x] Relaxation techniques   [x] S/S of Depression    Target Goal:  -Assess presence or absence of depression using a valid screening tool. -Maximize coping skills.  -Positive support system.     Preventative Medication:   [x] Aspirin       [] Beta Blockade      [x] Statin or other lipid lowering agent     [x] Clopidogrel   [x] ACE Inhibitor   [] ACE Receptor Blocker   [] Antianginal    [] Calcium Channel Blocker   [] Other anticoagulation medications     Fall Risk assess: [x] Yes  [] No / REMAINS LOW RISK  Assistive Device:   [] Cane  [] Stalin Hagen [] Wheel Chair  [] Gait belt    Patient/Program goal:   To increase stamina, strength, and flexibility by exercising 31-50 total minutes by engaging in aerobic, resistance, and flexibility workout modalities with the goal of progressively achieving at least 0.5 to 1.0 metabolic equivalant improvement in the next 30 days as evidenced by daily session reports. / MEETING GOAL / AVG MET LEVEL INCREASED TO 4.5 METS / CONTINUE TO PROGRESS GOAL AS WRITTEN OVER THE NEXT 30 DAYS     To achieve and progress prescribed exercise frequency, intensity, time, and type in the next 30 days based upon initial evaluation and submaximal graded exercise results as evidenced by the attaining and maintaining the prescribed target heart rate range, a Reza rating of perceived exertion between 11 and 16, duration of >30 - 50 minutes using multiple exercise modes for at least 3 - 5 days per week to accumulate a minimum total of 2.5 hours per week of moderate aerobic intensity exercise, as tolerated, evidenced by daily session reports and home workout log. / [address] [place] [address]. / CONTINUE TO PROGRESS GOAL AS WRITTEN OVER THE NEXT 30 DAYS      To gradually and progressively lose 2 - 4 lb of body weight in the next 30 days through moderating nutrional intake and performing regular aerobic and strength training exercises as prescribed with improvement evidenced by daily session report comparison. / NOT MEETING GOAL / WEIGHT INCREASES FROM 155.6 LB .4 / CONTINUE TO PROGRESS GOAL AS WRITTEN OVER THE NEXT 30 DAYS     To decrease waist circumference by 5% by program completion if waist measurement is > or = 40 inches (male) / > or = 35 inches (female) as evidence by the Accomplishment Worksheet. / NO NEW VALUES     To introduce and progress 8-10 different bilateral UE and LE progressive resistance exercises focused on major muscle groups using 1-3 sets each per lift, on 2-3 non-consecutive days implementing free and machine weights and ORANGE therabands, as appropriate, with a resistance of 40-60% 1-repetition maximum or 10 -15 repetitions to progressive overload and increasing resistance once repetitions have progressed to 15 reps and feel fairly light on 2 prior occasions in the next 30 days. / PROGRESSING TOWARD GOAL / CONTINUE PROGRESSING GOAL AS WRITTEN THE NEXT 30 DAYS     To achieve and maintain an optimal average resting blood pressure of <130 / 80 mmHg, or as indicated by this patient's referring provider, in the next 30 days. / MEETING GOAL LAST 30 DAYS--CONTINUE PROGRESSING GOAL AS WRITTEN NEXT 30 DAYS     To strive for blood lipid optimization with an LDL-C of <100 mg/dL or  LDL 70 mg/dL, an HDL-C of > or = 40 mg/dL for men and > or = 50 mg/dL for women, and a triglyceride level of <150 mg/dL via lifestyle education, behavioral modification, and medication compliance. / BLOOD LIPIDS ON 2/10/22 SHOWED IMPROVEMENT SINCE PRIOR / PROGRESSING TOWARD GOAL     -To develop regular home aerobic exercise program for 20 - 60 minutes at least 2 non-rehab days per week, excluding  5 - 10 minutes warm-up and cool-down periods, within the next 30 days, being tracked on home workout log./ MEETING GOAL PER PT / CONTINUE TO PROGRESS GOAL AS WRITTEN OVER THE NEXT 30 DAYS     To reduce self-reported psycho-social feelings of stress in the next 30 days as evidenced by pre- and post- surveys and by routine rounding with patient to ascertain subjective improvements. / NO NEW SURVEYS     In the next 30-days, to eat on average at least 2 servings of fruit per day and 4-5 servings of vegetables per day as evidenced by pre- and post-program nutriton survey and routine rounding with patient to ascertain progression toward goal per patient's self report, food diary, and Rate-your-Plate screening survey. NO NEW SURVEYS     To strive to eat < or = 30% of daily caloric intake of total fat and <10% of daily caloric saturated fat tracked by patient's self-report, food diary, and Rate-your-Plate screening survey. / [address] / CONTINUE TO PROGRESS GOAL AS WRITTEN OVER THE NEXT 30 DAYS     To have patient demonstrate knowledge about risk factor reduction, lifestyle modification, and heart health strategies with > / = 80% accuracy via pre- and post-program Test your Lizette 17 tool. / NO NEW SURVEYS    Physician Changes/Comments:    Electronically signed by Annelise Pro RN on 3/10/22 at 11:53 AM EST  Cardiac Rehab Staff

## 2022-03-15 ENCOUNTER — HOSPITAL ENCOUNTER (OUTPATIENT)
Dept: CARDIAC REHAB | Age: 77
Setting detail: THERAPIES SERIES
Discharge: HOME | End: 2022-03-15
Payer: MEDICARE

## 2022-03-15 PROCEDURE — 93798 PHYS/QHP OP CAR RHAB W/ECG: CPT

## 2022-03-17 ENCOUNTER — HOSPITAL ENCOUNTER (OUTPATIENT)
Dept: CARDIAC REHAB | Age: 77
Setting detail: THERAPIES SERIES
Discharge: HOME | End: 2022-03-17
Payer: MEDICARE

## 2022-03-17 PROCEDURE — 93798 PHYS/QHP OP CAR RHAB W/ECG: CPT

## 2022-03-22 ENCOUNTER — HOSPITAL ENCOUNTER (OUTPATIENT)
Dept: CARDIAC REHAB | Age: 77
Setting detail: THERAPIES SERIES
Discharge: HOME | End: 2022-03-22
Payer: MEDICARE

## 2022-03-22 PROCEDURE — 93798 PHYS/QHP OP CAR RHAB W/ECG: CPT

## 2022-03-24 ENCOUNTER — HOSPITAL ENCOUNTER (OUTPATIENT)
Dept: CARDIAC REHAB | Age: 77
Setting detail: THERAPIES SERIES
Discharge: HOME | End: 2022-03-24
Payer: MEDICARE

## 2022-03-24 PROCEDURE — 93798 PHYS/QHP OP CAR RHAB W/ECG: CPT

## 2022-03-29 ENCOUNTER — HOSPITAL ENCOUNTER (OUTPATIENT)
Dept: CARDIAC REHAB | Age: 77
Setting detail: THERAPIES SERIES
Discharge: HOME | End: 2022-03-29
Payer: MEDICARE

## 2022-03-29 PROCEDURE — 93798 PHYS/QHP OP CAR RHAB W/ECG: CPT

## 2022-03-29 NOTE — PROGRESS NOTES
Phase II Cardiac Rehab Individualized Treatment Plan-Discharge    Patient Name: yKra Bullock  Date of Initial Assessment: 3/29/2022  ACCOUNT #: [de-identified]  Date of Initial Assessment: 2/10/2022  Diagnosis: myocardial infarction  Onset Date: 11/9/21  Referring Physician: DR. Thiago Sue [id number]  Session Number: 36  EXERCISE    Stages of Change:   [] pre-contemplation  [x] Action   [] Contemplate   [] Maintainence   [] Prep   [] Relapse          Exercise Prescription:  Mode: [x]? ? TM [x]? ? UBE [x]? ? STP []?? EL [x]? ? R  Frequency: 3 DAYS PER WEEK  Duration: 31-60 MINUTES  Intensity: 4.5-5.5 METS  Plan/Goal: Increase 1-2 levels/week or 1-2 min/week to achieve target HR and RPE   12-16. Progression: INCREASE DURATION PER ABOVE F.I.T.T. Rx  PARAMETERS ON AVG OF 5-10 MIN / 1-2 WEEKS FOR THE FIRST 4-6 WEEKS.  AFTER 3-4 WEEKS ARE COMPLETED, CONTINUE TO GRADUALLY INCREASE F. I.T. PARAMETERS GRADUALLY UPWARD AT THE ESTABLISHED DURATION ON AVERAGE 0.5-1.0 METS PER 30 DAYS OVER THE COARSE OF REMAINING PROGRAM AS ESTABLISHED BY PT-CENTERED GOALS AND GUIDELINES.               Target HR 102 - 114      []? ? Angina with Exertion            [x]? ? Resistance Training  Progress 8-12 bilateral UE and LE progressive resistance exercises at 1-3 sets per lift, on 2-3 non-consecutive days using weights/ GREEN  therabands AND WTS TO 8-24 # for 8-15 reps to progressive overload by increasing resistance once reps progressed to at least 15 reps on at least 2 occasions    Hypertension:  [x] Yes  [] No  Resting BP: 148/90  Peak Exercise BP: 154/72  [] Med Change? no    Intervention:  Home Exercise:  Type: WALKING  Duration: 30-60 MINUTES  Frequency: 2-3 DAYS PER WEEK   [x]?  Resistance Training  PROGRESS 8-12 bilateral UE and LE progressive resistance exercises at 1-3 sets per lift, on 2-3 non-consecutive days using weights/ GREEN  therabands AND WTS TO 8-24 # for 8-15 reps to progressive overload by increasing resistance once reps progressed to at least 15 reps on at least 2 occasions     Depression Screening:  PHQ-9 SCORE: 0  Interpretation of Total Score Depression Severity: 1-4 = Minimal depression, 5-9 = Mild depression, 10-14 = Moderate depression, 15-19 = Moderately severe depression, 20-27 = Severe depression    Education:   [x] Education Goals met        Target Goal:   -Individual Exercise Plan  -Bp< 130/80  -Aerobic active 30 + minutes 5-7 days per week    Nutrition    Stages of Change:   [] pre-contemplation  [x] Action   [] Contemplate   [] Maintainenc   [] Prep   [] Relapse    Lipids:   NO NEW VALUES  [] Med Change? NO    Diabetes / Prediabetes:  [] Yes  [x] No    Weight Management:  Weight: 164.4 LB  Height:61 IN (2.40 M2)   BMI: 31.1  Wt Goal: 1-2 lbs/wk  [x] Loss  [] Gain  [] Maintain Current Wt.   Special Diet: Special Diet:  1,600 Kcal / day, 2 GM Na+, 30% total fat, <10% saturated fat, 25-35 GM fiber, cholesterol reduced, balanced nutrition plan to be promoted and taught in rehab    Rate My Plate: 60 -- ([id number])  Intervention:   [] Dietitian Consult       [x] Nurse/Patient Discussion     [x] Diet Class           [] Referred to Diabetes Education     Education:  [x] Education Goals met    Target Goal:  -LDL-C<100 if triglycerides are > 200  -LDL-C < 70 for high risk patients  -HbA1c < 7%  -BMI < 25   Education    Stages of Change:    [] pre-contemplation  [x] Action   [] Contemplate   [] Maintainence   [] Prep   [] Relapse    Knowledge test score: 100%      Family support: [x] Yes  [] No    Tobacco use: [] Yes  [x] No    Intervention:  [] Referred to smoking cessation counselor     [] Individual education and counseling  [] Tobacco adjunct  [] Informed of education class schedule     Education:   [x] Education goals met    Target Goal:  -Complete cessation of tobacco use (if applicable)  -Continued risk factor modifications  -Recognizing signs/symptoms to report  -Proper use of meds    Psychosocial  Stages of Change:    [] pre-contemplation  [x] Action   [] Contemplate   [] Maintainence   [] Prep   [] Relapse    Psychosocial Test:  Tool Used: Priyank Villatoro Quality of Life  Score: 28.5  Depression screening score PHQ-9: 0  Interpretation of Total Score Depression Severity: 1-4 = Minimal depression, 5-9 = Mild depression, 10-14 = Moderate depression, 15-19 = Moderately severe depression, 20-27 = Severe depression  []Medication change? NONE    Education:    [x] Education Goals met    Target Goal:  -Assess presence or absence of depression using a valid screening tool. -Maximize coping skills.  -Positive support system. Preventative Medication:   [x] Aspirin       [] Beta Blockade      [x] Statin or other lipid lowering agent     [x] Clopidogrel   [] ACE Inhibitor   [] ACE Receptor Blocker   [] Antianginal    [] Calcium Channel Blocker   [] Other anticoagulation medications     Fall Risk assess: [x] Yes  [] No / LOW FALL RISK  Assistive Device:   [] Cane  [] Dorothe Oldham [] Wheel Chair  [] Gait belt    Discharge Patient/Program Goal Accomplishments:    To increase stamina, strength, and flexibility by exercising 31-50 total minutes by engaging in aerobic, resistance, and flexibility workout modalities with the goal of progressively achieving at least 0.5 to 1.0 metabolic equivalant improvement in the next 30 days as evidenced by daily session reports. / GOAL MET  Date: 11/18/21 3/29/22     PRE-PROGRAM POST-PROGRAM % Improvement   AVERAGE ENDURANCE LEVEL (mets): 3.3 5.4 64%   MAXIMUM ENDURANCE LEVEL (mets): 6.4 7.1 11%        To achieve and progress prescribed exercise frequency, intensity, time, and type in the next 30 days based upon initial evaluation and submaximal graded exercise results as evidenced by the attaining and maintaining the prescribed target heart rate range, a Reza rating of perceived exertion between 11 and 16, duration of >30 - 50 minutes using multiple exercise modes for home workout log./ GOAL MET    To reduce self-reported psycho-social feelings of stress in the next 30 days as evidenced by pre- and post- surveys and by routine rounding with patient to ascertain subjective improvements.  GOAL MET / PHQ-9 SCORE = 0 / Interpretation of Total Score Depression Severity: 1-4 = Minimal depression, 5-9 = Mild depression, 10-14 = Moderate depression, 15-19 = Moderately severe depression, 20-27 = Severe depression    In the next 30-days, to eat on average at least 2 servings of fruit per day and 4-5 servings of vegetables per day as evidenced by pre- and post-program nutriton survey and routine rounding with patient to ascertain progression toward goal per patient's self report, food diary, and Rate-your-Plate screening survey. / GOAL MET     To strive to eat < or = 30% of daily caloric intake of total fat and <10% of daily caloric saturated fat tracked by patient's self-report, food diary, and Rate-your-Plate screening survey. / GOAL MET     To have patient demonstrate knowledge about risk factor reduction, lifestyle modification, and heart health strategies with > / = 80% accuracy via pre- and post-program Test your Lizette 17 tool. / NO KNOWLEDGE TEST TAKEN PRE- OR POST-PROGRAM    Physician Changes/Comments:    Electronically signed by Farnaz Morales RN on 3/29/22 at 2:36 PM EDT  Cardiac Rehab Staff

## 2022-05-09 RX ORDER — METOPROLOL SUCCINATE 50 MG/1
TABLET, EXTENDED RELEASE ORAL
Qty: 60 TABLET | Refills: 5 | Status: SHIPPED | OUTPATIENT
Start: 2022-05-09

## 2022-05-09 RX ORDER — CLOPIDOGREL BISULFATE 75 MG/1
75 TABLET ORAL DAILY
Qty: 30 TABLET | Refills: 5 | Status: SHIPPED | OUTPATIENT
Start: 2022-05-09 | End: 2022-09-08

## 2022-05-09 NOTE — TELEPHONE ENCOUNTER
Last OV: 3/3/2022 AWV  Last RX:    Next scheduled apt: 9/8/2022 6 month chronic        surescript requesting a refill

## 2022-08-02 DIAGNOSIS — I10 ESSENTIAL HYPERTENSION, BENIGN: ICD-10-CM

## 2022-08-03 RX ORDER — HYDROCHLOROTHIAZIDE 25 MG/1
TABLET ORAL
Qty: 30 TABLET | Refills: 5 | Status: SHIPPED | OUTPATIENT
Start: 2022-08-03

## 2022-09-07 ENCOUNTER — HOSPITAL ENCOUNTER (EMERGENCY)
Age: 77
Discharge: HOME | End: 2022-09-07
Attending: FAMILY MEDICINE
Payer: MEDICARE

## 2022-09-07 VITALS
BODY MASS INDEX: 33.51 KG/M2 | WEIGHT: 177.5 LBS | SYSTOLIC BLOOD PRESSURE: 151 MMHG | HEIGHT: 61 IN | RESPIRATION RATE: 18 BRPM | TEMPERATURE: 98.3 F | HEART RATE: 82 BPM | DIASTOLIC BLOOD PRESSURE: 97 MMHG | OXYGEN SATURATION: 93 %

## 2022-09-07 DIAGNOSIS — Z51.89 VISIT FOR WOUND CHECK: Primary | ICD-10-CM

## 2022-09-07 PROCEDURE — 99282 EMERGENCY DEPT VISIT SF MDM: CPT

## 2022-09-07 RX ORDER — ATORVASTATIN CALCIUM 80 MG/1
80 TABLET, FILM COATED ORAL DAILY
COMMUNITY
End: 2022-09-08

## 2022-09-07 RX ORDER — CLOPIDOGREL BISULFATE 75 MG/1
75 TABLET ORAL DAILY
COMMUNITY

## 2022-09-07 RX ORDER — ASPIRIN 81 MG/1
81 TABLET ORAL DAILY
COMMUNITY

## 2022-09-07 ASSESSMENT — PAIN SCALES - GENERAL: PAINLEVEL_OUTOF10: 0

## 2022-09-07 ASSESSMENT — PAIN - FUNCTIONAL ASSESSMENT: PAIN_FUNCTIONAL_ASSESSMENT: NONE - DENIES PAIN

## 2022-09-07 NOTE — ED NOTES
Pt had a mole removed from the back of her right leg yesterday and when she woke up this morning the area is bleeding.       Janette Espinosa RN  09/07/22 [id number]

## 2022-09-08 ENCOUNTER — OFFICE VISIT (OUTPATIENT)
Dept: FAMILY MEDICINE CLINIC | Age: 77
End: 2022-09-08
Payer: MEDICARE

## 2022-09-08 VITALS
WEIGHT: 176 LBS | OXYGEN SATURATION: 96 % | BODY MASS INDEX: 33.25 KG/M2 | DIASTOLIC BLOOD PRESSURE: 88 MMHG | HEART RATE: 66 BPM | SYSTOLIC BLOOD PRESSURE: 150 MMHG

## 2022-09-08 DIAGNOSIS — R00.0 TACHYCARDIA: ICD-10-CM

## 2022-09-08 DIAGNOSIS — E78.00 PURE HYPERCHOLESTEROLEMIA: ICD-10-CM

## 2022-09-08 DIAGNOSIS — I10 ESSENTIAL HYPERTENSION, BENIGN: Primary | ICD-10-CM

## 2022-09-08 PROCEDURE — G8417 CALC BMI ABV UP PARAM F/U: HCPCS | Performed by: FAMILY MEDICINE

## 2022-09-08 PROCEDURE — 99214 OFFICE O/P EST MOD 30 MIN: CPT | Performed by: FAMILY MEDICINE

## 2022-09-08 PROCEDURE — 1123F ACP DISCUSS/DSCN MKR DOCD: CPT | Performed by: FAMILY MEDICINE

## 2022-09-08 PROCEDURE — 1090F PRES/ABSN URINE INCON ASSESS: CPT | Performed by: FAMILY MEDICINE

## 2022-09-08 PROCEDURE — 1036F TOBACCO NON-USER: CPT | Performed by: FAMILY MEDICINE

## 2022-09-08 PROCEDURE — G8400 PT W/DXA NO RESULTS DOC: HCPCS | Performed by: FAMILY MEDICINE

## 2022-09-08 PROCEDURE — G8427 DOCREV CUR MEDS BY ELIG CLIN: HCPCS | Performed by: FAMILY MEDICINE

## 2022-09-08 SDOH — ECONOMIC STABILITY: FOOD INSECURITY: WITHIN THE PAST 12 MONTHS, THE FOOD YOU BOUGHT JUST DIDN'T LAST AND YOU DIDN'T HAVE MONEY TO GET MORE.: NEVER TRUE

## 2022-09-08 SDOH — ECONOMIC STABILITY: FOOD INSECURITY: WITHIN THE PAST 12 MONTHS, YOU WORRIED THAT YOUR FOOD WOULD RUN OUT BEFORE YOU GOT MONEY TO BUY MORE.: NEVER TRUE

## 2022-09-08 ASSESSMENT — ENCOUNTER SYMPTOMS
DIARRHEA: 0
EYE DISCHARGE: 0
EYE REDNESS: 0
CONSTIPATION: 0
BLOOD IN STOOL: 0
ABDOMINAL PAIN: 0
VOMITING: 0
COUGH: 0
SHORTNESS OF BREATH: 0
NAUSEA: 0

## 2022-09-08 ASSESSMENT — SOCIAL DETERMINANTS OF HEALTH (SDOH): HOW HARD IS IT FOR YOU TO PAY FOR THE VERY BASICS LIKE FOOD, HOUSING, MEDICAL CARE, AND HEATING?: NOT HARD AT ALL

## 2022-09-08 NOTE — ED PROVIDER NOTES
[facility]  eMERGENCY dEPARTMENT eNCOUnter          CHIEF COMPLAINT       Chief Complaint   Patient presents with    Wound Check     Right back of leg bleeding from having a mole removed yesterday       Nurses Notes reviewed and I agree except as noted in the HPI. HISTORY OF PRESENT ILLNESS    Karishma Mcmanus is a 65 y.o. female who presents to the emergency room via private vehicle, patient had a biopsy performed on her right posterior leg day prior, states initially sent having problems however when today she noticed bleeding from the wound site. Patient denies any trauma or falls or anything disrupting the bandage. Patient is noted be on aspirin and Plavix. REVIEW OF SYSTEMS     Review of Systems   All other systems reviewed and are negative. PAST MEDICAL HISTORY    has a past medical history of Hyperlipidemia, Hypertension, and Kidney stone. SURGICAL HISTORY      has a past surgical history that includes Tubal ligation; Appendectomy; Colonoscopy (2009); Cholecystectomy (10/09/14); and Cardiac surgery. CURRENT MEDICATIONS       Discharge Medication List as of 9/7/2022 10:02 AM        CONTINUE these medications which have NOT CHANGED    Details   clopidogrel (PLAVIX) 75 MG tablet Take 75 mg by mouth dailyHistorical Med      atorvastatin (LIPITOR) 80 MG tablet Take 80 mg by mouth dailyHistorical Med      aspirin 81 MG EC tablet Take 81 mg by mouth dailyHistorical Med      hydroCHLOROthiazide (HYDRODIURIL) 25 MG tablet TAKE 1 TABLET BY MOUTH EVERY DAY, Disp-30 tablet, R-5We are requesting this refill to have on file for next month s order. Normal      metoprolol succinate (TOPROL XL) 50 MG extended release tablet TAKE 1 TABLET BY MOUTH TWICE DAILY, Disp-60 tablet, R-5We are requesting this refill to have on file for next month s order. Normal      zinc gluconate 50 MG tablet Take 50 mg by mouth dailyHistorical Med      vitamin D (CHOLECALCIFEROL) 1000 UNIT TABS tablet Take 1,000 Units by mouth dailyHistorical Med      Misc Natural Products (GLUCOSAMINE CHONDROITIN ADV) TABS 2 TIMES DAILY, Until Discontinued, Historical MedGlucosamine 1500mg / Chondroitin 1200mg       Calcium-Vitamin D (CALTRATE 600 PLUS-VIT D PO) Take 2 tablets by mouth daily. ascorbic acid (VITAMIN C) 500 MG tablet Take 500 mg by mouth daily. vitamin E 400 UNIT capsule Take 400 Units by mouth daily. ALLERGIES     has No Known Allergies. FAMILY HISTORY     She indicated that the status of her mother is unknown. She indicated that the status of her father is unknown. She indicated that the status of her sister is unknown. She indicated that the status of her brother is unknown.   family history includes Cancer in her brother; Heart Attack in her father; Heart Disease (age of onset: 61) in her father; High Blood Pressure in her brother, mother, and sister; High Cholesterol in her brother and sister. SOCIAL HISTORY      reports that she has never smoked. She has never used smokeless tobacco. She reports that she does not drink alcohol and does not use drugs. PHYSICAL EXAM     INITIAL VITALS:  height is 5' 1\" (1.549 m) and weight is 177 lb 8 oz (80.5 kg). Her oral temperature is 98.3 °F (36.8 °C). Her blood pressure is 151/97 (abnormal) and her pulse is 82. Her respiration is 18 and oxygen saturation is 93%. Physical Exam   Constitutional: Patient is oriented to person, place, and time. Patient appears well-developed and well-nourished. Patient is active and cooperative. Neck: Full passive range of motion without pain and phonation normal.   Cardiovascular:  Normal rate, regular rhythm and intact distal pulses. Pulses: Right radial pulse  2+   Pulmonary/Chest: Effort normal. No tachypnea and no bradypnea. No wheezes, rhonchi, or rales. Abdominal: Soft.  Patient without distension   Musculoskeletal:   Focused examination patient's right posterior leg, shows a 1.2 cm roughly circular area of skin removal, consistent with likely shave biopsy, there is a small punctate bleed coming from the central section, no surrounding erythema    Except as otherwise noted, negative acute trauma or deformity,  apparent full range of motion and normal strength all extremities appropriate to age. Neurological: Patient is alert and oriented to person, place, and time. patient displays no tremor. Patient displays no seizure activity. .    Skin: Skin is warm and dry. Patient is not diaphoretic. Psychiatric: Patient has a normal mood and pleasant affect. Patient speech is normal and behavior is normal. Cognition and memory are normal.     DIFFERENTIAL DIAGNOSIS:   Postsurgical bleed    DIAGNOSTIC RESULTS           RADIOLOGY: non-plain film images(s) such as CT, Ultrasound and MRI are read by the radiologist.  No orders to display       LABS:   Labs Reviewed - No data to display    EMERGENCY DEPARTMENT COURSE:   Vitals:    Vitals:    09/07/22 0858 09/07/22 0859 09/07/22 0945   BP:   (!) 151/97   Pulse: 82     Resp: 18     Temp: 98.3 °F (36.8 °C)     TempSrc: Oral     SpO2: 94%  93%   Weight:  177 lb 8 oz (80.5 kg)    Height:  5' 1\" (1.549 m)      Patient history and physical exam taken at bedside, discussed patient symptoms and exam findings, will place Surgicel and 4 x 4 pack over the wound with Ace wrap to hold in place and reevaluate after 30 minutes, acknowledged    Wound was reevaluated, there is a very tiny trace amount of bleed from note from the inferior border, otherwise wound appears of stop bleeding, will place Surgicel and 4 x 4, discharge patient home, we discussed keeping in place for the remainder of the day, when she does take it off to be very careful with very gentle cleaning in the shower, patient to follow-up with her dermatologist, return to ER symptoms change worse other concerns, acknowledged    FINAL IMPRESSION      1.  Visit for wound check          DISPOSITION/PLAN   D/c    PATIENT REFERRED TO:  Follow up with your Dermatologist    Call       Don Pereira MD  [address] [facility] [address]  [phone]    Call   As needed    [facility] ED  [address] [facility] [address]  [phone]    As needed, If symptoms worsen      DISCHARGE MEDICATIONS:  Discharge Medication List as of 9/7/2022 10:02 AM              Summation      Patient Course:  d/c    ED Medications administered this visit:  Medications - No data to display    New Prescriptions from this visit:    Discharge Medication List as of 9/7/2022 10:02 AM          Follow-up:  Follow up with your Dermatologist    Call       Don Pereira MD  [address] [facility] [address]  [phone]    Call   As needed    [facility] ED  [address] [facility] [address]  [phone]    As needed, If symptoms worsen        Final Impression:   1.  Visit for wound check               (Please note that portions of this note were completed with a voice recognition program.  Efforts were made to edit the dictations but occasionally words are mis-transcribed.)    MD Cindi Alejandro MD  09/08/22 0414

## 2022-09-08 NOTE — PROGRESS NOTES
HPI Notes    Name: Keena Muniz  : 1945        Chief Complaint:     Chief Complaint   Patient presents with    Hypertension    Tachycardia       History of Present Illness:     Keena Muniz is a 65 y.o.  female who presents with Hypertension and Tachycardia      Hypertension  This is a chronic problem. The current episode started more than 1 year ago. The problem is unchanged. The problem is controlled (pt checks BP daily at home 3 times per day actually and brings in results range 109-124/70s). Pertinent negatives include no anxiety, chest pain, headaches, palpitations, peripheral edema or shortness of breath. There are no associated agents to hypertension. Risk factors for coronary artery disease include post-menopausal state. The current treatment provides significant improvement. Tachycardia  This is a chronic problem. The current episode started more than 1 year ago. The problem occurs intermittently. The problem has been unchanged. Pertinent negatives include no abdominal pain, chest pain, chills, congestion, coughing, fatigue, fever, headaches, joint swelling, nausea, rash or vomiting. Treatments tried: toprol. Hyperlipidemia  This is a chronic problem. The current episode started more than 1 year ago. The problem is controlled. Recent lipid tests were reviewed and are normal. She has no history of diabetes. Pertinent negatives include no chest pain or shortness of breath. Current antihyperlipidemic treatment includes statins. The current treatment provides significant improvement of lipids. Risk factors for coronary artery disease include dyslipidemia and hypertension.      Past Medical History:     Past Medical History:   Diagnosis Date    Hyperlipidemia     Hypertension     Kidney stone       Reviewed all health maintenance requirements and ordered appropriate tests  Health Maintenance Due   Topic Date Due    COVID-19 Vaccine (1) Never done    Hepatitis C screen  Never done DTaP/Tdap/Td vaccine (1 - Tdap) Never done    Shingles vaccine (1 of 2) Never done    DEXA (modify frequency per FRAX score)  Never done    Flu vaccine (1) 09/01/2022       Past Surgical History:     Past Surgical History:   Procedure Laterality Date    APPENDECTOMY      CARDIAC SURGERY      CHOLECYSTECTOMY  10/09/14    laparoscopy    COLONOSCOPY  2009    [facility]    TUBAL LIGATION          Medications:       Prior to Admission medications    Medication Sig Start Date End Date Taking? Authorizing Provider   clopidogrel (PLAVIX) 75 MG tablet Take 75 mg by mouth daily   Yes Historical Provider, MD   aspirin 81 MG EC tablet Take 81 mg by mouth daily   Yes Historical Provider, MD   hydroCHLOROthiazide (HYDRODIURIL) 25 MG tablet TAKE 1 TABLET BY MOUTH EVERY DAY 8/3/22  Yes Garret Macdonald MD   metoprolol succinate (TOPROL XL) 50 MG extended release tablet TAKE 1 TABLET BY MOUTH TWICE DAILY 5/9/22  Yes Garret Macdonald MD   atorvastatin (LIPITOR) 80 MG tablet Take 1 tablet by mouth daily 11/17/21 9/8/22 Yes Darren Addison MD   zinc gluconate 50 MG tablet Take 50 mg by mouth daily   Yes Historical Provider, MD   vitamin D (CHOLECALCIFEROL) 1000 UNIT TABS tablet Take 1,000 Units by mouth daily   Yes Historical Provider, MD   Misc Natural Products (GLUCOSAMINE CHONDROITIN ADV) TABS Take 1 tablet by mouth 2 times daily. Glucosamine 1500mg / Chondroitin 1200mg   Yes Historical Provider, MD   Calcium-Vitamin D (CALTRATE 600 PLUS-VIT D PO) Take 2 tablets by mouth daily. Yes Historical Provider, MD   ascorbic acid (VITAMIN C) 500 MG tablet Take 500 mg by mouth daily. Yes Historical Provider, MD   vitamin E 400 UNIT capsule Take 400 Units by mouth daily. Yes Historical Provider, MD        Allergies:       Patient has no known allergies. Social History:     Tobacco:    reports that she has never smoked. She has never used smokeless tobacco.  Alcohol:      reports no history of alcohol use.   Drug Use:  reports no history of drug use. Family History:     Family History   Problem Relation Age of Onset    High Blood Pressure Mother     Heart Disease Father 61    Heart Attack Father     High Blood Pressure Sister     High Cholesterol Sister     Cancer Brother         colon    High Blood Pressure Brother     High Cholesterol Brother        Review of Systems:       Review of Systems   Constitutional:  Negative for chills, fatigue, fever and unexpected weight change. HENT:  Negative for congestion. Eyes:  Negative for discharge and redness. Respiratory:  Negative for cough and shortness of breath. Cardiovascular:  Negative for chest pain and palpitations. Gastrointestinal:  Negative for abdominal pain, blood in stool, constipation, diarrhea, nausea and vomiting. Genitourinary:  Negative for dysuria and hematuria. Musculoskeletal:  Negative for joint swelling and neck stiffness. Skin:  Negative for rash. Neurological:  Negative for dizziness, light-headedness and headaches. Psychiatric/Behavioral:  Negative for sleep disturbance. Physical Exam:     Physical Exam  Vitals reviewed. Constitutional:       General: She is not in acute distress. Appearance: Normal appearance. She is well-developed. She is not ill-appearing. HENT:      Head: Normocephalic and atraumatic. Eyes:      General:         Right eye: No discharge. Left eye: No discharge. Conjunctiva/sclera: Conjunctivae normal.   Neck:      Thyroid: No thyromegaly. Cardiovascular:      Rate and Rhythm: Normal rate and regular rhythm. Heart sounds: Normal heart sounds. No murmur heard. Pulmonary:      Effort: Pulmonary effort is normal.      Breath sounds: Normal breath sounds. Abdominal:      General: Bowel sounds are normal.      Palpations: Abdomen is soft. Tenderness: There is no abdominal tenderness. Musculoskeletal:      Cervical back: Neck supple. Right lower leg: No edema. Left lower leg: No edema. Lymphadenopathy:      Cervical: No cervical adenopathy. Skin:     Findings: No erythema or rash. Neurological:      General: No focal deficit present. Mental Status: She is alert and oriented to person, place, and time. Psychiatric:         Mood and Affect: Mood normal.         Behavior: Behavior normal.       Vitals:  BP (!) 150/88 (Site: Left Upper Arm, Cuff Size: Medium Adult)   Pulse 66   Wt 176 lb (79.8 kg)   SpO2 96%   BMI 33.25 kg/m²       Data:     Lab Results   Component Value Date/Time     02/10/2022 08:38 AM    K 3.9 02/10/2022 08:38 AM     02/10/2022 08:38 AM    CO2 31 02/10/2022 08:38 AM    BUN 12 02/10/2022 08:38 AM    CREATININE 0.62 02/10/2022 08:38 AM    GLUCOSE 106 02/10/2022 08:38 AM    GLUCOSE 102 03/02/2012 08:42 AM    PROT 7.1 02/10/2022 08:38 AM    LABALBU 4.1 02/10/2022 08:38 AM    LABALBU 4.6 03/02/2012 08:42 AM    BILITOT 1.97 02/10/2022 08:38 AM    ALKPHOS 61 02/10/2022 08:38 AM    AST 37 02/10/2022 08:38 AM    ALT 38 02/10/2022 08:38 AM     Lab Results   Component Value Date/Time    WBC 5.2 02/10/2022 08:38 AM    RBC 4.92 02/10/2022 08:38 AM    HGB 14.7 02/10/2022 08:38 AM    HCT 43.5 02/10/2022 08:38 AM    MCV 88.4 02/10/2022 08:38 AM    MCH 29.9 02/10/2022 08:38 AM    MCHC 33.8 02/10/2022 08:38 AM    RDW 13.4 02/10/2022 08:38 AM     02/10/2022 08:38 AM    MPV NOT REPORTED 02/10/2022 08:38 AM     Lab Results   Component Value Date/Time    TSH 1.92 02/10/2022 08:38 AM     Lab Results   Component Value Date/Time    CHOL 96 02/10/2022 08:38 AM    HDL 43 02/10/2022 08:38 AM    LABA1C 4.8 08/25/2021 08:54 AM          Assessment/Plan:        1. Essential hypertension, benign  Stable on toprol and HCTZ    2. Tachycardia  Stable on toprol     3.  Pure hypercholesterolemia  Stable on lipitor and labs in Nov with Dr Selena Manzo received counseling on the following healthy behaviors: nutrition and exercise  Reviewed prior labs and health maintenance  Continue current medications, diet and exercise. Discussed use, benefit, and side effects of prescribed medications. Barriers to medication compliance addressed. Patient given educational materials - see patient instructions  Was a self-tracking handout given in paper form or via [employer]? Yes    Requested Prescriptions      No prescriptions requested or ordered in this encounter       All patient questions answered. Patient voiced understanding. Quality Measures    Body mass index is 33.25 kg/m². Elevated. Weight control planned discussed Healthy diet and regular exercise. BP: (!) 150/88. Blood pressure is Normal. Treatment plan consists of  pt checks BP at home  and No treatment change needed. Fall Risk 3/3/2022 3/2/2021 9/3/2019 3/6/2018 9/6/2016 9/1/2015 9/3/2014   2 or more falls in past year? no no yes no no no no   Fall with injury in past year? no no no no no no no     The patient does not have a history of falls. I did not - not indicated , complete a risk assessment for falls. A plan of care for falls No Treatment plan indicated    Lab Results   Component Value Date    LDLCHOLESTEROL 36 02/10/2022    (goal LDL reduction with dx if diabetes is 50% LDL reduction)    PHQ Scores 3/3/2022 3/2/2021 3/3/2020 9/3/2019 3/5/2019 3/6/2018 9/6/2016   PHQ2 Score 0 0 0 0 0 0 0   PHQ9 Score 0 0 0 0 0 0 0     Interpretation of Total Score Depression Severity: 1-4 = Minimal depression, 5-9 = Mild depression, 10-14 = Moderate depression, 15-19 = Moderately severe depression, 20-27 = Severe depression      Return in about 6 months (around 3/8/2023).       Electronically signed by Annette Bermudez MD on 9/8/2022 at 10:55 AM

## 2022-11-03 ENCOUNTER — HOSPITAL ENCOUNTER (OUTPATIENT)
Age: 77
Discharge: HOME | End: 2022-11-05
Payer: MEDICARE

## 2022-11-03 ENCOUNTER — HOSPITAL ENCOUNTER (OUTPATIENT)
Age: 77
Discharge: HOME | End: 2022-11-03
Payer: MEDICARE

## 2022-11-03 ENCOUNTER — HOSPITAL ENCOUNTER (OUTPATIENT)
Dept: GENERAL RADIOLOGY | Age: 77
Discharge: HOME | End: 2022-11-05
Payer: MEDICARE

## 2022-11-03 DIAGNOSIS — I10 ESSENTIAL HYPERTENSION, BENIGN: ICD-10-CM

## 2022-11-03 DIAGNOSIS — E55.9 VITAMIN D DEFICIENCY DISEASE: ICD-10-CM

## 2022-11-03 DIAGNOSIS — I25.10 CORONARY ARTERY DISEASE INVOLVING NATIVE HEART WITHOUT ANGINA PECTORIS, UNSPECIFIED VESSEL OR LESION TYPE: ICD-10-CM

## 2022-11-03 DIAGNOSIS — E78.5 HYPERLIPIDEMIA, UNSPECIFIED HYPERLIPIDEMIA TYPE: ICD-10-CM

## 2022-11-03 LAB
ABSOLUTE EOS #: 0.4 K/UL (ref 0–0.4)
ABSOLUTE LYMPH #: 1.5 K/UL (ref 1–4.8)
ABSOLUTE MONO #: 0.7 K/UL (ref 0–1)
ALBUMIN SERPL-MCNC: 3.9 G/DL (ref 3.5–5.2)
ALP BLD-CCNC: 70 U/L (ref 35–104)
ALT SERPL-CCNC: 18 U/L (ref 5–33)
ANION GAP SERPL CALCULATED.3IONS-SCNC: 7 MMOL/L (ref 9–17)
AST SERPL-CCNC: 21 U/L
BASOPHILS # BLD: 1 % (ref 0–2)
BASOPHILS ABSOLUTE: 0 K/UL (ref 0–0.2)
BILIRUB SERPL-MCNC: 2.3 MG/DL (ref 0.3–1.2)
BUN BLDV-MCNC: 12 MG/DL (ref 8–23)
BUN/CREAT BLD: 20 (ref 9–20)
CALCIUM SERPL-MCNC: 9.4 MG/DL (ref 8.6–10.4)
CHLORIDE BLD-SCNC: 102 MMOL/L (ref 98–107)
CHOLESTEROL/HDL RATIO: 2.4
CHOLESTEROL: 99 MG/DL
CO2: 34 MMOL/L (ref 20–31)
CREAT SERPL-MCNC: 0.59 MG/DL (ref 0.5–0.9)
DIFFERENTIAL TYPE: YES
EOSINOPHILS RELATIVE PERCENT: 7 % (ref 0–5)
GFR SERPL CREATININE-BSD FRML MDRD: >60 ML/MIN/1.73M2
GLUCOSE BLD-MCNC: 116 MG/DL (ref 70–99)
HCT VFR BLD CALC: 43.1 % (ref 36–46)
HDLC SERPL-MCNC: 41 MG/DL
HEMOGLOBIN: 14.7 G/DL (ref 12–16)
LDL CHOLESTEROL: 35 MG/DL (ref 0–130)
LYMPHOCYTES # BLD: 24 % (ref 15–40)
MAGNESIUM: 2.1 MG/DL (ref 1.6–2.6)
MCH RBC QN AUTO: 29.8 PG (ref 26–34)
MCHC RBC AUTO-ENTMCNC: 34.1 G/DL (ref 31–37)
MCV RBC AUTO: 87.2 FL (ref 80–100)
MONOCYTES # BLD: 11 % (ref 4–8)
PATIENT FASTING?: YES
PDW BLD-RTO: 12.8 % (ref 12.1–15.2)
PLATELET # BLD: 228 K/UL (ref 140–450)
POTASSIUM SERPL-SCNC: 3.8 MMOL/L (ref 3.7–5.3)
RBC # BLD: 4.94 M/UL (ref 4–5.2)
SEG NEUTROPHILS: 57 % (ref 47–75)
SEGMENTED NEUTROPHILS ABSOLUTE COUNT: 3.6 K/UL (ref 2.5–7)
SODIUM BLD-SCNC: 143 MMOL/L (ref 135–144)
TOTAL PROTEIN: 6.4 G/DL (ref 6.4–8.3)
TRIGL SERPL-MCNC: 113 MG/DL
TSH SERPL DL<=0.05 MIU/L-ACNC: 2.33 UIU/ML (ref 0.3–5)
VITAMIN D 25-HYDROXY: 37.7 NG/ML
WBC # BLD: 6.3 K/UL (ref 3.5–11)

## 2022-11-03 PROCEDURE — 93005 ELECTROCARDIOGRAM TRACING: CPT

## 2022-11-03 PROCEDURE — 36415 COLL VENOUS BLD VENIPUNCTURE: CPT

## 2022-11-03 PROCEDURE — 82306 VITAMIN D 25 HYDROXY: CPT

## 2022-11-03 PROCEDURE — 85025 COMPLETE CBC W/AUTO DIFF WBC: CPT

## 2022-11-03 PROCEDURE — 83735 ASSAY OF MAGNESIUM: CPT

## 2022-11-03 PROCEDURE — 80061 LIPID PANEL: CPT

## 2022-11-03 PROCEDURE — 84443 ASSAY THYROID STIM HORMONE: CPT

## 2022-11-03 PROCEDURE — 71046 X-RAY EXAM CHEST 2 VIEWS: CPT

## 2022-11-03 PROCEDURE — 80053 COMPREHEN METABOLIC PANEL: CPT

## 2022-11-04 LAB
EKG ATRIAL RATE: 72 BPM
EKG P AXIS: 51 DEGREES
EKG P-R INTERVAL: 198 MS
EKG Q-T INTERVAL: 380 MS
EKG QRS DURATION: 78 MS
EKG QTC CALCULATION (BAZETT): 416 MS
EKG R AXIS: 39 DEGREES
EKG T AXIS: 48 DEGREES
EKG VENTRICULAR RATE: 72 BPM

## 2022-11-04 PROCEDURE — 93010 ELECTROCARDIOGRAM REPORT: CPT | Performed by: INTERNAL MEDICINE

## 2022-11-07 RX ORDER — CLOPIDOGREL BISULFATE 75 MG/1
TABLET ORAL
Qty: 30 TABLET | Refills: 5 | Status: SHIPPED | OUTPATIENT
Start: 2022-11-07 | End: 2022-11-10 | Stop reason: ALTCHOICE

## 2022-11-07 RX ORDER — METOPROLOL SUCCINATE 50 MG/1
TABLET, EXTENDED RELEASE ORAL
Qty: 60 TABLET | Refills: 5 | Status: SHIPPED | OUTPATIENT
Start: 2022-11-07

## 2022-11-07 RX ORDER — ATORVASTATIN CALCIUM 80 MG/1
80 TABLET, FILM COATED ORAL DAILY
Qty: 30 TABLET | Refills: 11 | Status: SHIPPED | OUTPATIENT
Start: 2022-11-07 | End: 2022-12-07

## 2022-11-07 NOTE — TELEPHONE ENCOUNTER
Last OV: 9/8/2022  chronic   Last RX:    Next scheduled apt: 3/9/2023  6 month         Surescript requesting a refill

## 2022-11-10 ENCOUNTER — OFFICE VISIT (OUTPATIENT)
Dept: CARDIOLOGY CLINIC | Age: 77
End: 2022-11-10
Payer: MEDICARE

## 2022-11-10 VITALS
WEIGHT: 180 LBS | DIASTOLIC BLOOD PRESSURE: 90 MMHG | BODY MASS INDEX: 34.01 KG/M2 | SYSTOLIC BLOOD PRESSURE: 190 MMHG | HEART RATE: 76 BPM | OXYGEN SATURATION: 93 %

## 2022-11-10 DIAGNOSIS — I25.10 CORONARY ARTERY DISEASE INVOLVING NATIVE HEART WITHOUT ANGINA PECTORIS, UNSPECIFIED VESSEL OR LESION TYPE: ICD-10-CM

## 2022-11-10 DIAGNOSIS — I25.42 SPONTANEOUS DISSECTION OF CORONARY ARTERY: ICD-10-CM

## 2022-11-10 DIAGNOSIS — E78.5 HYPERLIPIDEMIA, UNSPECIFIED HYPERLIPIDEMIA TYPE: ICD-10-CM

## 2022-11-10 DIAGNOSIS — I10 ESSENTIAL HYPERTENSION, BENIGN: Primary | ICD-10-CM

## 2022-11-10 PROCEDURE — 3078F DIAST BP <80 MM HG: CPT | Performed by: INTERNAL MEDICINE

## 2022-11-10 PROCEDURE — 3074F SYST BP LT 130 MM HG: CPT | Performed by: INTERNAL MEDICINE

## 2022-11-10 PROCEDURE — 1090F PRES/ABSN URINE INCON ASSESS: CPT | Performed by: INTERNAL MEDICINE

## 2022-11-10 PROCEDURE — 1036F TOBACCO NON-USER: CPT | Performed by: INTERNAL MEDICINE

## 2022-11-10 PROCEDURE — 1123F ACP DISCUSS/DSCN MKR DOCD: CPT | Performed by: INTERNAL MEDICINE

## 2022-11-10 PROCEDURE — 99214 OFFICE O/P EST MOD 30 MIN: CPT | Performed by: INTERNAL MEDICINE

## 2022-11-10 PROCEDURE — G8484 FLU IMMUNIZE NO ADMIN: HCPCS | Performed by: INTERNAL MEDICINE

## 2022-11-10 PROCEDURE — G8400 PT W/DXA NO RESULTS DOC: HCPCS | Performed by: INTERNAL MEDICINE

## 2022-11-10 PROCEDURE — G8427 DOCREV CUR MEDS BY ELIG CLIN: HCPCS | Performed by: INTERNAL MEDICINE

## 2022-11-10 PROCEDURE — G8417 CALC BMI ABV UP PARAM F/U: HCPCS | Performed by: INTERNAL MEDICINE

## 2022-11-10 NOTE — PROGRESS NOTES
Ov DR Ahmet Abreu 8 mth follow up   No chest pain or sob  No dizziness feels weak if bp is lower. No hospitalizations or procedures  Since seen. Had mole removed in September  Ended up bleeding, went to ED. Will stop plavix. Will see in 1 year.

## 2022-11-10 NOTE — LETTER
Bita Hernandez MD  [facility]  [address], [place] [address]  [phone]      November 10, 2022      [place] [address], [place]      RE:   Zelda Harness [facility]  :  1945      Dear Dr. 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 HISTORY OF PRESENT ILLNESS:  I had the pleasure of seeing Mrs. Alexandra in our office on 11/10/2022. She is a pleasant 77-year-old female who presented to the emergency room with chest pain on 2021. Her enzymes were positive and she was transferred to [facility] where she had a catheterization by Dr. Minal Bueno that showed a spontaneous dissection of the first OM. Her LAD and right coronary artery were normal, EF of 55% to 60%. She went through cardiac rehab and really denied any chest pain or chest discomfort. Overall, she feels good. Denies any chest pain. Her energy level has been good and no unusual shortness of breath. She is not on an exercise program.  She has had no hospitalizations or procedures. She does have a history of SVT, although this has been well controlled. CARDIAC RISK FACTORS:  Hypertension:  Positive. Hyperlipidemia:  Positive. Peripheral Vascular Disease:  Negative. Smoking:  Negative. Diabetes:  Negative. Known CAD:  Negative. Prior MI:  Positive secondary to SCAD. MEDICATIONS:  She is on Plavix 75 mg daily, vitamin C daily, aspirin 81 mg daily, Lipitor 80 mg daily, HydroDIURIL 25 mg daily, Toprol-XL 50 mg daily, vitamin D 1000 units daily, zinc 50 mg daily. PAST MEDICAL AND SURGICAL HISTORY:  1. Hyperlipidemia, well controlled. 2.  Hypertension. 3.  Kidney stone. 4.  Tubal ligation. 5.  Appendectomy. 6.  Cholecystectomy in .  7.  COVID in 2021.  8.  SVT.   9.  MI secondary to C/O congestion, cough, fever, wheeze x2 days SCAD.    FAMILY HISTORY:  Father had heart disease. Hyperlipidemia in brother and sister. SOCIAL HISTORY:  She is 65years old, . Does not smoke or drink alcohol. Stays active. She is not exercising at this time, she did finish cardiac rehab. REVIEW OF SYSTEMS:  Cardiac as above. Other systems reviewed including constitutional, eyes, ears, nose and throat, cardiovascular, respiratory, GI, , musculoskeletal, integumentary, neurologic, endocrine, hematologic and allergic/immunologic are negative except for what is described above. No weight loss or weight gain. No change in bowel habits. No blood in stools. No fevers, sweats or chills. PHYSICAL EXAMINATION:  VITAL SIGNS:  Her blood pressure was elevated at 173/106 with a heart rate of 76 and regular. Respiratory rate 18. O2 sat 93%. Weight 180 pounds. GENERAL:  She is a pleasant 79-year-old female. Denied pain. She was oriented to person, place and time. Answered questions appropriately. SKIN:  No unusual skin changes. HEENT:  The pupils are equally round and intact. Mucous membranes were dry. NECK:  No JVD. Good carotid pulses. No carotid bruits. No lymphadenopathy or thyromegaly. CARDIOVASCULAR EXAM:  S1 and S2 were normal.  No S3 or S4. Soft systolic blowing type murmur. No diastolic murmur. PMI was normal.  No lift, thrust, or pericardial friction rub. LUNGS:  Clear to auscultation and percussion. ABDOMEN:  Soft and nontender. Good bowel sounds. EXTREMITIES:  Good femoral pulses. Good pedal pulses. No pedal edema. Skin was warm and dry. No calf tenderness. Nail beds pink. Good cap refill. PULSES:  Bilateral symmetrical radial, brachial and carotid pulses. No carotid bruits. Good femoral and pedal pulses. NEUROLOGIC EXAM:  Within normal limits. PSYCHIATRIC EXAM:  Within normal limits.     LABORATORY DATA:  Sodium 143, potassium 2.8, BUN 12, creatinine 0.59, GFR greater than 60, magnesium 2.1, calcium was 9.4.  Cholesterol 99, HDL 41, LDL 35, triglycerides 113. ALT was 18, AST was 21. TSH 2.33. Vitamin D 37.7. White count 6.3, hemoglobin 14.7 with a platelet count of 800,666. EKG showed sinus rhythm and old septal MI. Chest x-ray was unremarkable. IMPRESSION:  1. Status post myocardial infarction on 11/10/2021, secondary to SCAD (spontaneous coronary artery dissection), with dissection of the distal portion of the first OM with normal LAD and circumflex, normal LV function, EF of 55% to 60%. 2.  Hypertension, elevated in our office, but good at home. 3.  Hyperlipidemia, well controlled. 4.  History of SVT. 5.  COVID in 06/2021. PLAN:  1. Stop Plavix. 2.  See in one year. 3.  If any chest pain, shortness of breath and loss of energy, etc., I would want to see her earlier. DISCUSSION:  Mrs. Geo Quinteros overall is doing well. She has had no chest pain or chest discomfort or any unusual shortness of breath. It has been a year since her MI and it is reasonable to stop her Plavix. There is no indication to repeat a stress test or repeat a cardiac catheterization. Thank you very much for allowing me the privilege of seeing Mrs. Alexandra. If you have any questions on my thoughts, please do not hesitate to contact me.      Sincerely,        Jayla Trotter    D: 11/14/2022 20:37:45     T: 11/14/2022 20:41:52     [email or web address]  Job#: [id number]   Doc#: [id number] Graft Donor Site Bandage (Optional-Leave Blank If You Don't Want In Note): Steri-strips and a pressure bandage were applied to the donor site.

## 2022-11-15 NOTE — PROGRESS NOTES
Lottie Jaquez MD  [facility]  [address], [facility]  [phone]      November 10, 2022      Floridalma Doyle MD  [address] [facility], [facility]      RE:   Licha Vargas [facility]  :  1945      Dear Dr. 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 HISTORY OF PRESENT ILLNESS:  I had the pleasure of seeing Mrs. Alexandra in our office on 11/10/2022. She is a pleasant 69-year-old female who presented to the emergency room with chest pain on 2021. Her enzymes were positive and she was transferred to [facility] where she had a catheterization by Dr. Josseline Bobo that showed a spontaneous dissection of the first OM. Her LAD and right coronary artery were normal, EF of 55% to 60%. She went through cardiac rehab and really denied any chest pain or chest discomfort. Overall, she feels good. Denies any chest pain. Her energy level has been good and no unusual shortness of breath. She is not on an exercise program.  She has had no hospitalizations or procedures. She does have a history of SVT, although this has been well controlled. CARDIAC RISK FACTORS:  Hypertension:  Positive. Hyperlipidemia:  Positive. Peripheral Vascular Disease:  Negative. Smoking:  Negative. Diabetes:  Negative. Known CAD:  Negative. Prior MI:  Positive secondary to SCAD. MEDICATIONS:  She is on Plavix 75 mg daily, vitamin C daily, aspirin 81 mg daily, Lipitor 80 mg daily, HydroDIURIL 25 mg daily, Toprol-XL 50 mg daily, vitamin D 1000 units daily, zinc 50 mg daily. PAST MEDICAL AND SURGICAL HISTORY:  1. Hyperlipidemia, well controlled. 2.  Hypertension. 3.  Kidney stone. 4.  Tubal ligation. 5.  Appendectomy. 6.  Cholecystectomy in .  7.  COVID in 2021.  8.  SVT.   9.  MI secondary to SCAD.    FAMILY HISTORY:  Father had heart disease. Hyperlipidemia in brother and sister. SOCIAL HISTORY:  She is 65years old, . Does not smoke or drink alcohol. Stays active. She is not exercising at this time, she did finish cardiac rehab. REVIEW OF SYSTEMS:  Cardiac as above. Other systems reviewed including constitutional, eyes, ears, nose and throat, cardiovascular, respiratory, GI, , musculoskeletal, integumentary, neurologic, endocrine, hematologic and allergic/immunologic are negative except for what is described above. No weight loss or weight gain. No change in bowel habits. No blood in stools. No fevers, sweats or chills. PHYSICAL EXAMINATION:  VITAL SIGNS:  Her blood pressure was elevated at 173/106 with a heart rate of 76 and regular. Respiratory rate 18. O2 sat 93%. Weight 180 pounds. GENERAL:  She is a pleasant 69-year-old female. Denied pain. She was oriented to person, place and time. Answered questions appropriately. SKIN:  No unusual skin changes. HEENT:  The pupils are equally round and intact. Mucous membranes were dry. NECK:  No JVD. Good carotid pulses. No carotid bruits. No lymphadenopathy or thyromegaly. CARDIOVASCULAR EXAM:  S1 and S2 were normal.  No S3 or S4. Soft systolic blowing type murmur. No diastolic murmur. PMI was normal.  No lift, thrust, or pericardial friction rub. LUNGS:  Clear to auscultation and percussion. ABDOMEN:  Soft and nontender. Good bowel sounds. EXTREMITIES:  Good femoral pulses. Good pedal pulses. No pedal edema. Skin was warm and dry. No calf tenderness. Nail beds pink. Good cap refill. PULSES:  Bilateral symmetrical radial, brachial and carotid pulses. No carotid bruits. Good femoral and pedal pulses. NEUROLOGIC EXAM:  Within normal limits. PSYCHIATRIC EXAM:  Within normal limits.     LABORATORY DATA:  Sodium 143, potassium 2.8, BUN 12, creatinine 0.59, GFR greater than 60, magnesium 2.1, calcium was 9.4.  Cholesterol 99, HDL 41, LDL 35, triglycerides 113. ALT was 18, AST was 21. TSH 2.33. Vitamin D 37.7. White count 6.3, hemoglobin 14.7 with a platelet count of 227,821. EKG showed sinus rhythm and old septal MI. Chest x-ray was unremarkable. IMPRESSION:  1. Status post myocardial infarction on 11/10/2021, secondary to SCAD (spontaneous coronary artery dissection), with dissection of the distal portion of the first OM with normal LAD and circumflex, normal LV function, EF of 55% to 60%. 2.  Hypertension, elevated in our office, but good at home. 3.  Hyperlipidemia, well controlled. 4.  History of SVT. 5.  COVID in 06/2021. PLAN:  1. Stop Plavix. 2.  See in one year. 3.  If any chest pain, shortness of breath and loss of energy, etc., I would want to see her earlier. DISCUSSION:  Mrs. Leonora Allen overall is doing well. She has had no chest pain or chest discomfort or any unusual shortness of breath. It has been a year since her MI and it is reasonable to stop her Plavix. There is no indication to repeat a stress test or repeat a cardiac catheterization. Thank you very much for allowing me the privilege of seeing Mrs. Alexandra. If you have any questions on my thoughts, please do not hesitate to contact me.      Sincerely,        Hu Quinones    D: 11/14/2022 20:37:45     T: 11/14/2022 20:41:52     [email or web address]  Job#: [id number]   Doc#: [id number]

## 2023-02-05 DIAGNOSIS — I10 ESSENTIAL HYPERTENSION, BENIGN: ICD-10-CM

## 2023-02-06 RX ORDER — HYDROCHLOROTHIAZIDE 25 MG/1
TABLET ORAL
Qty: 30 TABLET | Refills: 5 | Status: SHIPPED | OUTPATIENT
Start: 2023-02-06

## 2023-03-09 ENCOUNTER — OFFICE VISIT (OUTPATIENT)
Dept: FAMILY MEDICINE CLINIC | Age: 78
End: 2023-03-09

## 2023-03-09 VITALS
SYSTOLIC BLOOD PRESSURE: 136 MMHG | OXYGEN SATURATION: 96 % | HEART RATE: 80 BPM | HEIGHT: 61 IN | DIASTOLIC BLOOD PRESSURE: 86 MMHG | BODY MASS INDEX: 34.17 KG/M2 | WEIGHT: 181 LBS

## 2023-03-09 DIAGNOSIS — R00.0 TACHYCARDIA: ICD-10-CM

## 2023-03-09 DIAGNOSIS — I10 ESSENTIAL HYPERTENSION, BENIGN: Primary | ICD-10-CM

## 2023-03-09 DIAGNOSIS — E78.00 PURE HYPERCHOLESTEROLEMIA: ICD-10-CM

## 2023-03-09 DIAGNOSIS — E28.39 OVARIAN FAILURE: ICD-10-CM

## 2023-03-09 DIAGNOSIS — Z00.00 MEDICARE ANNUAL WELLNESS VISIT, SUBSEQUENT: ICD-10-CM

## 2023-03-09 SDOH — ECONOMIC STABILITY: FOOD INSECURITY: WITHIN THE PAST 12 MONTHS, THE FOOD YOU BOUGHT JUST DIDN'T LAST AND YOU DIDN'T HAVE MONEY TO GET MORE.: NEVER TRUE

## 2023-03-09 SDOH — ECONOMIC STABILITY: INCOME INSECURITY: HOW HARD IS IT FOR YOU TO PAY FOR THE VERY BASICS LIKE FOOD, HOUSING, MEDICAL CARE, AND HEATING?: NOT VERY HARD

## 2023-03-09 SDOH — ECONOMIC STABILITY: FOOD INSECURITY: WITHIN THE PAST 12 MONTHS, YOU WORRIED THAT YOUR FOOD WOULD RUN OUT BEFORE YOU GOT MONEY TO BUY MORE.: NEVER TRUE

## 2023-03-09 SDOH — ECONOMIC STABILITY: HOUSING INSECURITY
IN THE LAST 12 MONTHS, WAS THERE A TIME WHEN YOU DID NOT HAVE A STEADY PLACE TO SLEEP OR SLEPT IN A SHELTER (INCLUDING NOW)?: NO

## 2023-03-09 ASSESSMENT — PATIENT HEALTH QUESTIONNAIRE - PHQ9
2. FEELING DOWN, DEPRESSED OR HOPELESS: 0
SUM OF ALL RESPONSES TO PHQ QUESTIONS 1-9: 0
SUM OF ALL RESPONSES TO PHQ9 QUESTIONS 1 & 2: 0
1. LITTLE INTEREST OR PLEASURE IN DOING THINGS: 0
SUM OF ALL RESPONSES TO PHQ QUESTIONS 1-9: 0

## 2023-03-09 ASSESSMENT — ENCOUNTER SYMPTOMS
CONSTIPATION: 0
NAUSEA: 0
DIARRHEA: 0
ABDOMINAL PAIN: 0
COUGH: 0
EYE REDNESS: 0
BLOOD IN STOOL: 0
EYE DISCHARGE: 0
FACIAL SWELLING: 0
SHORTNESS OF BREATH: 0
VOMITING: 0

## 2023-03-09 ASSESSMENT — LIFESTYLE VARIABLES: HOW MANY STANDARD DRINKS CONTAINING ALCOHOL DO YOU HAVE ON A TYPICAL DAY: PATIENT DOES NOT DRINK

## 2023-03-09 NOTE — PROGRESS NOTES
Medicare Annual Wellness Visit    Vargas Montanez is here for Medicare AWV, Hypertension, Hyperlipidemia, and Tachycardia    Assessment & Plan   Essential hypertension, benign  Pure hypercholesterolemia  Tachycardia  Ovarian failure  Medicare annual wellness visit, subsequent    Recommendations for Preventive Services Due: see orders and patient instructions/AVS.  Recommended screening schedule for the next 5-10 years is provided to the patient in written form: see Patient Instructions/AVS.     Return for Medicare Annual Wellness Visit in 1 year. Subjective   The following acute and/or chronic problems were also addressed today: see 6mos note     Patient's complete Health Risk Assessment and screening values have been reviewed and are found in Flowsheets. The following problems were reviewed today and where indicated follow up appointments were made and/or referrals ordered. Positive Risk Factor Screenings with Interventions:                 Weight and Activity:  Physical Activity: Insufficiently Active    Days of Exercise per Week: 2 days    Minutes of Exercise per Session: 30 min     On average, how many days per week do you engage in moderate to strenuous exercise (like a brisk walk)?: 2 days  Have you lost any weight without trying in the past 3 months?: No  Body mass index: (!) 34.2  Obesity Interventions:  Stable and pt is trying to walk more       Dentist Screen:  Have you seen the dentist within the past year?: (!) No    Intervention:  Patient declines any further evaluation or treatment     Vision Screen:  Do you have difficulty driving, watching TV, or doing any of your daily activities because of your eyesight?: No  Have you had an eye exam within the past year?: (!) No  No results found.     Interventions:   Patient encouraged to make appointment with their eye specialist               Objective   Vitals:    03/09/23 1003   BP: 136/86   Pulse: 80   SpO2: 96%   Weight: 181 lb (82.1 kg)   Height: 5' 1\" (1.549 m)      Body mass index is 34.2 kg/m². PE - see in 6mos       No Known Allergies  Prior to Visit Medications    Medication Sig Taking? Authorizing Provider   hydroCHLOROthiazide (HYDRODIURIL) 25 MG tablet TAKE 1 TABLET BY MOUTH EVERY DAY Yes Bertha Yarbrough MD   metoprolol succinate (TOPROL XL) 50 MG extended release tablet TAKE 1 TABLET BY MOUTH TWICE DAILY Yes Bertha Yarbrough MD   atorvastatin (LIPITOR) 80 MG tablet TAKE 1 TABLET BY MOUTH DAILY Yes Ryan Grimes MD   aspirin 81 MG EC tablet Take 81 mg by mouth daily Yes Historical Provider, MD   zinc gluconate 50 MG tablet Take 50 mg by mouth daily Yes Historical Provider, MD   vitamin D (CHOLECALCIFEROL) 1000 UNIT TABS tablet Take 1,000 Units by mouth daily Yes Historical Provider, MD   Misc Natural Products (GLUCOSAMINE CHONDROITIN ADV) TABS Take 1 tablet by mouth 2 times daily. Glucosamine 1500mg / Chondroitin 1200mg Yes Historical Provider, MD   Calcium-Vitamin D (CALTRATE 600 PLUS-VIT D PO) Take 2 tablets by mouth daily. Yes Historical Provider, MD   ascorbic acid (VITAMIN C) 500 MG tablet Take 500 mg by mouth daily. Yes Historical Provider, MD   vitamin E 400 UNIT capsule Take 400 Units by mouth daily.  Yes Historical Provider, MD Mann (Including outside providers/suppliers regularly involved in providing care):   Patient Care Team:  Bertha Yarbrough MD as PCP - Kate Lovell MD as PCP - Empaneled Provider     Reviewed and updated this visit:  Tobacco  Allergies  Meds  Problems  Med Hx  Surg Hx  Soc Hx  Fam Hx               Bertha Yarbrough MD

## 2023-03-09 NOTE — PATIENT INSTRUCTIONS
Learning About Dental Care for Older Adults  Dental care for older adults: Overview  Dental care for older people is much the same as for younger adults. But older adults do have concerns that younger adults do not. Older adults may have problems with gum disease and decay on the roots of their teeth. They may need missing teeth replaced or broken fillings fixed. Or they may have dentures that need to be cared for. Some older adults may have trouble holding a toothbrush. You can help remind the person you are caring for to brush and floss their teeth or to clean their dentures. In some cases, you may need to do the brushing and other dental care tasks. People who have trouble using their hands or who have dementia may need this extra help. How can you help with dental care? Normal dental care  To keep the teeth and gums healthy:  Brush the teeth with fluoride toothpaste twice a day--in the morning and at night--and floss at least once a day. Plaque can quickly build up on the teeth of older adults. Watch for the signs of gum disease. These signs include gums that bleed after brushing or after eating hard foods, such as apples. See a dentist regularly. Many experts recommend checkups every 6 months. Keep the dentist up to date on any new medications the person is taking. Encourage a balanced diet that includes whole grains, vegetables, and fruits, and that is low in saturated fat and sodium. Encourage the person you're caring for not to use tobacco products. They can affect dental and general health. Many older adults have a fixed income and feel that they can't afford dental care. But most [facility] and [facility] have programs in which dentists help older adults by lowering fees. Contact your area's public health offices or  for information about dental care in your area.   Using a toothbrush  Older adults with arthritis sometimes have trouble brushing their teeth because they can't easily hold the toothbrush. Their hands and fingers may be stiff, painful, or weak. If this is the case, you can: Offer an electric toothbrush. Enlarge the handle of a non-electric toothbrush by wrapping a sponge, an elastic bandage, or adhesive tape around it. Push the toothbrush handle through a ball made of rubber or soft foam.  Make the handle longer and thicker by taping Popsicle sticks or tongue depressors to it. You may also be able to buy special toothbrushes, toothpaste dispensers, and floss holders. Your doctor may recommend a soft-bristle toothbrush if the person you care for bleeds easily. Bleeding can happen because of a health problem or from certain medicines. A toothpaste for sensitive teeth may help if the person you care for has sensitive teeth. How do you brush and floss someone's teeth? If the person you are caring for has a hard time cleaning their teeth on their own, you may need to brush and floss their teeth for them. It may be easiest to have the person sit and face away from you, and to sit or stand behind them. That way you can steady their head against your arm as you reach around to floss and brush their teeth. Choose a place that has good lighting and is comfortable for both of you. Before you begin, gather your supplies. You will need gloves, floss, a toothbrush, and a container to hold water if you are not near a sink. Wash and dry your hands well and put on gloves. Start by flossing:  Gently work a piece of floss between each of the teeth toward the gums. A plastic flossing tool may make this easier, and they are available at most [facility]. Curve the floss around each tooth into a U-shape and gently slide it under the gum line. Move the floss firmly up and down several times to scrape off the plaque. After you've finished flossing, throw away the used floss and begin brushing:  Wet the brush and apply toothpaste. Place the brush at a 45-degree angle where the teeth meet the gums. Press firmly, and move the brush in small circles over the surface of the teeth. Be careful not to brush too hard. Vigorous brushing can make the gums pull away from the teeth and can scratch the tooth enamel. Brush all surfaces of the teeth, on the tongue side and on the cheek side. Pay special attention to the front teeth and all surfaces of the back teeth. Brush chewing surfaces with short back-and-forth strokes. After you've finished, help the person rinse the remaining toothpaste from their mouth. Where can you learn more? Go to http://www.woods.com/ and enter F944 to learn more about \"Learning About Dental Care for Older Adults. \"  Current as of: June 16, 2022               Content Version: 13.5  © 2006-2022 Healthwise, [employer]. Care instructions adapted under license by [facility] ([facility]). If you have questions about a medical condition or this instruction, always ask your healthcare professional. [place] [address] any warranty or liability for your use of this information. Learning About Vision Tests  What are vision tests? The four most common vision tests are visual acuity tests, refraction, visual field tests, and color vision tests. Visual acuity (sharpness) tests  These tests are used: To see if you need glasses or contact lenses. To monitor an eye problem. To check an eye injury. Visual acuity tests are done as part of routine exams. You may also have this test when you get your 's license or apply for some types of jobs. Visual field tests  These tests are used: To check for vision loss in any area of your range of vision. To screen for certain eye diseases. To look for nerve damage after a stroke, head injury, or other problem that could reduce blood flow to the brain. Refraction and color tests  A refraction test is done to find the right prescription for glasses and contact lenses.   A color vision test is done to check for color blindness. Color vision is often tested as part of a routine exam. You may also have this test when you apply for a job where recognizing different colors is important, such as , electronics, or the [employer]. How are vision tests done? Visual acuity test   You cover one eye at a time. You read aloud from a wall chart across the room. You read aloud from a small card that you hold in your hand. Refraction   You look into a special device. The device puts lenses of different strengths in front of each eye to see how strong your glasses or contact lenses need to be. Visual field tests   Your doctor may have you look through special machines. Or your doctor may simply have you stare straight ahead while they move a finger into and out of your field of vision. Color vision test   You look at pieces of printed test patterns in various colors. You say what number or symbol you see. Your doctor may have you trace the number or symbol using a pointer. How do these tests feel? There is very little chance of having a problem from this test. If dilating drops are used for a vision test, they may make the eyes sting and cause a medicine taste in the mouth. Follow-up care is a key part of your treatment and safety. Be sure to make and go to all appointments, and call your doctor if you are having problems. It's also a good idea to know your test results and keep a list of the medicines you take. Where can you learn more? Go to http://www.bertrand.com/ and enter G551 to learn more about \"Learning About Vision Tests. \"  Current as of: October 12, 2022               Content Version: 13.5  © 3020-0218 Healthwise, [employer]. Care instructions adapted under license by [facility] ([facility]). If you have questions about a medical condition or this instruction, always ask your healthcare professional. [place] [address] any warranty or liability for your use of this information.   Advance Directives: Care Instructions  Overview  An advance directive is a legal way to state your wishes at the end of your life. It tells your family and your doctor what to do if you can't say what you want.  There are two main types of advance directives. You can change them any time your wishes change.  Living will.  This form tells your family and your doctor your wishes about life support and other treatment. The form is also called a declaration.  Medical power of .  This form lets you name a person to make treatment decisions for you when you can't speak for yourself. This person is called a health care agent (health care proxy, health care surrogate). The form is also called a durable power of  for health care.  If you do not have an advance directive, decisions about your medical care may be made by a family member, or by a doctor or a  who doesn't know you.  It may help to think of an advance directive as a gift to the people who care for you. If you have one, they won't have to make tough decisions by themselves.  For more information, including forms for your state, see the [employer] website (www.caringinfo.org/planning/advance-directives/).  Follow-up care is a key part of your treatment and safety. Be sure to make and go to all appointments, and call your doctor if you are having problems. It's also a good idea to know your test results and keep a list of the medicines you take.  What should you include in an advance directive?  Many states have a unique advance directive form. (It may ask you to address specific issues.) Or you might use a universal form that's approved by many states.  If your form doesn't tell you what to address, it may be hard to know what to include in your advance directive. Use the questions below to help you get started.  Who do you want to make decisions about your medical care if you are not able to?  What life-support measures do you want if you  have a serious illness that gets worse over time or can't be cured? What are you most afraid of that might happen? (Maybe you're afraid of having pain, losing your independence, or being kept alive by machines.)  Where would you prefer to die? (Your home? A hospital? A nursing home?)  Do you want to donate your organs when you die? Do you want certain Caodaism practices performed before you die? When should you call for help? Be sure to contact your doctor if you have any questions. Where can you learn more? Go to http://www.bertrand.com/ and enter R264 to learn more about \"Advance Directives: Care Instructions. \"  Current as of: June 16, 2022               Content Version: 13.5  © 9678-5306 Healthwise, [employer]. Care instructions adapted under license by [facility] ([facility]). If you have questions about a medical condition or this instruction, always ask your healthcare professional. [place] [address] any warranty or liability for your use of this information. Starting a Weight Loss Plan: Care Instructions  Overview     If you're thinking about losing weight, it can be hard to know where to start. Your doctor can help you set up a weight loss plan that best meets your needs. You may want to take a class on nutrition or exercise, or you could join a weight loss support group. If you have questions about how to make changes to your eating or exercise habits, ask your doctor about seeing a registered dietitian or an exercise specialist.  It can be a big challenge to lose weight. But you don't have to make huge changes at once. Make small changes, and stick with them. When those changes become habit, add a few more changes. If you don't think you're ready to make changes right now, try to pick a date in the future. Make an appointment to see your doctor to discuss whether the time is right for you to start a plan. Follow-up care is a key part of your treatment and safety.  Be sure to make and go to all appointments, and call your doctor if you are having problems. It's also a good idea to know your test results and keep a list of the medicines you take. How can you care for yourself at home? Set realistic goals. Many people expect to lose much more weight than is likely. A weight loss of 5% to 10% of your body weight may be enough to improve your health. Get family and friends involved to provide support. Talk to them about why you are trying to lose weight, and ask them to help. They can help by participating in exercise and having meals with you, even if they may be eating something different. Find what works best for you. If you do not have time or do not like to cook, a program that offers meal replacement bars or shakes may be better for you. Or if you like to prepare meals, finding a plan that includes daily menus and recipes may be best.  Ask your doctor about other health professionals who can help you achieve your weight loss goals. A dietitian can help you make healthy changes in your diet. An exercise specialist or  can help you develop a safe and effective exercise program.  A counselor or psychiatrist can help you cope with issues such as depression, anxiety, or family problems that can make it hard to focus on weight loss. Consider joining a support group for people who are trying to lose weight. Your doctor can suggest groups in your area. Where can you learn more? Go to http://www.woods.com/ and enter U357 to learn more about \"Starting a Weight Loss Plan: Care Instructions. \"  Current as of: August 25, 2022               Content Version: 13.5  © 2006-2022 Healthwise, Incorporated. Care instructions adapted under license by [facility] [employer] [facility] ([facility]).  If you have questions about a medical condition or this instruction, always ask your healthcare professional. [address] any warranty or liability for your use of this information. A Healthy Heart: Care Instructions  Your Care Instructions     Coronary artery disease, also called heart disease, occurs when a substance called plaque builds up in the vessels that supply oxygen-rich blood to your heart muscle. This can narrow the blood vessels and reduce blood flow. A heart attack happens when blood flow is completely blocked. A high-fat diet, smoking, and other factors increase the risk of heart disease. Your doctor has found that you have a chance of having heart disease. You can do lots of things to keep your heart healthy. It may not be easy, but you can change your diet, exercise more, and quit smoking. These steps really work to lower your chance of heart disease. Follow-up care is a key part of your treatment and safety. Be sure to make and go to all appointments, and call your doctor if you are having problems. It's also a good idea to know your test results and keep a list of the medicines you take. How can you care for yourself at home? Diet    Use less salt when you cook and eat. This helps lower your blood pressure. Taste food before salting. Add only a little salt when you think you need it. With time, your taste buds will adjust to less salt.     Eat fewer snack items, fast foods, canned soups, and other high-salt, high-fat, processed foods.     Read food labels and try to avoid saturated and trans fats. They increase your risk of heart disease by raising cholesterol levels.     Limit the amount of solid fat-butter, margarine, and shortening-you eat. Use olive, peanut, or canola oil when you cook. Bake, broil, and steam foods instead of frying them.     Eat a variety of fruit and vegetables every day. Dark green, deep orange, red, or yellow fruits and vegetables are especially good for you. Examples include spinach, carrots, peaches, and berries.     Foods high in fiber can reduce your cholesterol and provide important vitamins and minerals.  High-fiber foods include whole-grain cereals and breads, oatmeal, beans, brown rice, citrus fruits, and apples.     Eat lean proteins. Heart-healthy proteins include seafood, lean meats and poultry, eggs, beans, peas, nuts, seeds, and soy products.     Limit drinks and foods with added sugar. These include candy, desserts, and soda pop. Lifestyle changes    If your doctor recommends it, get more exercise. Walking is a good choice. Bit by bit, increase the amount you walk every day. Try for at least 30 minutes on most days of the week. You also may want to swim, bike, or do other activities.     Do not smoke. If you need help quitting, talk to your doctor about stop-smoking programs and medicines. These can increase your chances of quitting for good. Quitting smoking may be the most important step you can take to protect your heart. It is never too late to quit.     Limit alcohol to 2 drinks a day for men and 1 drink a day for women. Too much alcohol can cause health problems.     Manage other health problems such as diabetes, high blood pressure, and high cholesterol. If you think you may have a problem with alcohol or drug use, talk to your doctor. Medicines    Take your medicines exactly as prescribed. Call your doctor if you think you are having a problem with your medicine.     If your doctor recommends aspirin, take the amount directed each day. Make sure you take aspirin and not another kind of pain reliever, such as acetaminophen (Tylenol). When should you call for help? Call 911 if you have symptoms of a heart attack. These may include:    Chest pain or pressure, or a strange feeling in the chest.     Sweating.     Shortness of breath.     Pain, pressure, or a strange feeling in the back, neck, jaw, or upper belly or in one or both shoulders or arms.     Lightheadedness or sudden weakness.     A fast or irregular heartbeat.    After you call 911, the  may tell you to chew 1 adult-strength or 2 to 4 low-dose aspirin. Wait for an ambulance. Do not try to drive yourself. Watch closely for changes in your health, and be sure to contact your doctor if you have any problems. Where can you learn more? Go to http://www.bertrand.com/ and enter F075 to learn more about \"A Healthy Heart: Care Instructions. \"  Current as of: September 7, 2022               Content Version: 13.5  © 2006-2022 [employer]. Care instructions adapted under license by [facility] ([facility]). If you have questions about a medical condition or this instruction, always ask your healthcare professional. [place] [address] any warranty or liability for your use of this information. Personalized Preventive Plan for Agus Alexandra - 3/9/2023  Medicare offers a range of preventive health benefits. Some of the tests and screenings are paid in full while other may be subject to a deductible, co-insurance, and/or copay. Some of these benefits include a comprehensive review of your medical history including lifestyle, illnesses that may run in your family, and various assessments and screenings as appropriate. After reviewing your medical record and screening and assessments performed today your provider may have ordered immunizations, labs, imaging, and/or referrals for you. A list of these orders (if applicable) as well as your Preventive Care list are included within your After Visit Summary for your review. Other Preventive Recommendations:    A preventive eye exam performed by an eye specialist is recommended every 1-2 years to screen for glaucoma; cataracts, macular degeneration, and other eye disorders. A preventive dental visit is recommended every 6 months. Try to get at least 150 minutes of exercise per week or 10,000 steps per day on a pedometer . Order or download the FREE \"Exercise & Physical Activity: Your Everyday Guide\" from The [employer].  Call 8-381.884.3899 or search The [employer] A[employer] online. You need 0248-9840 mg of calcium and 9131-0892 IU of vitamin D per day. It is possible to meet your calcium requirement with diet alone, but a vitamin D supplement is usually necessary to meet this goal.  When exposed to the sun, use a sunscreen that protects against both UVA and UVB radiation with an SPF of 30 or greater. Reapply every 2 to 3 hours or after sweating, drying off with a towel, or swimming. Always wear a seat belt when traveling in a car. Always wear a helmet when riding a bicycle or motorcycle.

## 2023-03-09 NOTE — PROGRESS NOTES
HPI Notes    Name: Pj Edouard  : 1945        Chief Complaint:     Chief Complaint   Patient presents with    Medicare AWV    Hypertension    Hyperlipidemia    Tachycardia       History of Present Illness:     Pj Edouard is a 77 y.o.  female who presents with Medicare AWV, Hypertension, Hyperlipidemia, and Tachycardia      Hypertension  This is a chronic problem. The current episode started more than 1 year ago. The problem is unchanged. The problem is controlled. Pertinent negatives include no chest pain, headaches, malaise/fatigue, neck pain, palpitations, peripheral edema or shortness of breath. There are no associated agents to hypertension. Risk factors for coronary artery disease include dyslipidemia and post-menopausal state. The current treatment provides significant improvement. Hyperlipidemia  This is a chronic problem. The current episode started more than 1 year ago. The problem is controlled. Recent lipid tests were reviewed and are normal. She has no history of diabetes or hypothyroidism. Pertinent negatives include no chest pain or shortness of breath. Current antihyperlipidemic treatment includes statins. The current treatment provides significant improvement of lipids. Risk factors for coronary artery disease include dyslipidemia and hypertension. Tachycardia  This is a chronic problem. The current episode started more than 1 year ago. The problem has been unchanged. Pertinent negatives include no abdominal pain, chest pain, chills, coughing, fatigue, fever, headaches, joint swelling, nausea, neck pain, rash or vomiting. Treatments tried: toprol.      Past Medical History:     Past Medical History:   Diagnosis Date    Hyperlipidemia     Hypertension     Kidney stone       Reviewed all health maintenance requirements and ordered appropriate tests  Health Maintenance Due   Topic Date Due    COVID-19 Vaccine (1) Never done    Hepatitis C screen  Never done    DTaP/Tdap/Td vaccine (1 - Tdap) Never done    Shingles vaccine (1 of 2) Never done    DEXA (modify frequency per FRAX score)  Never done    Flu vaccine (1) 08/01/2022    Depression Screen  03/03/2023       Past Surgical History:     Past Surgical History:   Procedure Laterality Date    APPENDECTOMY      CARDIAC SURGERY      CHOLECYSTECTOMY  10/09/14    laparoscopy    COLONOSCOPY  2009    [facility]    TUBAL LIGATION          Medications:       Prior to Admission medications    Medication Sig Start Date End Date Taking? Authorizing Provider   hydroCHLOROthiazide (HYDRODIURIL) 25 MG tablet TAKE 1 TABLET BY MOUTH EVERY DAY 2/6/23  Yes Vanessa Krause MD   metoprolol succinate (TOPROL XL) 50 MG extended release tablet TAKE 1 TABLET BY MOUTH TWICE DAILY 11/7/22  Yes Vanessa Krause MD   atorvastatin (LIPITOR) 80 MG tablet TAKE 1 TABLET BY MOUTH DAILY 11/7/22 3/9/23 Yes Nasir Munoz MD   aspirin 81 MG EC tablet Take 81 mg by mouth daily   Yes Historical Provider, MD   zinc gluconate 50 MG tablet Take 50 mg by mouth daily   Yes Historical Provider, MD   vitamin D (CHOLECALCIFEROL) 1000 UNIT TABS tablet Take 1,000 Units by mouth daily   Yes Historical Provider, MD   Misc Natural Products (GLUCOSAMINE CHONDROITIN ADV) TABS Take 1 tablet by mouth 2 times daily. Glucosamine 1500mg / Chondroitin 1200mg   Yes Historical Provider, MD   Calcium-Vitamin D (CALTRATE 600 PLUS-VIT D PO) Take 2 tablets by mouth daily. Yes Historical Provider, MD   ascorbic acid (VITAMIN C) 500 MG tablet Take 500 mg by mouth daily. Yes Historical Provider, MD   vitamin E 400 UNIT capsule Take 400 Units by mouth daily. Yes Historical Provider, MD        Allergies:       Patient has no known allergies. Social History:     Tobacco:    reports that she has never smoked. She has never used smokeless tobacco.  Alcohol:      reports no history of alcohol use. Drug Use:  reports no history of drug use.     Family History:     Family History   Problem Relation Age of Onset High Blood Pressure Mother     Heart Disease Father 61    Heart Attack Father     High Blood Pressure Sister     High Cholesterol Sister     Cancer Brother         colon    High Blood Pressure Brother     High Cholesterol Brother        Review of Systems:       Review of Systems   Constitutional:  Negative for chills, fatigue, fever, malaise/fatigue and unexpected weight change. HENT:  Negative for facial swelling. Eyes:  Negative for discharge, redness and visual disturbance. Respiratory:  Negative for cough and shortness of breath. Cardiovascular:  Negative for chest pain, palpitations and leg swelling. Gastrointestinal:  Negative for abdominal pain, blood in stool, constipation, diarrhea, nausea and vomiting. Genitourinary:  Negative for dysuria. Musculoskeletal:  Negative for joint swelling and neck pain. Skin:  Negative for rash. Neurological:  Negative for dizziness, tremors, light-headedness and headaches. Psychiatric/Behavioral:  Negative for sleep disturbance. Physical Exam:     Physical Exam  Vitals reviewed. Constitutional:       General: She is not in acute distress. Appearance: Normal appearance. She is well-developed. She is not ill-appearing. HENT:      Head: Normocephalic and atraumatic. Eyes:      General:         Right eye: No discharge. Left eye: No discharge. Conjunctiva/sclera: Conjunctivae normal.   Neck:      Thyroid: No thyromegaly. Vascular: No carotid bruit. Cardiovascular:      Rate and Rhythm: Normal rate and regular rhythm. Heart sounds: Normal heart sounds. No murmur heard. Pulmonary:      Effort: Pulmonary effort is normal. No respiratory distress. Breath sounds: Normal breath sounds. Abdominal:      General: There is no distension. Palpations: Abdomen is soft. Tenderness: There is no abdominal tenderness. Musculoskeletal:      Cervical back: Neck supple. Right lower leg: No edema.       Left lower leg: No edema. Lymphadenopathy:      Cervical: No cervical adenopathy. Skin:     Findings: No erythema or rash. Neurological:      General: No focal deficit present. Mental Status: She is alert. Psychiatric:         Mood and Affect: Mood normal.         Behavior: Behavior normal.       Vitals:  /86   Pulse 80   Ht 5' 1\" (1.549 m)   Wt 181 lb (82.1 kg)   SpO2 96%   BMI 34.20 kg/m²       Data:     Lab Results   Component Value Date/Time     11/03/2022 08:51 AM    K 3.8 11/03/2022 08:51 AM     11/03/2022 08:51 AM    CO2 34 11/03/2022 08:51 AM    BUN 12 11/03/2022 08:51 AM    CREATININE 0.59 11/03/2022 08:51 AM    GLUCOSE 116 11/03/2022 08:51 AM    GLUCOSE 102 03/02/2012 08:42 AM    PROT 6.4 11/03/2022 08:51 AM    LABALBU 3.9 11/03/2022 08:51 AM    LABALBU 4.6 03/02/2012 08:42 AM    BILITOT 2.3 11/03/2022 08:51 AM    ALKPHOS 70 11/03/2022 08:51 AM    AST 21 11/03/2022 08:51 AM    ALT 18 11/03/2022 08:51 AM     Lab Results   Component Value Date/Time    WBC 6.3 11/03/2022 08:51 AM    RBC 4.94 11/03/2022 08:51 AM    HGB 14.7 11/03/2022 08:51 AM    HCT 43.1 11/03/2022 08:51 AM    MCV 87.2 11/03/2022 08:51 AM    MCH 29.8 11/03/2022 08:51 AM    MCHC 34.1 11/03/2022 08:51 AM    RDW 12.8 11/03/2022 08:51 AM     11/03/2022 08:51 AM    MPV NOT REPORTED 02/10/2022 08:38 AM     Lab Results   Component Value Date/Time    TSH 2.33 11/03/2022 08:51 AM     Lab Results   Component Value Date/Time    CHOL 99 11/03/2022 08:51 AM    HDL 41 11/03/2022 08:51 AM    LABA1C 4.8 08/25/2021 08:54 AM          Assessment/Plan:        1. Essential hypertension, benign  Stalbe on HCTZ and toprol -- pt has orders for labs in late OCt for Dr Sherrell Claros    2. Pure hypercholesterolemia  Stable on lipitor     3. Tachycardia  Stable on the Toprol     4. Ovarian failure  Pt to have her DEXA scan     5.  Medicare annual wellness visit, subsequent  complete        Eva received counseling on the following healthy behaviors: nutrition and exercise  Reviewed prior labs and health maintenance  Continue current medications, diet and exercise. Discussed use, benefit, and side effects of prescribed medications. Barriers to medication compliance addressed. Patient given educational materials - see patient instructions  Was a self-tracking handout given in paper form or via [employer]? Yes    Requested Prescriptions      No prescriptions requested or ordered in this encounter       All patient questions answered. Patient voiced understanding. Quality Measures    Body mass index is 34.2 kg/m². Elevated. Weight control planned discussed Healthy diet and regular exercise. BP: 136/86. Blood pressure is normal. Treatment plan consists of No treatment change needed. Fall Risk 3/9/2023 3/3/2022 3/2/2021 9/3/2019 3/6/2018 9/6/2016 9/1/2015   2 or more falls in past year? no no no yes no no no   Fall with injury in past year? no no no no no no no     The patient does not have a history of falls. I did not - not indicated , complete a risk assessment for falls. A plan of care for falls No Treatment plan indicated    Lab Results   Component Value Date    LDLCHOLESTEROL 35 11/03/2022    (goal LDL reduction with dx if diabetes is 50% LDL reduction)    PHQ Scores 3/9/2023 3/3/2022 3/2/2021 3/3/2020 9/3/2019 3/5/2019 3/6/2018   PHQ2 Score 0 0 0 0 0 0 0   PHQ9 Score 0 0 0 0 0 0 0     Interpretation of Total Score Depression Severity: 1-4 = Minimal depression, 5-9 = Mild depression, 10-14 = Moderate depression, 15-19 = Moderately severe depression, 20-27 = Severe depression      Return in 6 months (on 9/9/2023) for Medicare Annual Wellness Visit in 1 year and 6mos for HTN, hyperlipidemia and tachycardia .       Electronically signed by Addison Forbes MD on 3/9/2023 at 10:51 AM

## 2023-05-08 RX ORDER — METOPROLOL SUCCINATE 50 MG/1
TABLET, EXTENDED RELEASE ORAL
Qty: 60 TABLET | Refills: 5 | Status: SHIPPED | OUTPATIENT
Start: 2023-05-08

## 2023-08-01 DIAGNOSIS — I10 ESSENTIAL HYPERTENSION, BENIGN: ICD-10-CM

## 2023-08-01 RX ORDER — HYDROCHLOROTHIAZIDE 25 MG/1
TABLET ORAL
Qty: 30 TABLET | Refills: 5 | Status: SHIPPED | OUTPATIENT
Start: 2023-08-01

## 2023-08-01 NOTE — TELEPHONE ENCOUNTER
Last OV: 3/9/2023 AWV  Last RX:    Next scheduled apt: 9/12/2023   6 months chronic           Surescript requesting a refill

## 2023-09-12 ENCOUNTER — OFFICE VISIT (OUTPATIENT)
Dept: FAMILY MEDICINE CLINIC | Age: 78
End: 2023-09-12
Payer: MEDICARE

## 2023-09-12 VITALS
BODY MASS INDEX: 34.81 KG/M2 | DIASTOLIC BLOOD PRESSURE: 90 MMHG | HEIGHT: 61 IN | SYSTOLIC BLOOD PRESSURE: 146 MMHG | WEIGHT: 184.4 LBS | OXYGEN SATURATION: 93 % | HEART RATE: 63 BPM

## 2023-09-12 DIAGNOSIS — I10 ESSENTIAL HYPERTENSION, BENIGN: Primary | ICD-10-CM

## 2023-09-12 DIAGNOSIS — E78.00 PURE HYPERCHOLESTEROLEMIA: ICD-10-CM

## 2023-09-12 PROCEDURE — 3080F DIAST BP >= 90 MM HG: CPT | Performed by: FAMILY MEDICINE

## 2023-09-12 PROCEDURE — G8427 DOCREV CUR MEDS BY ELIG CLIN: HCPCS | Performed by: FAMILY MEDICINE

## 2023-09-12 PROCEDURE — 1036F TOBACCO NON-USER: CPT | Performed by: FAMILY MEDICINE

## 2023-09-12 PROCEDURE — G8400 PT W/DXA NO RESULTS DOC: HCPCS | Performed by: FAMILY MEDICINE

## 2023-09-12 PROCEDURE — 99213 OFFICE O/P EST LOW 20 MIN: CPT | Performed by: FAMILY MEDICINE

## 2023-09-12 PROCEDURE — 1123F ACP DISCUSS/DSCN MKR DOCD: CPT | Performed by: FAMILY MEDICINE

## 2023-09-12 PROCEDURE — 3077F SYST BP >= 140 MM HG: CPT | Performed by: FAMILY MEDICINE

## 2023-09-12 PROCEDURE — 1090F PRES/ABSN URINE INCON ASSESS: CPT | Performed by: FAMILY MEDICINE

## 2023-09-12 PROCEDURE — G8417 CALC BMI ABV UP PARAM F/U: HCPCS | Performed by: FAMILY MEDICINE

## 2023-09-12 RX ORDER — ATORVASTATIN CALCIUM 80 MG/1
80 TABLET, FILM COATED ORAL DAILY
COMMUNITY

## 2023-09-12 ASSESSMENT — ENCOUNTER SYMPTOMS
NAUSEA: 0
CONSTIPATION: 0
EYE REDNESS: 0
ABDOMINAL PAIN: 0
COUGH: 0
VOMITING: 0
DIARRHEA: 0
BLOOD IN STOOL: 0
EYE DISCHARGE: 0
SHORTNESS OF BREATH: 0

## 2023-09-12 NOTE — PROGRESS NOTES
planned discussed Healthy diet and regular exercise. BP: (!) 146/90. Blood pressure is normal. Treatment plan consists of Patient In-home Blood Pressure Monitoring and No treatment change needed. 3/9/2023    10:05 AM 3/3/2022    10:50 AM 3/2/2021     9:46 AM 9/3/2019     9:55 AM 3/6/2018     9:02 AM 9/6/2016    10:54 AM 9/1/2015     9:50 AM   Fall Risk   2 or more falls in past year? no no no yes no no no   Fall with injury in past year? no no no no no no no     The patient does not have a history of falls. I did not - not indicated , complete a risk assessment for falls. A plan of care for falls No Treatment plan indicated    Lab Results   Component Value Date    LDLCHOLESTEROL 35 11/03/2022    (goal LDL reduction with dx if diabetes is 50% LDL reduction)        3/9/2023    10:05 AM 3/3/2022    10:50 AM 3/2/2021     9:47 AM 3/3/2020    10:45 AM 9/3/2019     9:55 AM 3/5/2019     9:39 AM 3/6/2018     9:02 AM   PHQ Scores   PHQ2 Score 0 0 0 0 0 0 0   PHQ9 Score 0 0 0 0 0 0 0     Interpretation of Total Score Depression Severity: 1-4 = Minimal depression, 5-9 = Mild depression, 10-14 = Moderate depression, 15-19 = Moderately severe depression, 20-27 = Severe depression      Return in about 6 months (around 3/12/2024) for HTN, Hyperlipidemia and medicare wellness.       Electronically signed by Shelly Mayo MD on 9/12/2023 at 11:07 AM

## 2023-10-31 ENCOUNTER — HOSPITAL ENCOUNTER (OUTPATIENT)
Age: 78
Discharge: HOME | End: 2023-10-31
Payer: MEDICARE

## 2023-10-31 ENCOUNTER — HOSPITAL ENCOUNTER (OUTPATIENT)
Dept: GENERAL RADIOLOGY | Age: 78
Discharge: HOME | End: 2023-11-02
Payer: MEDICARE

## 2023-10-31 ENCOUNTER — HOSPITAL ENCOUNTER (OUTPATIENT)
Age: 78
Discharge: HOME | End: 2023-11-02
Payer: MEDICARE

## 2023-10-31 DIAGNOSIS — I10 ESSENTIAL HYPERTENSION, BENIGN: ICD-10-CM

## 2023-10-31 DIAGNOSIS — I25.42 SPONTANEOUS DISSECTION OF CORONARY ARTERY: ICD-10-CM

## 2023-10-31 DIAGNOSIS — E78.5 HYPERLIPIDEMIA, UNSPECIFIED HYPERLIPIDEMIA TYPE: ICD-10-CM

## 2023-10-31 DIAGNOSIS — I25.10 CORONARY ARTERY DISEASE INVOLVING NATIVE HEART WITHOUT ANGINA PECTORIS, UNSPECIFIED VESSEL OR LESION TYPE: ICD-10-CM

## 2023-10-31 LAB
ALBUMIN SERPL-MCNC: 3.9 G/DL (ref 3.5–5.2)
ALP SERPL-CCNC: 65 U/L (ref 35–104)
ALT SERPL-CCNC: 21 U/L (ref 5–33)
ANION GAP SERPL CALCULATED.3IONS-SCNC: 6 MMOL/L (ref 9–17)
AST SERPL-CCNC: 29 U/L
BASOPHILS # BLD: 0.03 K/UL (ref 0–0.2)
BASOPHILS NFR BLD: 1 % (ref 0–2)
BILIRUB SERPL-MCNC: 2.8 MG/DL (ref 0.3–1.2)
BUN SERPL-MCNC: 12 MG/DL (ref 8–23)
BUN/CREAT SERPL: 20 (ref 9–20)
CALCIUM SERPL-MCNC: 9.6 MG/DL (ref 8.6–10.4)
CHLORIDE SERPL-SCNC: 101 MMOL/L (ref 98–107)
CHOLEST SERPL-MCNC: 90 MG/DL
CHOLESTEROL/HDL RATIO: 2.1
CO2 SERPL-SCNC: 33 MMOL/L (ref 20–31)
CREAT SERPL-MCNC: 0.6 MG/DL (ref 0.5–0.9)
EOSINOPHIL # BLD: 0.27 K/UL (ref 0–0.4)
EOSINOPHILS RELATIVE PERCENT: 5 % (ref 0–5)
ERYTHROCYTE [DISTWIDTH] IN BLOOD BY AUTOMATED COUNT: 12.4 % (ref 12.1–15.2)
GFR SERPL CREATININE-BSD FRML MDRD: >60 ML/MIN/1.73M2
GLUCOSE SERPL-MCNC: 117 MG/DL (ref 70–99)
HCT VFR BLD AUTO: 45.1 % (ref 36–46)
HDLC SERPL-MCNC: 42 MG/DL
HGB BLD-MCNC: 15.1 G/DL (ref 12–16)
IMM GRANULOCYTES # BLD AUTO: 0 K/UL (ref 0–0.3)
IMM GRANULOCYTES NFR BLD: 0 % (ref 0–5)
LDLC SERPL CALC-MCNC: 31 MG/DL (ref 0–130)
LYMPHOCYTES NFR BLD: 1.71 K/UL (ref 1–4.8)
LYMPHOCYTES RELATIVE PERCENT: 32 % (ref 15–40)
MAGNESIUM SERPL-MCNC: 2.2 MG/DL (ref 1.6–2.6)
MCH RBC QN AUTO: 29.2 PG (ref 26–34)
MCHC RBC AUTO-ENTMCNC: 33.5 G/DL (ref 31–37)
MCV RBC AUTO: 87.2 FL (ref 80–100)
MONOCYTES NFR BLD: 0.59 K/UL (ref 0–1)
MONOCYTES NFR BLD: 11 % (ref 4–8)
NEUTROPHILS NFR BLD: 51 % (ref 47–75)
NEUTS SEG NFR BLD: 2.74 K/UL (ref 2.5–7)
PATIENT FASTING?: YES
PLATELET # BLD AUTO: 207 K/UL (ref 140–450)
PMV BLD AUTO: 10 FL (ref 6–12)
POTASSIUM SERPL-SCNC: 3.8 MMOL/L (ref 3.7–5.3)
PROT SERPL-MCNC: 6.8 G/DL (ref 6.4–8.3)
RBC # BLD AUTO: 5.17 M/UL (ref 4–5.2)
SODIUM SERPL-SCNC: 140 MMOL/L (ref 135–144)
TRIGL SERPL-MCNC: 86 MG/DL
TSH SERPL DL<=0.05 MIU/L-ACNC: 1.86 UIU/ML (ref 0.3–5)
WBC OTHER # BLD: 5.3 K/UL (ref 3.5–11)

## 2023-10-31 PROCEDURE — 93005 ELECTROCARDIOGRAM TRACING: CPT

## 2023-10-31 PROCEDURE — 84443 ASSAY THYROID STIM HORMONE: CPT

## 2023-10-31 PROCEDURE — 83735 ASSAY OF MAGNESIUM: CPT

## 2023-10-31 PROCEDURE — 80061 LIPID PANEL: CPT

## 2023-10-31 PROCEDURE — 85025 COMPLETE CBC W/AUTO DIFF WBC: CPT

## 2023-10-31 PROCEDURE — 71046 X-RAY EXAM CHEST 2 VIEWS: CPT

## 2023-10-31 PROCEDURE — 80053 COMPREHEN METABOLIC PANEL: CPT

## 2023-10-31 PROCEDURE — 36415 COLL VENOUS BLD VENIPUNCTURE: CPT

## 2023-11-01 LAB
EKG ATRIAL RATE: 67 BPM
EKG P AXIS: 62 DEGREES
EKG P-R INTERVAL: 198 MS
EKG Q-T INTERVAL: 382 MS
EKG QRS DURATION: 84 MS
EKG QTC CALCULATION (BAZETT): 403 MS
EKG R AXIS: 52 DEGREES
EKG T AXIS: 57 DEGREES
EKG VENTRICULAR RATE: 67 BPM

## 2023-11-01 RX ORDER — ATORVASTATIN CALCIUM 80 MG/1
80 TABLET, FILM COATED ORAL DAILY
Qty: 30 TABLET | Refills: 11 | Status: SHIPPED | OUTPATIENT
Start: 2023-11-01

## 2023-11-01 RX ORDER — METOPROLOL SUCCINATE 50 MG/1
TABLET, EXTENDED RELEASE ORAL
Qty: 60 TABLET | Refills: 5 | Status: SHIPPED | OUTPATIENT
Start: 2023-11-01

## 2023-11-01 NOTE — TELEPHONE ENCOUNTER
Last OV: 9/12/2023  chronic   Last RX:    Next scheduled apt: 3/12/2024   6 months chronic           Surescript requesting a refill

## 2023-11-07 ENCOUNTER — OFFICE VISIT (OUTPATIENT)
Dept: CARDIOLOGY CLINIC | Age: 78
End: 2023-11-07

## 2023-11-07 VITALS
BODY MASS INDEX: 35.33 KG/M2 | DIASTOLIC BLOOD PRESSURE: 90 MMHG | WEIGHT: 187 LBS | OXYGEN SATURATION: 93 % | SYSTOLIC BLOOD PRESSURE: 190 MMHG | HEART RATE: 76 BPM

## 2023-11-07 DIAGNOSIS — E55.9 VITAMIN D DEFICIENCY DISEASE: ICD-10-CM

## 2023-11-07 DIAGNOSIS — I10 ESSENTIAL HYPERTENSION, BENIGN: Primary | ICD-10-CM

## 2023-11-07 DIAGNOSIS — I25.10 CORONARY ARTERY DISEASE INVOLVING NATIVE HEART WITHOUT ANGINA PECTORIS, UNSPECIFIED VESSEL OR LESION TYPE: ICD-10-CM

## 2023-11-07 DIAGNOSIS — E78.5 HYPERLIPIDEMIA, UNSPECIFIED HYPERLIPIDEMIA TYPE: ICD-10-CM

## 2023-11-07 RX ORDER — ATORVASTATIN CALCIUM 80 MG/1
80 TABLET, FILM COATED ORAL DAILY
Qty: 30 TABLET | Refills: 11 | Status: SHIPPED | OUTPATIENT
Start: 2023-11-07

## 2023-11-07 NOTE — PROGRESS NOTES
Ov DR Valorie Vivar 1 year follow up  Ronaldo Sullivan couple months ago  C/o back pain. No chest pain or sob  No edema  No hospitalizations or   Procedures since seen. Bp at home in normal range    See in 1 year.

## 2024-01-30 DIAGNOSIS — I10 ESSENTIAL HYPERTENSION, BENIGN: ICD-10-CM

## 2024-01-30 RX ORDER — HYDROCHLOROTHIAZIDE 25 MG/1
TABLET ORAL
Qty: 30 TABLET | Refills: 5 | Status: SHIPPED | OUTPATIENT
Start: 2024-01-30

## 2024-03-12 ENCOUNTER — OFFICE VISIT (OUTPATIENT)
Dept: FAMILY MEDICINE CLINIC | Age: 79
End: 2024-03-12
Payer: MEDICARE

## 2024-03-12 VITALS
WEIGHT: 190 LBS | HEART RATE: 72 BPM | OXYGEN SATURATION: 94 % | BODY MASS INDEX: 35.9 KG/M2 | DIASTOLIC BLOOD PRESSURE: 92 MMHG | SYSTOLIC BLOOD PRESSURE: 150 MMHG

## 2024-03-12 DIAGNOSIS — E78.00 PURE HYPERCHOLESTEROLEMIA: ICD-10-CM

## 2024-03-12 DIAGNOSIS — Z00.00 MEDICARE ANNUAL WELLNESS VISIT, SUBSEQUENT: Primary | ICD-10-CM

## 2024-03-12 DIAGNOSIS — I10 ESSENTIAL HYPERTENSION, BENIGN: ICD-10-CM

## 2024-03-12 PROCEDURE — 3080F DIAST BP >= 90 MM HG: CPT | Performed by: FAMILY MEDICINE

## 2024-03-12 PROCEDURE — G8484 FLU IMMUNIZE NO ADMIN: HCPCS | Performed by: FAMILY MEDICINE

## 2024-03-12 PROCEDURE — G0439 PPPS, SUBSEQ VISIT: HCPCS | Performed by: FAMILY MEDICINE

## 2024-03-12 PROCEDURE — 1123F ACP DISCUSS/DSCN MKR DOCD: CPT | Performed by: FAMILY MEDICINE

## 2024-03-12 PROCEDURE — 3077F SYST BP >= 140 MM HG: CPT | Performed by: FAMILY MEDICINE

## 2024-03-12 ASSESSMENT — ENCOUNTER SYMPTOMS
FACIAL SWELLING: 0
NAUSEA: 0
ABDOMINAL PAIN: 0
DIARRHEA: 0
CONSTIPATION: 0
EYE REDNESS: 0
SHORTNESS OF BREATH: 0
VOMITING: 0
COUGH: 0
BLOOD IN STOOL: 0
EYE DISCHARGE: 0

## 2024-03-12 ASSESSMENT — PATIENT HEALTH QUESTIONNAIRE - PHQ9
SUM OF ALL RESPONSES TO PHQ QUESTIONS 1-9: 0
1. LITTLE INTEREST OR PLEASURE IN DOING THINGS: 0
SUM OF ALL RESPONSES TO PHQ QUESTIONS 1-9: 0
SUM OF ALL RESPONSES TO PHQ9 QUESTIONS 1 & 2: 0
SUM OF ALL RESPONSES TO PHQ QUESTIONS 1-9: 0
2. FEELING DOWN, DEPRESSED OR HOPELESS: 0
SUM OF ALL RESPONSES TO PHQ QUESTIONS 1-9: 0

## 2024-03-12 ASSESSMENT — LIFESTYLE VARIABLES
HOW OFTEN DO YOU HAVE A DRINK CONTAINING ALCOHOL: NEVER
HOW MANY STANDARD DRINKS CONTAINING ALCOHOL DO YOU HAVE ON A TYPICAL DAY: PATIENT DOES NOT DRINK

## 2024-03-12 NOTE — PROGRESS NOTES
Medicare Annual Wellness Visit    Eva Alexandra is here for Medicare AWV (Patient here today for AWV), Hypertension, and Hyperlipidemia    Assessment & Plan   Medicare annual wellness visit, subsequent  Essential hypertension, benign  Pure hypercholesterolemia    Recommendations for Preventive Services Due: see orders and patient instructions/AVS.  Recommended screening schedule for the next 5-10 years is provided to the patient in written form: see Patient Instructions/AVS.     Return in about 6 months (around 9/12/2024) for HTN, Hyperlipidemia.     Subjective   The following acute and/or chronic problems were also addressed today: see 6mos     Patient's complete Health Risk Assessment and screening values have been reviewed and are found in Flowsheets. The following problems were reviewed today and where indicated follow up appointments were made and/or referrals ordered.    Positive Risk Factor Screenings with Interventions:                Activity, Diet, and Weight:  On average, how many days per week do you engage in moderate to strenuous exercise (like a brisk walk)?: 2 days  On average, how many minutes do you engage in exercise at this level?: 10 min    Do you eat balanced/healthy meals regularly?: Yes    Body mass index is 35.9 kg/m². (!) Abnormal    Obesity Interventions:  Patient declines any further evaluation or treatment        Vision Screen:  Do you have difficulty driving, watching TV, or doing any of your daily activities because of your eyesight?: No  Have you had an eye exam within the past year?: (!) No  No results found.    Interventions:   Patient encouraged to make appointment with their eye specialist                  Objective   Vitals:    03/12/24 1037 03/12/24 1047   BP: (!) 154/90 (!) 150/92   Site: Right Upper Arm Left Upper Arm   Pulse: 72    SpO2: 94%    Weight: 86.2 kg (190 lb)       Body mass index is 35.9 kg/m².      PE- see 6mos        No Known Allergies  Prior to Visit

## 2024-03-12 NOTE — PROGRESS NOTES
HPI Notes    Name: Eva Alexandra  : 1945        Chief Complaint:     Chief Complaint   Patient presents with    Medicare AWV     Patient here today for AWV    Hypertension    Hyperlipidemia       History of Present Illness:     Eva Alexandra is a 79 y.o.  female who presents with Medicare AWV (Patient here today for AWV), Hypertension, and Hyperlipidemia      Hypertension  This is a chronic problem. The current episode started more than 1 year ago. The problem is unchanged. The problem is controlled (pt does check her BP at home and runs 120s/70s). Pertinent negatives include no chest pain, headaches, neck pain, palpitations, peripheral edema or shortness of breath. There are no associated agents to hypertension. Risk factors for coronary artery disease include dyslipidemia and post-menopausal state. The current treatment provides significant improvement.   Hyperlipidemia  This is a chronic problem. The current episode started more than 1 year ago. The problem is controlled. Recent lipid tests were reviewed and are normal. She has no history of diabetes or hypothyroidism. There are no known factors aggravating her hyperlipidemia. Pertinent negatives include no chest pain or shortness of breath. Current antihyperlipidemic treatment includes statins. The current treatment provides significant improvement of lipids. Risk factors for coronary artery disease include hypertension and dyslipidemia.     Past Medical History:     Past Medical History:   Diagnosis Date    Hyperlipidemia     Hypertension     Kidney stone       Reviewed all health maintenance requirements and ordered appropriate tests  Health Maintenance Due   Topic Date Due    COVID-19 Vaccine (1) Never done    DTaP/Tdap/Td vaccine (1 - Tdap) Never done    Shingles vaccine (1 of 2) Never done    DEXA (modify frequency per FRAX score)  Never done    Respiratory Syncytial Virus (RSV) Pregnant or age 60 yrs+ (1 - 1-dose 60+ series) Never done

## 2024-05-01 RX ORDER — METOPROLOL SUCCINATE 50 MG/1
TABLET, EXTENDED RELEASE ORAL
Qty: 60 TABLET | Refills: 5 | Status: SHIPPED | OUTPATIENT
Start: 2024-05-01

## 2024-05-01 NOTE — TELEPHONE ENCOUNTER
Last OV: 3/12/2024 AWV, HTN, HLD   Last RX:    Next scheduled apt: 9/17/2024   6 MONTHS HTN,HLD                Surescript requesting a refill

## 2024-08-11 DIAGNOSIS — I10 ESSENTIAL HYPERTENSION, BENIGN: ICD-10-CM

## 2024-08-12 RX ORDER — HYDROCHLOROTHIAZIDE 25 MG/1
TABLET ORAL
Qty: 30 TABLET | Refills: 5 | Status: SHIPPED | OUTPATIENT
Start: 2024-08-12

## 2024-08-12 NOTE — TELEPHONE ENCOUNTER
Last OV 3/12/24     Next OV 9/17/24    Requesting refill on hydrochlorothiazide thru [facility].  Rx pending

## 2024-09-17 ENCOUNTER — OFFICE VISIT (OUTPATIENT)
Dept: FAMILY MEDICINE CLINIC | Age: 79
End: 2024-09-17

## 2024-09-17 VITALS
DIASTOLIC BLOOD PRESSURE: 100 MMHG | WEIGHT: 190 LBS | OXYGEN SATURATION: 95 % | HEIGHT: 61 IN | HEART RATE: 70 BPM | BODY MASS INDEX: 35.87 KG/M2 | SYSTOLIC BLOOD PRESSURE: 148 MMHG

## 2024-09-17 DIAGNOSIS — E78.00 PURE HYPERCHOLESTEROLEMIA: ICD-10-CM

## 2024-09-17 DIAGNOSIS — I10 ESSENTIAL HYPERTENSION, BENIGN: Primary | ICD-10-CM

## 2024-09-17 DIAGNOSIS — S16.1XXA CERVICAL MYOFASCIAL STRAIN, INITIAL ENCOUNTER: ICD-10-CM

## 2024-09-17 SDOH — ECONOMIC STABILITY: INCOME INSECURITY: HOW HARD IS IT FOR YOU TO PAY FOR THE VERY BASICS LIKE FOOD, HOUSING, MEDICAL CARE, AND HEATING?: NOT HARD AT ALL

## 2024-09-17 SDOH — ECONOMIC STABILITY: FOOD INSECURITY: WITHIN THE PAST 12 MONTHS, THE FOOD YOU BOUGHT JUST DIDN'T LAST AND YOU DIDN'T HAVE MONEY TO GET MORE.: NEVER TRUE

## 2024-09-17 SDOH — ECONOMIC STABILITY: FOOD INSECURITY: WITHIN THE PAST 12 MONTHS, YOU WORRIED THAT YOUR FOOD WOULD RUN OUT BEFORE YOU GOT MONEY TO BUY MORE.: NEVER TRUE

## 2024-09-17 ASSESSMENT — ENCOUNTER SYMPTOMS
NAUSEA: 0
ABDOMINAL PAIN: 0
BLURRED VISION: 0
SHORTNESS OF BREATH: 0
VOMITING: 0
EYE REDNESS: 0
COUGH: 0
EYE DISCHARGE: 0
DIARRHEA: 0
BLOOD IN STOOL: 0

## 2024-10-24 ENCOUNTER — HOSPITAL ENCOUNTER (OUTPATIENT)
Age: 79
Discharge: HOME | End: 2024-10-26
Payer: MEDICARE

## 2024-10-24 ENCOUNTER — HOSPITAL ENCOUNTER (OUTPATIENT)
Age: 79
Discharge: HOME | End: 2024-10-24
Payer: MEDICARE

## 2024-10-24 ENCOUNTER — HOSPITAL ENCOUNTER (OUTPATIENT)
Dept: GENERAL RADIOLOGY | Age: 79
Discharge: HOME | End: 2024-10-26
Payer: MEDICARE

## 2024-10-24 DIAGNOSIS — I25.10 CORONARY ARTERY DISEASE INVOLVING NATIVE HEART WITHOUT ANGINA PECTORIS, UNSPECIFIED VESSEL OR LESION TYPE: ICD-10-CM

## 2024-10-24 DIAGNOSIS — E55.9 VITAMIN D DEFICIENCY DISEASE: ICD-10-CM

## 2024-10-24 DIAGNOSIS — I10 ESSENTIAL HYPERTENSION, BENIGN: ICD-10-CM

## 2024-10-24 DIAGNOSIS — E78.5 HYPERLIPIDEMIA, UNSPECIFIED HYPERLIPIDEMIA TYPE: ICD-10-CM

## 2024-10-24 LAB
25(OH)D3 SERPL-MCNC: 32.9 NG/ML (ref 30–100)
ALBUMIN SERPL-MCNC: 3.9 G/DL (ref 3.5–5.2)
ALP SERPL-CCNC: 62 U/L (ref 35–104)
ALT SERPL-CCNC: 22 U/L (ref 5–33)
ANION GAP SERPL CALCULATED.3IONS-SCNC: 7 MMOL/L (ref 9–17)
AST SERPL-CCNC: 25 U/L
BASOPHILS # BLD: 0.02 K/UL (ref 0–0.2)
BASOPHILS NFR BLD: 0 % (ref 0–2)
BILIRUB SERPL-MCNC: 3 MG/DL (ref 0.3–1.2)
BUN SERPL-MCNC: 13 MG/DL (ref 8–23)
BUN/CREAT SERPL: 22 (ref 9–20)
CALCIUM SERPL-MCNC: 9.4 MG/DL (ref 8.6–10.4)
CHLORIDE SERPL-SCNC: 101 MMOL/L (ref 98–107)
CHOLEST SERPL-MCNC: 95 MG/DL (ref 0–199)
CHOLESTEROL/HDL RATIO: 2
CO2 SERPL-SCNC: 34 MMOL/L (ref 20–31)
CREAT SERPL-MCNC: 0.6 MG/DL (ref 0.5–0.9)
EKG ATRIAL RATE: 77 BPM
EKG P AXIS: 62 DEGREES
EKG P-R INTERVAL: 198 MS
EKG Q-T INTERVAL: 368 MS
EKG QRS DURATION: 74 MS
EKG QTC CALCULATION (BAZETT): 416 MS
EKG R AXIS: 59 DEGREES
EKG T AXIS: 63 DEGREES
EKG VENTRICULAR RATE: 77 BPM
EOSINOPHIL # BLD: 0.29 K/UL (ref 0–0.4)
EOSINOPHILS RELATIVE PERCENT: 5 % (ref 0–5)
ERYTHROCYTE [DISTWIDTH] IN BLOOD BY AUTOMATED COUNT: 12.1 % (ref 12.1–15.2)
GFR, ESTIMATED: >90 ML/MIN/1.73M2
GLUCOSE SERPL-MCNC: 120 MG/DL (ref 70–99)
HCT VFR BLD AUTO: 44.6 % (ref 36–46)
HDLC SERPL-MCNC: 39 MG/DL
HGB BLD-MCNC: 14.9 G/DL (ref 12–16)
IMM GRANULOCYTES # BLD AUTO: 0.01 K/UL (ref 0–0.3)
IMM GRANULOCYTES NFR BLD: 0 % (ref 0–5)
LDLC SERPL CALC-MCNC: 33 MG/DL (ref 0–100)
LYMPHOCYTES NFR BLD: 1.62 K/UL (ref 1–4.8)
LYMPHOCYTES RELATIVE PERCENT: 30 % (ref 15–40)
MAGNESIUM SERPL-MCNC: 2.1 MG/DL (ref 1.6–2.6)
MCH RBC QN AUTO: 30.2 PG (ref 26–34)
MCHC RBC AUTO-ENTMCNC: 33.4 G/DL (ref 31–37)
MCV RBC AUTO: 90.3 FL (ref 80–100)
MONOCYTES NFR BLD: 0.59 K/UL (ref 0–1)
MONOCYTES NFR BLD: 11 % (ref 4–8)
NEUTROPHILS NFR BLD: 54 % (ref 47–75)
NEUTS SEG NFR BLD: 2.88 K/UL (ref 2.5–7)
PLATELET # BLD AUTO: 195 K/UL (ref 140–450)
PMV BLD AUTO: 10.1 FL (ref 6–12)
POTASSIUM SERPL-SCNC: 4 MMOL/L (ref 3.7–5.3)
PROT SERPL-MCNC: 6.8 G/DL (ref 6.4–8.3)
RBC # BLD AUTO: 4.94 M/UL (ref 4–5.2)
SODIUM SERPL-SCNC: 142 MMOL/L (ref 135–144)
TRIGL SERPL-MCNC: 114 MG/DL
TSH SERPL DL<=0.05 MIU/L-ACNC: 1.75 UIU/ML (ref 0.3–5)
VLDLC SERPL CALC-MCNC: 23 MG/DL
WBC OTHER # BLD: 5.4 K/UL (ref 3.5–11)

## 2024-10-24 PROCEDURE — 85025 COMPLETE CBC W/AUTO DIFF WBC: CPT

## 2024-10-24 PROCEDURE — 36415 COLL VENOUS BLD VENIPUNCTURE: CPT

## 2024-10-24 PROCEDURE — 82306 VITAMIN D 25 HYDROXY: CPT

## 2024-10-24 PROCEDURE — 84443 ASSAY THYROID STIM HORMONE: CPT

## 2024-10-24 PROCEDURE — 71046 X-RAY EXAM CHEST 2 VIEWS: CPT

## 2024-10-24 PROCEDURE — 80061 LIPID PANEL: CPT

## 2024-10-24 PROCEDURE — 83735 ASSAY OF MAGNESIUM: CPT

## 2024-10-24 PROCEDURE — 80053 COMPREHEN METABOLIC PANEL: CPT

## 2024-10-28 LAB
EKG ATRIAL RATE: 77 BPM
EKG P AXIS: 62 DEGREES
EKG P-R INTERVAL: 198 MS
EKG Q-T INTERVAL: 368 MS
EKG QRS DURATION: 74 MS
EKG QTC CALCULATION (BAZETT): 416 MS
EKG R AXIS: 59 DEGREES
EKG T AXIS: 63 DEGREES
EKG VENTRICULAR RATE: 77 BPM

## 2024-11-05 ENCOUNTER — OFFICE VISIT (OUTPATIENT)
Dept: CARDIOLOGY CLINIC | Age: 79
End: 2024-11-05

## 2024-11-05 VITALS
OXYGEN SATURATION: 90 % | HEART RATE: 74 BPM | SYSTOLIC BLOOD PRESSURE: 170 MMHG | DIASTOLIC BLOOD PRESSURE: 90 MMHG | WEIGHT: 191 LBS | BODY MASS INDEX: 36.09 KG/M2

## 2024-11-05 DIAGNOSIS — E78.5 HYPERLIPIDEMIA, UNSPECIFIED HYPERLIPIDEMIA TYPE: ICD-10-CM

## 2024-11-05 DIAGNOSIS — I25.10 CORONARY ARTERY DISEASE INVOLVING NATIVE HEART WITHOUT ANGINA PECTORIS, UNSPECIFIED VESSEL OR LESION TYPE: ICD-10-CM

## 2024-11-05 DIAGNOSIS — I10 ESSENTIAL HYPERTENSION, BENIGN: Primary | ICD-10-CM

## 2024-11-05 DIAGNOSIS — E55.9 VITAMIN D DEFICIENCY DISEASE: ICD-10-CM

## 2024-11-05 NOTE — PROGRESS NOTES
Ov DR Mccallum 1 year follow up   No chest pain or sob  No dizziness   No hospitalizations   Or procedures since  Seen.    Bedside echo done       See in 1 year.

## 2024-11-11 RX ORDER — METOPROLOL SUCCINATE 50 MG/1
TABLET, EXTENDED RELEASE ORAL
Qty: 60 TABLET | Refills: 5 | Status: SHIPPED | OUTPATIENT
Start: 2024-11-11

## 2024-11-11 RX ORDER — ATORVASTATIN CALCIUM 80 MG/1
80 TABLET, FILM COATED ORAL DAILY
Qty: 30 TABLET | Refills: 11 | Status: SHIPPED | OUTPATIENT
Start: 2024-11-11

## 2024-11-12 NOTE — PROGRESS NOTES
Will Mccallum M.D.  [facility]  [facility]  [address] [id number] [phone]        2024        Freda Leal MD  [address] [facility] [address]     RE:  JO JOAQUIN  :  1945    Dear Dr. Leal:    CHIEF COMPLAINT:    Very labile hypertension.  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  Functional class 1.    HISTORY OF PRESENT ILLNESS:  I had the pleasure seeing Mrs. Joaquin in our office on 2024.  She is a very pleasant 79-year-old female who looks younger than her stated age.  She presented to the emergency room with chest pain on 2021.  Her enzymes are positive and she was transferred to [facility] where she had a catheterization by Dr. Beverly that showed spontaneous dissection of the first OM.  The LAD and right coronary artery are normal, EF 55% to 60%.  She went through cardiac rehab and has had no further chest pain or chest discomfort.  Her EF has remained in normal range.    She does have a history of SVT which is well controlled.   When I see her, she has done well over this past year.  She has had no chest pain or shortness of breath.  No dizziness, hospitalizations or procedures since last seen (she was especially grateful that Land[place] won the election....).    CARDIAC RISK FACTORS:  Hypertension:  Positive.  Hyperlipidemia:  Positive.  Peripheral Vascular Disease:  Negative.  Smoking:  Negative.  Diabetes:  Negative.  Known CAD: Negative.  Prior MI: Positive (SCAD).    MEDICATIONS:  At this time, she is on; aspirin 81 mg daily, Lipitor 80 mg daily, HydroDIURIL 25 mg daily, Toprol-XL 50 mg b.i.d., zinc 50 mg daily.    PAST MEDICAL AND SURGICAL HISTORY:    Hyperlipidemia, well controlled.  Hypertension, very labile.  Kidney stone.  Tubal

## 2025-02-06 DIAGNOSIS — I10 ESSENTIAL HYPERTENSION, BENIGN: ICD-10-CM

## 2025-02-06 RX ORDER — HYDROCHLOROTHIAZIDE 25 MG/1
TABLET ORAL
Qty: 30 TABLET | Refills: 5 | Status: SHIPPED | OUTPATIENT
Start: 2025-02-06

## 2025-03-18 ENCOUNTER — OFFICE VISIT (OUTPATIENT)
Dept: FAMILY MEDICINE CLINIC | Age: 80
End: 2025-03-18

## 2025-03-18 VITALS
WEIGHT: 187 LBS | BODY MASS INDEX: 35.3 KG/M2 | HEIGHT: 61 IN | OXYGEN SATURATION: 95 % | DIASTOLIC BLOOD PRESSURE: 88 MMHG | HEART RATE: 72 BPM | SYSTOLIC BLOOD PRESSURE: 138 MMHG

## 2025-03-18 DIAGNOSIS — E78.00 PURE HYPERCHOLESTEROLEMIA: ICD-10-CM

## 2025-03-18 DIAGNOSIS — Z00.00 MEDICARE ANNUAL WELLNESS VISIT, SUBSEQUENT: Primary | ICD-10-CM

## 2025-03-18 DIAGNOSIS — I10 ESSENTIAL HYPERTENSION, BENIGN: ICD-10-CM

## 2025-03-18 SDOH — ECONOMIC STABILITY: FOOD INSECURITY: WITHIN THE PAST 12 MONTHS, THE FOOD YOU BOUGHT JUST DIDN'T LAST AND YOU DIDN'T HAVE MONEY TO GET MORE.: NEVER TRUE

## 2025-03-18 SDOH — ECONOMIC STABILITY: FOOD INSECURITY: WITHIN THE PAST 12 MONTHS, YOU WORRIED THAT YOUR FOOD WOULD RUN OUT BEFORE YOU GOT MONEY TO BUY MORE.: NEVER TRUE

## 2025-03-18 ASSESSMENT — ENCOUNTER SYMPTOMS
SHORTNESS OF BREATH: 0
EYE REDNESS: 0
EYE DISCHARGE: 0
DIARRHEA: 0
VOMITING: 0
BLOOD IN STOOL: 0
NAUSEA: 0
COUGH: 0
ABDOMINAL PAIN: 0

## 2025-03-18 ASSESSMENT — PATIENT HEALTH QUESTIONNAIRE - PHQ9
1. LITTLE INTEREST OR PLEASURE IN DOING THINGS: NOT AT ALL
SUM OF ALL RESPONSES TO PHQ QUESTIONS 1-9: 0
2. FEELING DOWN, DEPRESSED OR HOPELESS: NOT AT ALL
SUM OF ALL RESPONSES TO PHQ QUESTIONS 1-9: 0

## 2025-03-18 NOTE — PROGRESS NOTES
HPI Notes    Name: Eva Alexandra  : 1945        Chief Complaint:     Chief Complaint   Patient presents with    Medicare AWV     Patient here today for AWV    Hypertension    Hyperlipidemia       History of Present Illness:     Eva Alexandra is a 80 y.o.  female who presents with Medicare AWV (Patient here today for AWV), Hypertension, and Hyperlipidemia      Hypertension  This is a chronic problem. The current episode started more than 1 year ago. The problem is unchanged. The problem is controlled. Pertinent negatives include no chest pain, headaches, malaise/fatigue, palpitations, peripheral edema or shortness of breath. There are no associated agents to hypertension. Risk factors for coronary artery disease include dyslipidemia and post-menopausal state. The current treatment provides significant improvement.   Hyperlipidemia  This is a chronic problem. The current episode started more than 1 year ago. The problem is controlled. Recent lipid tests were reviewed and are normal. She has no history of diabetes. Pertinent negatives include no chest pain or shortness of breath. Current antihyperlipidemic treatment includes statins. The current treatment provides significant improvement of lipids. Risk factors for coronary artery disease include dyslipidemia and hypertension.       Past Medical History:     Past Medical History:   Diagnosis Date    Hyperlipidemia     Hypertension     Kidney stone       Reviewed all health maintenance requirements and ordered appropriate tests  Health Maintenance Due   Topic Date Due    DTaP/Tdap/Td vaccine (1 - Tdap) Never done    Shingles vaccine (1 of 2) Never done    DEXA (modify frequency per FRAX score)  Never done    Respiratory Syncytial Virus (RSV) Pregnant or age 60 yrs+ (1 - 1-dose 75+ series) Never done    Flu vaccine (1) 2024    COVID-19 Vaccine ( - -25 season) Never done       Past Surgical History:     Past Surgical History:   Procedure

## 2025-03-18 NOTE — PROGRESS NOTES
Medicare Annual Wellness Visit    Eva Alexandra is here for Medicare AWV (Patient here today for AWV), Hypertension, and Hyperlipidemia    Assessment & Plan   Medicare annual wellness visit, subsequent  Essential hypertension, benign  Pure hypercholesterolemia       Return in about 6 months (around 9/18/2025) for HTN, Hyperlipidemia.     Subjective   The following acute and/or chronic problems were also addressed today:  1.) Medicare wellness  2.) HTN  3.) hyperlipidemia     Patient's complete Health Risk Assessment and screening values have been reviewed and are found in Flowsheets. The following problems were reviewed today and where indicated follow up appointments were made and/or referrals ordered.    Positive Risk Factor Screenings with Interventions:              Inactivity:  On average, how many days per week do you engage in moderate to strenuous exercise (like a brisk walk)?: 2 days (!) Abnormal  On average, how many minutes do you engage in exercise at this level?: 30 min  Interventions:  Pt will try to walk more as weather is better     Abnormal BMI (obese):  Body mass index is 35.33 kg/m². (!) Abnormal  Interventions:  Pt has lost 4lbs in past 3-6mos and will try to walk more                        Objective   Vitals:    03/18/25 1015   BP: 138/88   Pulse: 72   SpO2: 95%   Weight: 84.8 kg (187 lb)   Height: 1.549 m (5' 1\")      Body mass index is 35.33 kg/m².      PE - see 6mos note          No Known Allergies  Prior to Visit Medications    Medication Sig Taking? Authorizing Provider   hydroCHLOROthiazide (HYDRODIURIL) 25 MG tablet TAKE 1 TABLET BY MOUTH EVERY DAY Yes Freda Leal MD   atorvastatin (LIPITOR) 80 MG tablet TAKE 1 TABLET BY MOUTH DAILY Yes Frantz Mccallum MD   metoprolol succinate (TOPROL XL) 50 MG extended release tablet TAKE 1 TABLET BY MOUTH TWICE DAILY Yes Freda Leal MD   aspirin 81 MG EC tablet Take 1 tablet by mouth daily Yes Provider, MD Melba   zinc gluconate 50

## 2025-03-18 NOTE — PATIENT INSTRUCTIONS
stop and talk to your doctor.  Where can you learn more?  Go to https://www.healthCo.Import.net/patientEd and enter P600 to learn more about \"Learning About Being Active as an Older Adult.\"  Current as of: July 31, 2024  Content Version: 14.4  © 1989-0257 [employer].   Care instructions adapted under license by [employer]. If you have questions about a medical condition or this instruction, always ask your healthcare professional. [employer] disclaims any warranty or liability for your use of this information.         Starting a Weight-Loss Plan: Care Instructions  Overview    It can be a challenge to lose weight. But your doctor can help you make a weight-loss plan that meets your needs.  You don't have to make a lot of big changes at once. A better idea might be to focus on small changes and stick with them. When those changes become habit, you can add a few more changes.  Some people find it helpful to take an exercise or nutrition class. If you have questions, ask your doctor about seeing a registered dietitian or an exercise specialist. You might also think about joining a weight-loss support group.  If you're not ready to make changes right now, try to pick a date in the future. Then make an appointment with your doctor to talk about when and how you'll get started with a plan.  Follow-up care is a key part of your treatment and safety. Be sure to make and go to all appointments, and call your doctor if you are having problems. It's also a good idea to know your test results and keep a list of the medicines you take.  How can you care for yourself as you start a weight-loss plan?  Set realistic goals. Many people expect to lose much more weight than is likely. A weight loss of 5% to 10% of your body weight may be enough to improve your health.  Get family and friends involved to provide support. Talk to them about why you are trying to lose weight, and ask them to help. They can help by

## 2025-05-06 RX ORDER — METOPROLOL SUCCINATE 50 MG/1
50 TABLET, EXTENDED RELEASE ORAL 2 TIMES DAILY
Qty: 60 TABLET | Refills: 5 | Status: SHIPPED | OUTPATIENT
Start: 2025-05-06

## 2025-08-04 DIAGNOSIS — I10 ESSENTIAL HYPERTENSION, BENIGN: ICD-10-CM

## 2025-08-04 RX ORDER — HYDROCHLOROTHIAZIDE 25 MG/1
25 TABLET ORAL DAILY
Qty: 90 TABLET | Refills: 1 | Status: SHIPPED | OUTPATIENT
Start: 2025-08-04